# Patient Record
Sex: FEMALE | Race: WHITE | Employment: OTHER | URBAN - METROPOLITAN AREA
[De-identification: names, ages, dates, MRNs, and addresses within clinical notes are randomized per-mention and may not be internally consistent; named-entity substitution may affect disease eponyms.]

---

## 2017-01-10 ENCOUNTER — ANTI-COAG VISIT (OUTPATIENT)
Dept: PHARMACY | Facility: CLINIC | Age: 82
End: 2017-01-10

## 2017-01-10 DIAGNOSIS — Z79.01 LONG TERM (CURRENT) USE OF ANTICOAGULANTS: ICD-10-CM

## 2017-01-10 LAB — INR BLD: 3.2

## 2017-02-07 ENCOUNTER — ANTI-COAG VISIT (OUTPATIENT)
Dept: PHARMACY | Facility: CLINIC | Age: 82
End: 2017-02-07

## 2017-02-07 DIAGNOSIS — Z79.01 LONG TERM (CURRENT) USE OF ANTICOAGULANTS: ICD-10-CM

## 2017-02-07 LAB — INR BLD: 3.8

## 2017-02-16 ENCOUNTER — ANTI-COAG VISIT (OUTPATIENT)
Dept: PHARMACY | Facility: CLINIC | Age: 82
End: 2017-02-16

## 2017-02-16 DIAGNOSIS — Z79.01 LONG TERM (CURRENT) USE OF ANTICOAGULANTS: ICD-10-CM

## 2017-02-16 LAB — INR BLD: 2.3

## 2017-02-22 ENCOUNTER — OFFICE VISIT (OUTPATIENT)
Dept: CARDIOLOGY CLINIC | Age: 82
End: 2017-02-22

## 2017-02-22 VITALS
WEIGHT: 170.4 LBS | HEIGHT: 61 IN | HEART RATE: 73 BPM | SYSTOLIC BLOOD PRESSURE: 144 MMHG | BODY MASS INDEX: 32.17 KG/M2 | DIASTOLIC BLOOD PRESSURE: 80 MMHG

## 2017-02-22 DIAGNOSIS — I48.0 PAROXYSMAL ATRIAL FIBRILLATION (HCC): Primary | ICD-10-CM

## 2017-02-22 DIAGNOSIS — I10 ESSENTIAL HYPERTENSION: ICD-10-CM

## 2017-02-22 PROCEDURE — 1123F ACP DISCUSS/DSCN MKR DOCD: CPT | Performed by: NURSE PRACTITIONER

## 2017-02-22 PROCEDURE — G8427 DOCREV CUR MEDS BY ELIG CLIN: HCPCS | Performed by: NURSE PRACTITIONER

## 2017-02-22 PROCEDURE — G8482 FLU IMMUNIZE ORDER/ADMIN: HCPCS | Performed by: NURSE PRACTITIONER

## 2017-02-22 PROCEDURE — 1090F PRES/ABSN URINE INCON ASSESS: CPT | Performed by: NURSE PRACTITIONER

## 2017-02-22 PROCEDURE — 99213 OFFICE O/P EST LOW 20 MIN: CPT | Performed by: NURSE PRACTITIONER

## 2017-02-22 PROCEDURE — G8417 CALC BMI ABV UP PARAM F/U: HCPCS | Performed by: NURSE PRACTITIONER

## 2017-02-22 PROCEDURE — 1036F TOBACCO NON-USER: CPT | Performed by: NURSE PRACTITIONER

## 2017-02-22 PROCEDURE — 93000 ELECTROCARDIOGRAM COMPLETE: CPT | Performed by: NURSE PRACTITIONER

## 2017-02-22 PROCEDURE — 4040F PNEUMOC VAC/ADMIN/RCVD: CPT | Performed by: NURSE PRACTITIONER

## 2017-02-22 RX ORDER — DILTIAZEM HYDROCHLORIDE 120 MG/1
120 CAPSULE, COATED, EXTENDED RELEASE ORAL DAILY
Qty: 30 CAPSULE | Refills: 5 | Status: SHIPPED | OUTPATIENT
Start: 2017-02-22 | End: 2017-08-23 | Stop reason: SDUPTHER

## 2017-02-22 RX ORDER — WARFARIN SODIUM 4 MG/1
TABLET ORAL
Qty: 30 TABLET | Refills: 3 | Status: SHIPPED | OUTPATIENT
Start: 2017-02-22 | End: 2017-10-05 | Stop reason: SDUPTHER

## 2017-03-07 ENCOUNTER — ANTI-COAG VISIT (OUTPATIENT)
Dept: PHARMACY | Facility: CLINIC | Age: 82
End: 2017-03-07

## 2017-03-07 DIAGNOSIS — Z79.01 LONG TERM (CURRENT) USE OF ANTICOAGULANTS: ICD-10-CM

## 2017-03-07 LAB — INR BLD: 2.8

## 2017-03-29 DIAGNOSIS — E78.5 HYPERLIPIDEMIA, UNSPECIFIED HYPERLIPIDEMIA TYPE: ICD-10-CM

## 2017-03-29 DIAGNOSIS — E78.5 HYPERLIPIDEMIA, UNSPECIFIED HYPERLIPIDEMIA TYPE: Primary | ICD-10-CM

## 2017-03-29 LAB
A/G RATIO: 1.6 (ref 1.1–2.2)
ALBUMIN SERPL-MCNC: 4.1 G/DL (ref 3.4–5)
ALP BLD-CCNC: 104 U/L (ref 40–129)
ALT SERPL-CCNC: 12 U/L (ref 10–40)
ANION GAP SERPL CALCULATED.3IONS-SCNC: 18 MMOL/L (ref 3–16)
AST SERPL-CCNC: 18 U/L (ref 15–37)
BILIRUB SERPL-MCNC: 0.4 MG/DL (ref 0–1)
BUN BLDV-MCNC: 6 MG/DL (ref 7–20)
CALCIUM SERPL-MCNC: 9.1 MG/DL (ref 8.3–10.6)
CHLORIDE BLD-SCNC: 106 MMOL/L (ref 99–110)
CHOLESTEROL, TOTAL: 157 MG/DL (ref 0–199)
CO2: 21 MMOL/L (ref 21–32)
CREAT SERPL-MCNC: 0.9 MG/DL (ref 0.6–1.2)
GFR AFRICAN AMERICAN: >60
GFR NON-AFRICAN AMERICAN: 59
GLOBULIN: 2.6 G/DL
GLUCOSE BLD-MCNC: 144 MG/DL (ref 70–99)
HDLC SERPL-MCNC: 55 MG/DL (ref 40–60)
LDL CHOLESTEROL CALCULATED: 75 MG/DL
POTASSIUM SERPL-SCNC: 4.2 MMOL/L (ref 3.5–5.1)
SODIUM BLD-SCNC: 145 MMOL/L (ref 136–145)
TOTAL PROTEIN: 6.7 G/DL (ref 6.4–8.2)
TRIGL SERPL-MCNC: 136 MG/DL (ref 0–150)
VLDLC SERPL CALC-MCNC: 27 MG/DL

## 2017-04-04 ENCOUNTER — ANTI-COAG VISIT (OUTPATIENT)
Dept: PHARMACY | Facility: CLINIC | Age: 82
End: 2017-04-04

## 2017-04-04 DIAGNOSIS — Z79.01 LONG TERM (CURRENT) USE OF ANTICOAGULANTS: ICD-10-CM

## 2017-04-04 LAB — INR BLD: 2.2

## 2017-04-05 ENCOUNTER — OFFICE VISIT (OUTPATIENT)
Dept: FAMILY MEDICINE CLINIC | Age: 82
End: 2017-04-05

## 2017-04-05 VITALS
TEMPERATURE: 97.5 F | RESPIRATION RATE: 16 BRPM | DIASTOLIC BLOOD PRESSURE: 100 MMHG | WEIGHT: 164 LBS | SYSTOLIC BLOOD PRESSURE: 162 MMHG | BODY MASS INDEX: 30.99 KG/M2 | HEART RATE: 89 BPM

## 2017-04-05 DIAGNOSIS — I10 ESSENTIAL HYPERTENSION: Primary | ICD-10-CM

## 2017-04-05 DIAGNOSIS — D50.0 IRON DEFICIENCY ANEMIA DUE TO CHRONIC BLOOD LOSS: ICD-10-CM

## 2017-04-05 DIAGNOSIS — R73.9 ELEVATED BLOOD SUGAR: ICD-10-CM

## 2017-04-05 DIAGNOSIS — E78.2 MIXED HYPERLIPIDEMIA: ICD-10-CM

## 2017-04-05 LAB
BASOPHILS ABSOLUTE: 0 K/UL (ref 0–0.2)
BASOPHILS RELATIVE PERCENT: 1.1 %
EOSINOPHILS ABSOLUTE: 0.1 K/UL (ref 0–0.6)
EOSINOPHILS RELATIVE PERCENT: 1.5 %
HCT VFR BLD CALC: 44.3 % (ref 36–48)
HEMOGLOBIN: 13.9 G/DL (ref 12–16)
IRON SATURATION: 21 % (ref 15–50)
IRON: 65 UG/DL (ref 37–145)
LYMPHOCYTES ABSOLUTE: 1.5 K/UL (ref 1–5.1)
LYMPHOCYTES RELATIVE PERCENT: 36.1 %
MCH RBC QN AUTO: 27.2 PG (ref 26–34)
MCHC RBC AUTO-ENTMCNC: 31.4 G/DL (ref 31–36)
MCV RBC AUTO: 86.5 FL (ref 80–100)
MONOCYTES ABSOLUTE: 0.5 K/UL (ref 0–1.3)
MONOCYTES RELATIVE PERCENT: 11.4 %
NEUTROPHILS ABSOLUTE: 2.1 K/UL (ref 1.7–7.7)
NEUTROPHILS RELATIVE PERCENT: 49.9 %
PDW BLD-RTO: 30.4 % (ref 12.4–15.4)
PLATELET # BLD: 180 K/UL (ref 135–450)
PMV BLD AUTO: 9.3 FL (ref 5–10.5)
RBC # BLD: 5.12 M/UL (ref 4–5.2)
TOTAL IRON BINDING CAPACITY: 310 UG/DL (ref 260–445)
WBC # BLD: 4.1 K/UL (ref 4–11)

## 2017-04-05 PROCEDURE — 99214 OFFICE O/P EST MOD 30 MIN: CPT | Performed by: FAMILY MEDICINE

## 2017-04-05 PROCEDURE — 1036F TOBACCO NON-USER: CPT | Performed by: FAMILY MEDICINE

## 2017-04-05 PROCEDURE — 4040F PNEUMOC VAC/ADMIN/RCVD: CPT | Performed by: FAMILY MEDICINE

## 2017-04-05 PROCEDURE — 1123F ACP DISCUSS/DSCN MKR DOCD: CPT | Performed by: FAMILY MEDICINE

## 2017-04-05 PROCEDURE — G8417 CALC BMI ABV UP PARAM F/U: HCPCS | Performed by: FAMILY MEDICINE

## 2017-04-05 PROCEDURE — G8427 DOCREV CUR MEDS BY ELIG CLIN: HCPCS | Performed by: FAMILY MEDICINE

## 2017-04-05 PROCEDURE — 1090F PRES/ABSN URINE INCON ASSESS: CPT | Performed by: FAMILY MEDICINE

## 2017-04-05 RX ORDER — LISINOPRIL 10 MG/1
10 TABLET ORAL DAILY
Qty: 30 TABLET | Refills: 3 | Status: SHIPPED | OUTPATIENT
Start: 2017-04-05 | End: 2017-10-26 | Stop reason: ALTCHOICE

## 2017-04-05 RX ORDER — ATORVASTATIN CALCIUM 20 MG/1
20 TABLET, FILM COATED ORAL DAILY
Qty: 30 TABLET | Refills: 5 | Status: SHIPPED | OUTPATIENT
Start: 2017-04-05 | End: 2017-10-26 | Stop reason: SDUPTHER

## 2017-04-05 ASSESSMENT — ENCOUNTER SYMPTOMS
WHEEZING: 0
COUGH: 0
CONSTIPATION: 0
DIARRHEA: 0
SHORTNESS OF BREATH: 0
BLOOD IN STOOL: 0

## 2017-04-06 ENCOUNTER — TELEPHONE (OUTPATIENT)
Dept: FAMILY MEDICINE CLINIC | Age: 82
End: 2017-04-06

## 2017-04-06 LAB
ESTIMATED AVERAGE GLUCOSE: 125.5 MG/DL
HBA1C MFR BLD: 6 %

## 2017-04-08 ASSESSMENT — ENCOUNTER SYMPTOMS
PHOTOPHOBIA: 0
SORE THROAT: 0
TROUBLE SWALLOWING: 0
ABDOMINAL PAIN: 0

## 2017-05-02 ENCOUNTER — ANTI-COAG VISIT (OUTPATIENT)
Dept: PHARMACY | Facility: CLINIC | Age: 82
End: 2017-05-02

## 2017-05-02 DIAGNOSIS — Z79.01 LONG TERM (CURRENT) USE OF ANTICOAGULANTS: ICD-10-CM

## 2017-05-02 LAB — INR BLD: 1.9

## 2017-06-06 ENCOUNTER — ANTI-COAG VISIT (OUTPATIENT)
Dept: PHARMACY | Facility: CLINIC | Age: 82
End: 2017-06-06

## 2017-06-06 DIAGNOSIS — Z79.01 LONG TERM (CURRENT) USE OF ANTICOAGULANTS: ICD-10-CM

## 2017-06-06 LAB — INR BLD: 2.4

## 2017-07-10 ENCOUNTER — ANTI-COAG VISIT (OUTPATIENT)
Dept: PHARMACY | Facility: CLINIC | Age: 82
End: 2017-07-10

## 2017-07-10 DIAGNOSIS — Z79.01 LONG TERM (CURRENT) USE OF ANTICOAGULANTS: ICD-10-CM

## 2017-07-10 LAB — INR BLD: 2.5

## 2017-08-08 ENCOUNTER — ANTI-COAG VISIT (OUTPATIENT)
Dept: PHARMACY | Facility: CLINIC | Age: 82
End: 2017-08-08

## 2017-08-08 DIAGNOSIS — Z79.01 LONG TERM (CURRENT) USE OF ANTICOAGULANTS: ICD-10-CM

## 2017-08-08 LAB — INR BLD: 2.8

## 2017-08-11 RX ORDER — OMEPRAZOLE 40 MG/1
CAPSULE, DELAYED RELEASE ORAL
Qty: 30 CAPSULE | Refills: 4 | Status: SHIPPED | OUTPATIENT
Start: 2017-08-11 | End: 2017-10-26 | Stop reason: SDUPTHER

## 2017-08-23 ENCOUNTER — OFFICE VISIT (OUTPATIENT)
Dept: CARDIOLOGY CLINIC | Age: 82
End: 2017-08-23

## 2017-08-23 VITALS
BODY MASS INDEX: 32.1 KG/M2 | WEIGHT: 170 LBS | HEART RATE: 79 BPM | SYSTOLIC BLOOD PRESSURE: 140 MMHG | HEIGHT: 61 IN | DIASTOLIC BLOOD PRESSURE: 86 MMHG

## 2017-08-23 DIAGNOSIS — I48.0 PAROXYSMAL ATRIAL FIBRILLATION (HCC): Primary | ICD-10-CM

## 2017-08-23 PROCEDURE — 1123F ACP DISCUSS/DSCN MKR DOCD: CPT | Performed by: INTERNAL MEDICINE

## 2017-08-23 PROCEDURE — 93000 ELECTROCARDIOGRAM COMPLETE: CPT | Performed by: INTERNAL MEDICINE

## 2017-08-23 PROCEDURE — 4040F PNEUMOC VAC/ADMIN/RCVD: CPT | Performed by: INTERNAL MEDICINE

## 2017-08-23 PROCEDURE — G8427 DOCREV CUR MEDS BY ELIG CLIN: HCPCS | Performed by: INTERNAL MEDICINE

## 2017-08-23 PROCEDURE — 1036F TOBACCO NON-USER: CPT | Performed by: INTERNAL MEDICINE

## 2017-08-23 PROCEDURE — 99214 OFFICE O/P EST MOD 30 MIN: CPT | Performed by: INTERNAL MEDICINE

## 2017-08-23 PROCEDURE — 1090F PRES/ABSN URINE INCON ASSESS: CPT | Performed by: INTERNAL MEDICINE

## 2017-08-23 PROCEDURE — G8417 CALC BMI ABV UP PARAM F/U: HCPCS | Performed by: INTERNAL MEDICINE

## 2017-08-23 RX ORDER — DILTIAZEM HYDROCHLORIDE 240 MG/1
240 CAPSULE, COATED, EXTENDED RELEASE ORAL DAILY
Qty: 30 CAPSULE | Refills: 11 | Status: SHIPPED | OUTPATIENT
Start: 2017-08-23 | End: 2018-05-09 | Stop reason: SDUPTHER

## 2017-09-05 ENCOUNTER — ANTI-COAG VISIT (OUTPATIENT)
Dept: PHARMACY | Facility: CLINIC | Age: 82
End: 2017-09-05

## 2017-09-05 DIAGNOSIS — Z79.01 LONG TERM (CURRENT) USE OF ANTICOAGULANTS: ICD-10-CM

## 2017-09-05 LAB — INR BLD: 2.5

## 2017-10-03 ENCOUNTER — ANTI-COAG VISIT (OUTPATIENT)
Dept: PHARMACY | Facility: CLINIC | Age: 82
End: 2017-10-03

## 2017-10-03 DIAGNOSIS — Z79.01 LONG TERM (CURRENT) USE OF ANTICOAGULANTS: ICD-10-CM

## 2017-10-03 LAB — INR BLD: 2.3

## 2017-10-11 DIAGNOSIS — I10 ESSENTIAL HYPERTENSION: Primary | ICD-10-CM

## 2017-10-11 DIAGNOSIS — E78.2 MIXED HYPERLIPIDEMIA: ICD-10-CM

## 2017-10-20 DIAGNOSIS — E78.2 MIXED HYPERLIPIDEMIA: ICD-10-CM

## 2017-10-20 DIAGNOSIS — I10 ESSENTIAL HYPERTENSION: ICD-10-CM

## 2017-10-20 LAB
A/G RATIO: 1.4 (ref 1.1–2.2)
ALBUMIN SERPL-MCNC: 4.2 G/DL (ref 3.4–5)
ALP BLD-CCNC: 122 U/L (ref 40–129)
ALT SERPL-CCNC: 16 U/L (ref 10–40)
ANION GAP SERPL CALCULATED.3IONS-SCNC: 17 MMOL/L (ref 3–16)
AST SERPL-CCNC: 17 U/L (ref 15–37)
BILIRUB SERPL-MCNC: 0.5 MG/DL (ref 0–1)
BUN BLDV-MCNC: 14 MG/DL (ref 7–20)
CALCIUM SERPL-MCNC: 9.6 MG/DL (ref 8.3–10.6)
CHLORIDE BLD-SCNC: 105 MMOL/L (ref 99–110)
CHOLESTEROL, TOTAL: 190 MG/DL (ref 0–199)
CO2: 23 MMOL/L (ref 21–32)
CREAT SERPL-MCNC: 0.9 MG/DL (ref 0.6–1.2)
GFR AFRICAN AMERICAN: >60
GFR NON-AFRICAN AMERICAN: 59
GLOBULIN: 3.1 G/DL
GLUCOSE BLD-MCNC: 116 MG/DL (ref 70–99)
HDLC SERPL-MCNC: 56 MG/DL (ref 40–60)
LDL CHOLESTEROL CALCULATED: 113 MG/DL
POTASSIUM SERPL-SCNC: 4.3 MMOL/L (ref 3.5–5.1)
SODIUM BLD-SCNC: 145 MMOL/L (ref 136–145)
TOTAL PROTEIN: 7.3 G/DL (ref 6.4–8.2)
TRIGL SERPL-MCNC: 105 MG/DL (ref 0–150)
VLDLC SERPL CALC-MCNC: 21 MG/DL

## 2017-10-26 ENCOUNTER — OFFICE VISIT (OUTPATIENT)
Dept: FAMILY MEDICINE CLINIC | Age: 82
End: 2017-10-26

## 2017-10-26 VITALS
TEMPERATURE: 97.6 F | HEART RATE: 120 BPM | DIASTOLIC BLOOD PRESSURE: 89 MMHG | SYSTOLIC BLOOD PRESSURE: 125 MMHG | RESPIRATION RATE: 16 BRPM | WEIGHT: 156 LBS | BODY MASS INDEX: 29.48 KG/M2

## 2017-10-26 DIAGNOSIS — I48.0 PAROXYSMAL ATRIAL FIBRILLATION (HCC): ICD-10-CM

## 2017-10-26 DIAGNOSIS — E78.2 MIXED HYPERLIPIDEMIA: Primary | ICD-10-CM

## 2017-10-26 DIAGNOSIS — C18.2 MALIGNANT NEOPLASM OF ASCENDING COLON (HCC): ICD-10-CM

## 2017-10-26 DIAGNOSIS — R73.01 IMPAIRED FASTING BLOOD SUGAR: ICD-10-CM

## 2017-10-26 LAB
CEA: 3.1 NG/ML (ref 0–5)
HBA1C MFR BLD: 6 %

## 2017-10-26 PROCEDURE — 4040F PNEUMOC VAC/ADMIN/RCVD: CPT | Performed by: FAMILY MEDICINE

## 2017-10-26 PROCEDURE — 83036 HEMOGLOBIN GLYCOSYLATED A1C: CPT | Performed by: FAMILY MEDICINE

## 2017-10-26 PROCEDURE — 1123F ACP DISCUSS/DSCN MKR DOCD: CPT | Performed by: FAMILY MEDICINE

## 2017-10-26 PROCEDURE — 1036F TOBACCO NON-USER: CPT | Performed by: FAMILY MEDICINE

## 2017-10-26 PROCEDURE — G8427 DOCREV CUR MEDS BY ELIG CLIN: HCPCS | Performed by: FAMILY MEDICINE

## 2017-10-26 PROCEDURE — G8417 CALC BMI ABV UP PARAM F/U: HCPCS | Performed by: FAMILY MEDICINE

## 2017-10-26 PROCEDURE — 99214 OFFICE O/P EST MOD 30 MIN: CPT | Performed by: FAMILY MEDICINE

## 2017-10-26 PROCEDURE — 1090F PRES/ABSN URINE INCON ASSESS: CPT | Performed by: FAMILY MEDICINE

## 2017-10-26 PROCEDURE — G8484 FLU IMMUNIZE NO ADMIN: HCPCS | Performed by: FAMILY MEDICINE

## 2017-10-26 RX ORDER — ATORVASTATIN CALCIUM 20 MG/1
20 TABLET, FILM COATED ORAL DAILY
Qty: 30 TABLET | Refills: 5 | Status: SHIPPED | OUTPATIENT
Start: 2017-10-26 | End: 2018-05-03 | Stop reason: SDUPTHER

## 2017-10-26 RX ORDER — OMEPRAZOLE 40 MG/1
CAPSULE, DELAYED RELEASE ORAL
Qty: 30 CAPSULE | Refills: 5 | Status: SHIPPED | OUTPATIENT
Start: 2017-10-26 | End: 2018-05-09 | Stop reason: SDUPTHER

## 2017-10-26 ASSESSMENT — ENCOUNTER SYMPTOMS
BLOOD IN STOOL: 0
SHORTNESS OF BREATH: 0
WHEEZING: 0
DIARRHEA: 0
CONSTIPATION: 0
COUGH: 0

## 2017-10-26 NOTE — PROGRESS NOTES
Pneumovax 23 and a booster-  OK on pneumonia vaccine      Discussed meds and general therapy and condition. Stable and doing well. Follow 6 months    Prior to Visit Medications    Medication Sig Taking?  Authorizing Provider   omeprazole (PRILOSEC) 40 MG delayed release capsule TAKE ONE CAPSULE BY MOUTH DAILY Yes Cheryal Burkitt, DO   atorvastatin (LIPITOR) 20 MG tablet Take 1 tablet by mouth daily Yes Cheryal Burkitt, DO   warfarin (COUMADIN) 4 MG tablet TAKE ONE TABLET BY MOUTH DAILY Yes Alyssa Oliveira CNP   diltiazem (CARDIZEM CD) 240 MG extended release capsule Take 1 capsule by mouth daily Yes Alex Heck MD

## 2017-10-30 ENCOUNTER — TELEPHONE (OUTPATIENT)
Dept: FAMILY MEDICINE CLINIC | Age: 82
End: 2017-10-30

## 2017-10-30 NOTE — TELEPHONE ENCOUNTER
Patient called to get results from 10.26.17.    [phone] can leave voicemail only if results are normal.     Future Appointments  Date Time Provider Tracey Lambert   11/7/2017 11:30 AM [address] [facility]

## 2017-11-01 ENCOUNTER — HOSPITAL ENCOUNTER (OUTPATIENT)
Dept: OTHER | Age: 82
Discharge: HOME | End: 2017-11-30
Attending: INTERNAL MEDICINE | Admitting: INTERNAL MEDICINE

## 2017-11-07 ENCOUNTER — ANTI-COAG VISIT (OUTPATIENT)
Dept: PHARMACY | Facility: CLINIC | Age: 82
End: 2017-11-07

## 2017-11-07 DIAGNOSIS — Z79.01 LONG TERM CURRENT USE OF ANTICOAGULANT THERAPY: ICD-10-CM

## 2017-11-07 LAB — INR BLD: 1.5

## 2017-11-21 ENCOUNTER — ANTI-COAG VISIT (OUTPATIENT)
Dept: PHARMACY | Facility: CLINIC | Age: 82
End: 2017-11-21

## 2017-11-21 DIAGNOSIS — Z79.01 LONG TERM CURRENT USE OF ANTICOAGULANT THERAPY: ICD-10-CM

## 2017-11-21 LAB — INR BLD: 1.5

## 2017-12-01 ENCOUNTER — ANTI-COAG VISIT (OUTPATIENT)
Dept: PHARMACY | Facility: CLINIC | Age: 82
End: 2017-12-01

## 2017-12-01 ENCOUNTER — HOSPITAL ENCOUNTER (OUTPATIENT)
Dept: OTHER | Age: 82
Discharge: HOME | End: 2017-12-31
Attending: INTERNAL MEDICINE | Admitting: INTERNAL MEDICINE

## 2017-12-01 DIAGNOSIS — Z79.01 LONG TERM CURRENT USE OF ANTICOAGULANT THERAPY: ICD-10-CM

## 2017-12-01 LAB — INR BLD: 1.5

## 2017-12-07 ENCOUNTER — HOSPITAL ENCOUNTER (OUTPATIENT)
Dept: MAMMOGRAPHY | Age: 82
Discharge: HOME | End: 2017-12-07
Admitting: FAMILY MEDICINE

## 2017-12-07 DIAGNOSIS — Z12.39 BREAST CANCER SCREENING: ICD-10-CM

## 2017-12-08 ENCOUNTER — ANTI-COAG VISIT (OUTPATIENT)
Dept: PHARMACY | Facility: CLINIC | Age: 82
End: 2017-12-08

## 2017-12-08 DIAGNOSIS — Z79.01 LONG TERM CURRENT USE OF ANTICOAGULANT THERAPY: ICD-10-CM

## 2017-12-08 LAB — INR BLD: 1.7

## 2017-12-15 ENCOUNTER — ANTI-COAG VISIT (OUTPATIENT)
Dept: PHARMACY | Facility: CLINIC | Age: 82
End: 2017-12-15

## 2017-12-15 DIAGNOSIS — Z79.01 LONG TERM CURRENT USE OF ANTICOAGULANT THERAPY: ICD-10-CM

## 2017-12-15 LAB — INR BLD: 2.3

## 2017-12-15 NOTE — PROGRESS NOTES
Ms. Kobi Meade is here for management of anticoagulation for Atrial Fibrillation. PMH also significant for HTN and HLD. She presents today w/out complaint. Pt verifies dosing regimen as listed above. Pt denies s/s bleeding/bruising/swelling/SOB. No BRBPR. No melena. Patient reports not taking any OTC/Herbals/RX. Reviewed dietary concerns. No EToH and tobacco use. Patient states she has been eating healthier and has lost weight, is eating greens but states it is not more than usual.    INR 2.3 is within therapeutic range of 2-3. Recommend to continue 6mg Q Fri and 4 mg daily all other days. Patient has 4 mg tablets. Will continue to monitor and check INR in 2 weeks as pt's dose has been steadily increased over the past few weeks. Dosing reminder card given with phone number, appointment date and time.    Return to clinic: 12/29 11:45am

## 2017-12-29 ENCOUNTER — ANTI-COAG VISIT (OUTPATIENT)
Dept: PHARMACY | Facility: CLINIC | Age: 82
End: 2017-12-29

## 2017-12-29 LAB — INR BLD: 2.2

## 2017-12-29 NOTE — PROGRESS NOTES
Ms. Claudia Menon is here for management of anticoagulation for Atrial Fibrillation. PMH also significant for HTN and HLD. She presents today w/out complaint. Pt verifies dosing regimen as listed above. Pt denies s/s bleeding/bruising/swelling/SOB. No BRBPR. No melena. Patient reports not taking any OTC/Herbals/RX. Reviewed dietary concerns. No EToH and tobacco use. Patient states she has been eating healthier and has lost weight, is eating greens but states it is not more than usual.    INR 2.2 is within therapeutic range of 2-3. Recommend to continue 6mg Q Fri and 4 mg daily all other days. Patient has 4 mg tablets. Will continue to monitor and check INR in 4 weeks  Dosing reminder card given with phone number, appointment date and time. Return to clinic: 1/23 11:30am    Raquel Kuhn PharmD. Candidate 2018    I have seen the patient and reviewed the progress note written by the PharmD Candidate. I agree with this assessment and plan.    Pushpa MedinaD 12/29/2017 11:45 AM

## 2018-01-01 ENCOUNTER — HOSPITAL ENCOUNTER (OUTPATIENT)
Dept: OTHER | Age: 83
Discharge: HOME | End: 2018-01-31
Attending: INTERNAL MEDICINE | Admitting: INTERNAL MEDICINE

## 2018-01-23 ENCOUNTER — ANTI-COAG VISIT (OUTPATIENT)
Dept: PHARMACY | Facility: CLINIC | Age: 83
End: 2018-01-23

## 2018-01-23 DIAGNOSIS — Z79.01 LONG TERM CURRENT USE OF ANTICOAGULANT THERAPY: ICD-10-CM

## 2018-01-23 LAB — INR BLD: 1.9

## 2018-02-01 ENCOUNTER — HOSPITAL ENCOUNTER (OUTPATIENT)
Dept: OTHER | Age: 83
Discharge: HOME | End: 2018-02-28
Attending: INTERNAL MEDICINE | Admitting: INTERNAL MEDICINE

## 2018-02-20 ENCOUNTER — ANTI-COAG VISIT (OUTPATIENT)
Dept: PHARMACY | Facility: CLINIC | Age: 83
End: 2018-02-20

## 2018-02-20 DIAGNOSIS — Z79.01 LONG TERM CURRENT USE OF ANTICOAGULANT THERAPY: ICD-10-CM

## 2018-02-20 LAB — INR BLD: 1.8

## 2018-02-28 ENCOUNTER — OFFICE VISIT (OUTPATIENT)
Dept: CARDIOLOGY CLINIC | Age: 83
End: 2018-02-28

## 2018-02-28 VITALS
DIASTOLIC BLOOD PRESSURE: 86 MMHG | HEIGHT: 61 IN | SYSTOLIC BLOOD PRESSURE: 138 MMHG | BODY MASS INDEX: 28.32 KG/M2 | HEART RATE: 102 BPM | WEIGHT: 150 LBS

## 2018-02-28 DIAGNOSIS — I10 ESSENTIAL HYPERTENSION: ICD-10-CM

## 2018-02-28 DIAGNOSIS — I48.21 PERMANENT ATRIAL FIBRILLATION (HCC): Primary | ICD-10-CM

## 2018-02-28 PROCEDURE — 1036F TOBACCO NON-USER: CPT | Performed by: NURSE PRACTITIONER

## 2018-02-28 PROCEDURE — 99213 OFFICE O/P EST LOW 20 MIN: CPT | Performed by: NURSE PRACTITIONER

## 2018-02-28 PROCEDURE — 1123F ACP DISCUSS/DSCN MKR DOCD: CPT | Performed by: NURSE PRACTITIONER

## 2018-02-28 PROCEDURE — G8427 DOCREV CUR MEDS BY ELIG CLIN: HCPCS | Performed by: NURSE PRACTITIONER

## 2018-02-28 PROCEDURE — 93000 ELECTROCARDIOGRAM COMPLETE: CPT | Performed by: NURSE PRACTITIONER

## 2018-02-28 PROCEDURE — 4040F PNEUMOC VAC/ADMIN/RCVD: CPT | Performed by: NURSE PRACTITIONER

## 2018-02-28 PROCEDURE — 1090F PRES/ABSN URINE INCON ASSESS: CPT | Performed by: NURSE PRACTITIONER

## 2018-02-28 PROCEDURE — G8417 CALC BMI ABV UP PARAM F/U: HCPCS | Performed by: NURSE PRACTITIONER

## 2018-02-28 PROCEDURE — G8484 FLU IMMUNIZE NO ADMIN: HCPCS | Performed by: NURSE PRACTITIONER

## 2018-02-28 NOTE — LETTER
[address] Cardiology - [address] [facility] [address] [place]. [address]  Phone: [phone]  Fax: [phone]    Carmen Velazquez, [address] [facility]        March 4, 2018     Henry Epstein DO  [address] [facility] [address]  [address] [facility]    Patient: Adela Andres  MR Number: [id number]  YOB: 1931  Date of Visit: 2/28/2018    Dear Dr. Henry Epstein:    I recently saw our mutual patient, listed above. Below are the relevant portions of my assessment and plan of care. [address]   Electrophysiology  Note              Date:  February 28, 2018  Patient name: Adela Andres  YOB: 1931    Primary Care physician: Henry Epstein DO    HISTORY OF PRESENT ILLNESS: The patient is an 80 y.o.  female with a past medical history of atrial fibrillation, HTN, HLD, and colon cancer. She was admitted to the hospital in 6/2016 for an elective right colectomy. Her rhythm converted to atrial fibrillation with RVR during her hospitalization. Her rate was controlled and she was discharged home on Cardizem, metoprolol, and Coumadin. At her visits in 7/2016 and 8/2016 her rhythm was sinus martínez and her metoprolol and diltiazem were decreased. She was back in afib in 2/2017 (asymptomatic) and has remained in AF at subsequent visits. Metoprolol was stopped in 8/2017 due to fatigue. Today, she is being seen for atrial fibrillation. Her EKG shows atrial fibrillation with a HR of 102. She is feeling well and denies chest pain, palpitations, shortness of breath, and dizziness. States her fatigue improved off metoprolol. Reports home HR as averaging 80. Past Medical History:   has a past medical history of Anemia; Atrial fibrillation ([address] Cancer ([address] Hyperlipidemia; and Hypertension. Past Surgical History:   has a past surgical history that includes Appendectomy; Hysterectomy; Colonoscopy (5/18/16);  Upper gastrointestinal endoscopy (5/18/16); fracture surgery; and Colon surgery (Right, 6/7/16). Home Medications:    Prior to Admission medications    Medication Sig Start Date End Date Taking? Authorizing Provider   omeprazole (PRILOSEC) 40 MG delayed release capsule TAKE ONE CAPSULE BY MOUTH DAILY 10/26/17  Yes Maia Hidalgo DO   atorvastatin (LIPITOR) 20 MG tablet Take 1 tablet by mouth daily 10/26/17  Yes Maia Hidalgo DO   warfarin (COUMADIN) 4 MG tablet TAKE ONE TABLET BY MOUTH DAILY 10/5/17  Yes Nicki Hull CNP   diltiazem (CARDIZEM CD) 240 MG extended release capsule Take 1 capsule by mouth daily 8/23/17  Yes Roya Darling MD       Allergies:  Amoxicillin and Sulfa antibiotics    Social History:   reports that she has never smoked. She has never used smokeless tobacco. She reports that she does not drink alcohol or use drugs. Family History: family history includes Cancer in her mother. Review of Systems   Constitutional: Negative  HENT: Negative. Eyes: Negative. Respiratory: Negative. Cardiovascular: see HPI  Gastrointestinal: Negative. Genitourinary: Negative. Musculoskeletal: Negative. Skin: Negative. Neurological: Negative. Hematological: Negative. Psychiatric/Behavioral: Negative. PHYSICAL EXAM:    Physical Examination:    /86   Pulse 102   Ht 5' 1\" (1.549 m)   Wt 150 lb (68 kg)   BMI 28.34 kg/m²       Constitutional and general appearance: alert, cooperative, no distress and appears stated age  [de-identified]: PERRL, no cervical lymphadenopathy. No masses palpable.  Normal oral mucosa  Respiratory:  · Normal excursion and expansion without use of accessory muscles  · Resp auscultation: Normal breath sounds without dullness or wheezing  Cardiovascular:  · The apical impulse is not displaced  · Heart tones are crisp and normal. Irregular S1 and S2.  · Jugular venous pulsation Normal  · The carotid upstroke is normal in amplitude and contour without delay or bruit

## 2018-02-28 NOTE — PROGRESS NOTES
[facility]   Electrophysiology  Note              Date:  February 28, 2018  Patient name: Zachary La  YOB: 1931    Primary Care physician: Maribell Farmer DO    HISTORY OF PRESENT ILLNESS: The patient is an 80 y.o.  female with a past medical history of atrial fibrillation, HTN, HLD, and colon cancer. She was admitted to the hospital in 6/2016 for an elective right colectomy. Her rhythm converted to atrial fibrillation with RVR during her hospitalization. Her rate was controlled and she was discharged home on Cardizem, metoprolol, and Coumadin. At her visits in 7/2016 and 8/2016 her rhythm was sinus martínez and her metoprolol and diltiazem were decreased. She was back in afib in 2/2017 (asymptomatic) and has remained in AF at subsequent visits. Metoprolol was stopped in 8/2017 due to fatigue. Today, she is being seen for atrial fibrillation. Her EKG shows atrial fibrillation with a HR of 102. She is feeling well and denies chest pain, palpitations, shortness of breath, and dizziness. States her fatigue improved off metoprolol. Reports home HR as averaging 80. Past Medical History:   has a past medical history of Anemia; Atrial fibrillation ([address] [address] Hyperlipidemia; and Hypertension. Past Surgical History:   has a past surgical history that includes Appendectomy; Hysterectomy; Colonoscopy (5/18/16); Upper gastrointestinal endoscopy (5/18/16); fracture surgery; and Colon surgery (Right, 6/7/16). Home Medications:    Prior to Admission medications    Medication Sig Start Date End Date Taking?  Authorizing Provider   omeprazole (PRILOSEC) 40 MG delayed release capsule TAKE ONE CAPSULE BY MOUTH DAILY 10/26/17  Yes Maribell Farmer DO   atorvastatin (LIPITOR) 20 MG tablet Take 1 tablet by mouth daily 10/26/17  Yes Maribell Farmer DO   warfarin (COUMADIN) 4 MG tablet TAKE ONE TABLET BY MOUTH DAILY 10/5/17  Yes Delmis Otoole CNP   diltiazem (CARDIZEM CD) 240 LABGLOM, GLUCOSE in the last 72 hours. PT/INR:   No results for input(s): PROTIME, INR in the last 72 hours. APTT:No results for input(s): APTT in the last 72 hours. FASTING LIPID PANEL:  Lab Results   Component Value Date    HDL 56 10/20/2017    LDLCALC 113 10/20/2017    TRIG 105 10/20/2017     LIVER PROFILE:No results for input(s): AST, ALT, ALB in the last 72 hours. Assessment:   1. Permanent atrial fibrillation:  today    -on Coumadin for [id number] score 4 (age, gender, HTN)  2. HTN: controlled  3. Colon cancer: s/p right colectomy on 6/7/2016  4. Anemia: managed by Dr. Sarah Cochran, intolerant of iron supplements    Plan:   1. No changes today  2. Continue to monitor heart rate at home and call if consistently over 100  3.  Follow up in 6 months    Marquis Lorenzana, [address]  [phone]

## 2018-03-01 ENCOUNTER — HOSPITAL ENCOUNTER (OUTPATIENT)
Dept: OTHER | Age: 83
Discharge: HOME | End: 2018-03-31
Attending: INTERNAL MEDICINE | Admitting: INTERNAL MEDICINE

## 2018-03-05 NOTE — COMMUNICATION BODY
MG extended release capsule Take 1 capsule by mouth daily 8/23/17  Yes Ilene Villagomez MD       Allergies:  Amoxicillin and Sulfa antibiotics    Social History:   reports that she has never smoked. She has never used smokeless tobacco. She reports that she does not drink alcohol or use drugs. Family History: family history includes Cancer in her mother. Review of Systems   Constitutional: Negative  HENT: Negative. Eyes: Negative. Respiratory: Negative. Cardiovascular: see HPI  Gastrointestinal: Negative. Genitourinary: Negative. Musculoskeletal: Negative. Skin: Negative. Neurological: Negative. Hematological: Negative. Psychiatric/Behavioral: Negative. PHYSICAL EXAM:    Physical Examination:    /86   Pulse 102   Ht 5' 1\" (1.549 m)   Wt 150 lb (68 kg)   BMI 28.34 kg/m²       Constitutional and general appearance: alert, cooperative, no distress and appears stated age  [de-identified]: PERRL, no cervical lymphadenopathy. No masses palpable. Normal oral mucosa  Respiratory:  · Normal excursion and expansion without use of accessory muscles  · Resp auscultation: Normal breath sounds without dullness or wheezing  Cardiovascular:  · The apical impulse is not displaced  · Heart tones are crisp and normal. Irregular S1 and S2.  · Jugular venous pulsation Normal  · The carotid upstroke is normal in amplitude and contour without delay or bruit  · Peripheral pulses are symmetrical and full   Abdomen:  · No masses or tenderness  · Bowel sounds present  Extremities:  ·  No cyanosis or clubbing  ·  No lower extremity edema  ·  Skin: warm and dry  Neurological:  · Alert and oriented  · Moves all extremities well  · No abnormalities of mood, affect, memory, mentation, or behavior are noted    DATA:    ECG  2/28/2018:  Atrial fibrillation     CARDIOLOGY LABS:   CBC: No results for input(s): WBC, HGB, HCT, PLT in the last 72 hours.   BMP: No results for input(s): NA, K, CO2, BUN,

## 2018-03-20 ENCOUNTER — ANTI-COAG VISIT (OUTPATIENT)
Dept: PHARMACY | Facility: CLINIC | Age: 83
End: 2018-03-20

## 2018-03-20 DIAGNOSIS — Z79.01 LONG TERM CURRENT USE OF ANTICOAGULANT THERAPY: ICD-10-CM

## 2018-03-20 LAB — INR BLD: 1.7

## 2018-04-01 ENCOUNTER — HOSPITAL ENCOUNTER (OUTPATIENT)
Dept: OTHER | Age: 83
Discharge: HOME | End: 2018-04-30
Attending: INTERNAL MEDICINE | Admitting: INTERNAL MEDICINE

## 2018-04-03 ENCOUNTER — ANTI-COAG VISIT (OUTPATIENT)
Dept: PHARMACY | Facility: CLINIC | Age: 83
End: 2018-04-03

## 2018-04-03 DIAGNOSIS — Z79.01 LONG TERM CURRENT USE OF ANTICOAGULANT THERAPY: ICD-10-CM

## 2018-04-03 LAB — INR BLD: 1.8

## 2018-04-17 ENCOUNTER — ANTI-COAG VISIT (OUTPATIENT)
Dept: PHARMACY | Facility: CLINIC | Age: 83
End: 2018-04-17

## 2018-04-17 DIAGNOSIS — Z79.01 LONG TERM CURRENT USE OF ANTICOAGULANT THERAPY: ICD-10-CM

## 2018-04-17 LAB — INR BLD: 2.7

## 2018-04-18 ENCOUNTER — TELEPHONE (OUTPATIENT)
Dept: FAMILY MEDICINE CLINIC | Age: 83
End: 2018-04-18

## 2018-04-30 DIAGNOSIS — E78.2 MIXED HYPERLIPIDEMIA: Primary | ICD-10-CM

## 2018-05-01 ENCOUNTER — HOSPITAL ENCOUNTER (OUTPATIENT)
Dept: OTHER | Age: 83
Discharge: HOME | End: 2018-05-31
Attending: INTERNAL MEDICINE | Admitting: INTERNAL MEDICINE

## 2018-05-03 DIAGNOSIS — I48.21 PERMANENT ATRIAL FIBRILLATION (HCC): ICD-10-CM

## 2018-05-03 DIAGNOSIS — Z79.899 MEDICATION MANAGEMENT: Primary | ICD-10-CM

## 2018-05-03 RX ORDER — ATORVASTATIN CALCIUM 20 MG/1
TABLET, FILM COATED ORAL
Qty: 30 TABLET | Refills: 4 | Status: SHIPPED | OUTPATIENT
Start: 2018-05-03 | End: 2018-05-09 | Stop reason: SDUPTHER

## 2018-05-07 RX ORDER — WARFARIN SODIUM 4 MG/1
TABLET ORAL
Qty: 30 TABLET | Refills: 0 | Status: SHIPPED | OUTPATIENT
Start: 2018-05-07 | End: 2018-06-06 | Stop reason: SDUPTHER

## 2018-05-08 ENCOUNTER — ANTI-COAG VISIT (OUTPATIENT)
Dept: PHARMACY | Facility: CLINIC | Age: 83
End: 2018-05-08

## 2018-05-08 DIAGNOSIS — Z79.01 LONG TERM CURRENT USE OF ANTICOAGULANT THERAPY: ICD-10-CM

## 2018-05-08 LAB — INR BLD: 3.1

## 2018-05-09 ENCOUNTER — OFFICE VISIT (OUTPATIENT)
Dept: FAMILY MEDICINE CLINIC | Age: 83
End: 2018-05-09

## 2018-05-09 VITALS
HEIGHT: 61 IN | BODY MASS INDEX: 27 KG/M2 | HEART RATE: 71 BPM | WEIGHT: 143 LBS | OXYGEN SATURATION: 94 % | DIASTOLIC BLOOD PRESSURE: 86 MMHG | SYSTOLIC BLOOD PRESSURE: 126 MMHG

## 2018-05-09 DIAGNOSIS — I48.21 PERMANENT ATRIAL FIBRILLATION (HCC): ICD-10-CM

## 2018-05-09 DIAGNOSIS — I48.0 PAROXYSMAL ATRIAL FIBRILLATION (HCC): ICD-10-CM

## 2018-05-09 DIAGNOSIS — I10 ESSENTIAL HYPERTENSION: ICD-10-CM

## 2018-05-09 DIAGNOSIS — R73.01 IMPAIRED FASTING BLOOD SUGAR: Primary | ICD-10-CM

## 2018-05-09 DIAGNOSIS — Z79.899 MEDICATION MANAGEMENT: ICD-10-CM

## 2018-05-09 DIAGNOSIS — E78.2 MIXED HYPERLIPIDEMIA: ICD-10-CM

## 2018-05-09 LAB
HBA1C MFR BLD: 6.4 %
HCT VFR BLD CALC: 46.5 % (ref 36–48)
HEMOGLOBIN: 15.7 G/DL (ref 12–16)
MCH RBC QN AUTO: 32.3 PG (ref 26–34)
MCHC RBC AUTO-ENTMCNC: 33.7 G/DL (ref 31–36)
MCV RBC AUTO: 95.9 FL (ref 80–100)
PDW BLD-RTO: 15.2 % (ref 12.4–15.4)
PLATELET # BLD: 234 K/UL (ref 135–450)
PMV BLD AUTO: 9.6 FL (ref 5–10.5)
RBC # BLD: 4.85 M/UL (ref 4–5.2)
WBC # BLD: 4.7 K/UL (ref 4–11)

## 2018-05-09 PROCEDURE — G8417 CALC BMI ABV UP PARAM F/U: HCPCS | Performed by: FAMILY MEDICINE

## 2018-05-09 PROCEDURE — 1036F TOBACCO NON-USER: CPT | Performed by: FAMILY MEDICINE

## 2018-05-09 PROCEDURE — 1123F ACP DISCUSS/DSCN MKR DOCD: CPT | Performed by: FAMILY MEDICINE

## 2018-05-09 PROCEDURE — G8427 DOCREV CUR MEDS BY ELIG CLIN: HCPCS | Performed by: FAMILY MEDICINE

## 2018-05-09 PROCEDURE — 99214 OFFICE O/P EST MOD 30 MIN: CPT | Performed by: FAMILY MEDICINE

## 2018-05-09 PROCEDURE — 83036 HEMOGLOBIN GLYCOSYLATED A1C: CPT | Performed by: FAMILY MEDICINE

## 2018-05-09 PROCEDURE — 4040F PNEUMOC VAC/ADMIN/RCVD: CPT | Performed by: FAMILY MEDICINE

## 2018-05-09 PROCEDURE — 1090F PRES/ABSN URINE INCON ASSESS: CPT | Performed by: FAMILY MEDICINE

## 2018-05-09 RX ORDER — OMEPRAZOLE 40 MG/1
CAPSULE, DELAYED RELEASE ORAL
Qty: 30 CAPSULE | Refills: 5 | Status: SHIPPED | OUTPATIENT
Start: 2018-05-09 | End: 2018-10-22 | Stop reason: SDUPTHER

## 2018-05-09 RX ORDER — DILTIAZEM HYDROCHLORIDE 240 MG/1
240 CAPSULE, COATED, EXTENDED RELEASE ORAL DAILY
Qty: 30 CAPSULE | Refills: 11 | Status: SHIPPED | OUTPATIENT
Start: 2018-05-09 | End: 2018-10-22 | Stop reason: SDUPTHER

## 2018-05-09 RX ORDER — ATORVASTATIN CALCIUM 20 MG/1
TABLET, FILM COATED ORAL
Qty: 30 TABLET | Refills: 11 | Status: SHIPPED | OUTPATIENT
Start: 2018-05-09 | End: 2018-10-22 | Stop reason: SDUPTHER

## 2018-05-09 RX ORDER — CLOTRIMAZOLE AND BETAMETHASONE DIPROPIONATE 10; .64 MG/G; MG/G
CREAM TOPICAL
Qty: 1 TUBE | Refills: 1 | Status: SHIPPED | OUTPATIENT
Start: 2018-05-09 | End: 2018-07-06 | Stop reason: SDUPTHER

## 2018-05-09 ASSESSMENT — ENCOUNTER SYMPTOMS
CONSTIPATION: 0
CHOKING: 0
COUGH: 0
DIARRHEA: 0
SHORTNESS OF BREATH: 0
BLOOD IN STOOL: 0

## 2018-05-09 ASSESSMENT — PATIENT HEALTH QUESTIONNAIRE - PHQ9
1. LITTLE INTEREST OR PLEASURE IN DOING THINGS: 0
SUM OF ALL RESPONSES TO PHQ QUESTIONS 1-9: 0
SUM OF ALL RESPONSES TO PHQ9 QUESTIONS 1 & 2: 0
2. FEELING DOWN, DEPRESSED OR HOPELESS: 0

## 2018-05-10 ENCOUNTER — TELEPHONE (OUTPATIENT)
Dept: CARDIOLOGY CLINIC | Age: 83
End: 2018-05-10

## 2018-05-13 ASSESSMENT — ENCOUNTER SYMPTOMS: ABDOMINAL PAIN: 0

## 2018-06-01 ENCOUNTER — HOSPITAL ENCOUNTER (OUTPATIENT)
Dept: OTHER | Age: 83
Discharge: HOME | End: 2018-06-30
Attending: INTERNAL MEDICINE | Admitting: INTERNAL MEDICINE

## 2018-06-04 DIAGNOSIS — I48.21 PERMANENT ATRIAL FIBRILLATION (HCC): ICD-10-CM

## 2018-06-06 DIAGNOSIS — I48.21 PERMANENT ATRIAL FIBRILLATION (HCC): ICD-10-CM

## 2018-06-06 RX ORDER — WARFARIN SODIUM 4 MG/1
TABLET ORAL
Qty: 60 TABLET | Refills: 5 | Status: SHIPPED | OUTPATIENT
Start: 2018-06-06 | End: 2018-10-22 | Stop reason: SDUPTHER

## 2018-06-07 RX ORDER — WARFARIN SODIUM 4 MG/1
TABLET ORAL
Qty: 90 TABLET | Refills: 1 | Status: SHIPPED | OUTPATIENT
Start: 2018-06-07 | End: 2018-10-22 | Stop reason: SDUPTHER

## 2018-06-12 ENCOUNTER — ANTI-COAG VISIT (OUTPATIENT)
Dept: PHARMACY | Facility: CLINIC | Age: 83
End: 2018-06-12

## 2018-06-12 DIAGNOSIS — Z79.01 LONG TERM CURRENT USE OF ANTICOAGULANT THERAPY: ICD-10-CM

## 2018-06-12 LAB — INR BLD: 3.8

## 2018-06-26 ENCOUNTER — ANTI-COAG VISIT (OUTPATIENT)
Dept: PHARMACY | Facility: CLINIC | Age: 83
End: 2018-06-26

## 2018-06-26 DIAGNOSIS — Z79.01 LONG TERM CURRENT USE OF ANTICOAGULANT THERAPY: ICD-10-CM

## 2018-06-26 LAB — INR BLD: 3.1

## 2018-07-01 ENCOUNTER — HOSPITAL ENCOUNTER (OUTPATIENT)
Dept: OTHER | Age: 83
Discharge: HOME | End: 2018-07-01
Attending: INTERNAL MEDICINE | Admitting: INTERNAL MEDICINE

## 2018-07-10 ENCOUNTER — ANTI-COAG VISIT (OUTPATIENT)
Dept: PHARMACY | Facility: CLINIC | Age: 83
End: 2018-07-10

## 2018-07-10 DIAGNOSIS — Z79.01 LONG TERM CURRENT USE OF ANTICOAGULANT THERAPY: ICD-10-CM

## 2018-07-10 LAB — INR BLD: 2.4

## 2018-08-02 ENCOUNTER — ANTI-COAG VISIT (OUTPATIENT)
Dept: PHARMACY | Age: 83
End: 2018-08-02
Payer: MEDICARE

## 2018-08-02 DIAGNOSIS — Z79.01 LONG TERM CURRENT USE OF ANTICOAGULANT THERAPY: ICD-10-CM

## 2018-08-02 LAB — INR BLD: 2.9

## 2018-08-02 PROCEDURE — 85610 PROTHROMBIN TIME: CPT

## 2018-08-02 PROCEDURE — 99211 OFF/OP EST MAY X REQ PHY/QHP: CPT

## 2018-08-02 NOTE — PROGRESS NOTES
Ms. Davian Posey is here for management of anticoagulation for Atrial Fibrillation. PMH also significant for HTN and HLD. She presents today w/out complaint. Pt verifies dosing regimen as listed above. Pt denies s/s bleeding/bruising/swelling/SOB. No BRBPR. No melena. Patient reports not taking any OTC/Herbals/RX. Reviewed dietary concerns. No ETOH or tobacco use. Pt hasn't been eating as many salads as she used to. No changes with medications. INR 2.9 is within therapeutic range of 2-3. Recommend to continue 6mg Q Sun and 4mg daily all other days. Patient has 4 mg tablets. Will continue to monitor and check INR in 2 weeks  Dosing reminder card given with phone number, appointment date and time.    Return to clinic: 9/4 @ 11:30 am

## 2018-08-22 ENCOUNTER — OFFICE VISIT (OUTPATIENT)
Dept: CARDIOLOGY CLINIC | Age: 83
End: 2018-08-22

## 2018-08-22 VITALS
HEIGHT: 64 IN | BODY MASS INDEX: 27.66 KG/M2 | OXYGEN SATURATION: 96 % | WEIGHT: 162 LBS | HEART RATE: 84 BPM | SYSTOLIC BLOOD PRESSURE: 120 MMHG | DIASTOLIC BLOOD PRESSURE: 84 MMHG

## 2018-08-22 DIAGNOSIS — I48.21 PERMANENT ATRIAL FIBRILLATION (HCC): Primary | ICD-10-CM

## 2018-08-22 DIAGNOSIS — I10 ESSENTIAL HYPERTENSION: ICD-10-CM

## 2018-08-22 PROCEDURE — 4040F PNEUMOC VAC/ADMIN/RCVD: CPT | Performed by: NURSE PRACTITIONER

## 2018-08-22 PROCEDURE — 1090F PRES/ABSN URINE INCON ASSESS: CPT | Performed by: NURSE PRACTITIONER

## 2018-08-22 PROCEDURE — G8417 CALC BMI ABV UP PARAM F/U: HCPCS | Performed by: NURSE PRACTITIONER

## 2018-08-22 PROCEDURE — G8427 DOCREV CUR MEDS BY ELIG CLIN: HCPCS | Performed by: NURSE PRACTITIONER

## 2018-08-22 PROCEDURE — 99213 OFFICE O/P EST LOW 20 MIN: CPT | Performed by: NURSE PRACTITIONER

## 2018-08-22 PROCEDURE — 93000 ELECTROCARDIOGRAM COMPLETE: CPT | Performed by: NURSE PRACTITIONER

## 2018-08-22 PROCEDURE — 1123F ACP DISCUSS/DSCN MKR DOCD: CPT | Performed by: NURSE PRACTITIONER

## 2018-08-22 PROCEDURE — 1101F PT FALLS ASSESS-DOCD LE1/YR: CPT | Performed by: NURSE PRACTITIONER

## 2018-08-22 PROCEDURE — 1036F TOBACCO NON-USER: CPT | Performed by: NURSE PRACTITIONER

## 2018-08-22 NOTE — LETTER
[address] [place] [id number]  Phone: [phone]  Fax: [phone]    CLAIRE Troy CNP        August 22, 2018     Patient: Bhavik Kaba   YOB: 1931   Date of Visit: 8/22/2018       To Whom It May Concern: It is my medical opinion that Lorenzo Woodson requires a disability parking placard for the following reasons:  She cannot walk 200 feet without stopping to rest.  Duration of need: 5 years    If you have any questions or concerns, please don't hesitate to call.     Sincerely,        CLAIRE Troy CNP
Cardiovascular:  · The apical impulse is not displaced  · Heart tones are crisp and normal. Irregular S1 and S2.  · Jugular venous pulsation Normal  · The carotid upstroke is normal in amplitude and contour without delay or bruit  · Peripheral pulses are symmetrical and full   Abdomen:  · No masses or tenderness  · Bowel sounds present  Extremities:  ·  No cyanosis or clubbing  ·  Trace lower extremity edema  ·  Skin: warm and dry  Neurological:  · Alert and oriented  · Moves all extremities well  · No abnormalities of mood, affect, memory, mentation, or behavior are noted    DATA:    ECG  8/2/2018:  Atrial fibrillation HR 77    CARDIOLOGY LABS:   CBC: No results for input(s): WBC, HGB, HCT, PLT in the last 72 hours. BMP: No results for input(s): NA, K, CO2, BUN, CREATININE, LABGLOM, GLUCOSE in the last 72 hours. PT/INR:   No results for input(s): PROTIME, INR in the last 72 hours. APTT:No results for input(s): APTT in the last 72 hours. FASTING LIPID PANEL:  Lab Results   Component Value Date    HDL 62 04/30/2018    LDLCALC 99 04/30/2018    TRIG 77 04/30/2018     LIVER PROFILE:No results for input(s): AST, ALT, ALB in the last 72 hours. Assessment:   1. Permanent atrial fibrillation: HR controlled   -on Coumadin for [id number] score 4 (age, gender, HTN)  2. HTN: controlled  3. Colon cancer: s/p right colectomy on 6/7/2016  4. Anemia: managed by Dr. Bri Grace, intolerant of iron supplements    Plan:   1. No changes today  2. Continue to monitor heart rate at home and call if consistently over 100  3. Call if swelling worsens or if develops any SOB  4. Follow up in one year    Emil Beaulieu, [address]  [phone]       If you have questions, please do not hesitate to call me. I look forward to following Jhonathan Mcwilliams along with you.     Sincerely,        Emil Beaulieu, APRN - CNP

## 2018-08-22 NOTE — PROGRESS NOTES
[facility]   Electrophysiology  Note              Date:  August 22, 2018  Patient name: Golden Ricketts  YOB: 1931    Primary Care physician: Gulshan Campos DO    HISTORY OF PRESENT ILLNESS: The patient is an 80 y.o.  female with a history of atrial fibrillation, HTN, HLD, and colon cancer. She was admitted to the hospital in 6/2016 for an elective right colectomy. Her rhythm converted to atrial fibrillation with RVR during her hospitalization. Her rate was controlled and she was discharged home on Cardizem, metoprolol, and Coumadin. At her visits in 7/2016 and 8/2016 her rhythm was sinus martínez and her metoprolol and diltiazem were decreased. She was back in afib in 2/2017 (asymptomatic) and has remained in AF at subsequent visits. Metoprolol was stopped in 8/2017 due to fatigue and dizziness. At her visit in 2/2018, she reported symptoms resolved off metoprolol. Today, she is being seen for atrial fibrillation. Her EKG shows atrial fibrillation with a HR of 77. She complains of some mild ankle swelling (states right is chronically larger than left). Denies chest pain, palpitations, shortness of breath, and dizziness. Past Medical History:   has a past medical history of Anemia; Atrial fibrillation ([address] Cancer ([address] Hyperlipidemia; and Hypertension. Past Surgical History:   has a past surgical history that includes Appendectomy; Hysterectomy; Colonoscopy (5/18/16); Upper gastrointestinal endoscopy (5/18/16); fracture surgery; and Colon surgery (Right, 6/7/16). Home Medications:    Prior to Admission medications    Medication Sig Start Date End Date Taking?  Authorizing Provider   clotrimazole-betamethasone (LOTRISONE) 1-0.05 % cream APPLY TWO TIMES A DAY 7/6/18   Gulshan Campos DO   warfarin (COUMADIN) 4 MG tablet TAKE ONE TABLET BY MOUTH DAILY 6/7/18   CLAIRE Rosario CNP   warfarin (COUMADIN) 4 MG tablet Take 6 mg Sun/Tues/Fri and 4 mg all other days

## 2018-08-29 RX ORDER — DILTIAZEM HYDROCHLORIDE 240 MG/1
CAPSULE, EXTENDED RELEASE ORAL
Qty: 90 CAPSULE | Refills: 5 | Status: SHIPPED | OUTPATIENT
Start: 2018-08-29 | End: 2018-10-22 | Stop reason: SDUPTHER

## 2018-09-04 ENCOUNTER — ANTI-COAG VISIT (OUTPATIENT)
Dept: PHARMACY | Age: 83
End: 2018-09-04
Payer: MEDICARE

## 2018-09-04 DIAGNOSIS — Z79.01 LONG TERM CURRENT USE OF ANTICOAGULANT THERAPY: ICD-10-CM

## 2018-09-04 LAB — INR BLD: 2.9

## 2018-09-04 PROCEDURE — 85610 PROTHROMBIN TIME: CPT | Performed by: INTERNAL MEDICINE

## 2018-09-04 PROCEDURE — 99211 OFF/OP EST MAY X REQ PHY/QHP: CPT | Performed by: INTERNAL MEDICINE

## 2018-09-04 NOTE — PROGRESS NOTES
Ms. Jaime Rangel is here for management of anticoagulation for Atrial Fibrillation. PMH also significant for HTN and HLD. She presents today w/out complaint. Pt verifies dosing regimen as listed above. Pt denies s/s bleeding/bruising/swelling/SOB. No BRBPR. No melena. Patient reports not taking any OTC/Herbals/RX. Reviewed dietary concerns. No ETOH or tobacco use. Pt was concerned about being at the higher end of the INR range. Pt was advised to have an additional salad every now & then. No changes with medications. INR 2.9 is within therapeutic range of 2-3. Recommend to continue 6mg Q Sun and 4mg daily all other days. Patient has 4 mg tablets. Will continue to monitor and check INR in 4 weeks  Dosing reminder card given with phone number, appointment date and time. Return to clinic: 10/2 @ 11:30 am     I have seen the patient and reviewed the progress note written by the PharmD Candidate. I agree with this assessment and plan.    Angelo Aguirre PharmD 9/4/2018 11:39 AM

## 2018-10-02 ENCOUNTER — ANTI-COAG VISIT (OUTPATIENT)
Dept: PHARMACY | Age: 83
End: 2018-10-02
Payer: MEDICARE

## 2018-10-02 DIAGNOSIS — Z79.01 LONG TERM CURRENT USE OF ANTICOAGULANT THERAPY: ICD-10-CM

## 2018-10-02 LAB — INTERNATIONAL NORMALIZATION RATIO, POC: 2.2

## 2018-10-02 PROCEDURE — 85610 PROTHROMBIN TIME: CPT

## 2018-10-02 PROCEDURE — 99211 OFF/OP EST MAY X REQ PHY/QHP: CPT

## 2018-10-16 DIAGNOSIS — Z85.038 HX OF MALIGNANT NEOPLASM OF COLON: ICD-10-CM

## 2018-10-16 DIAGNOSIS — Z00.00 ROUTINE MEDICAL EXAM: ICD-10-CM

## 2018-10-16 DIAGNOSIS — Z85.038 HX OF MALIGNANT NEOPLASM OF COLON: Primary | ICD-10-CM

## 2018-10-16 DIAGNOSIS — Z00.00 ROUTINE MEDICAL EXAM: Primary | ICD-10-CM

## 2018-10-16 LAB
A/G RATIO: 1.5 (ref 1.1–2.2)
ALBUMIN SERPL-MCNC: 4 G/DL (ref 3.4–5)
ALP BLD-CCNC: 101 U/L (ref 40–129)
ALT SERPL-CCNC: 22 U/L (ref 10–40)
ANION GAP SERPL CALCULATED.3IONS-SCNC: 14 MMOL/L (ref 3–16)
AST SERPL-CCNC: 21 U/L (ref 15–37)
BASOPHILS ABSOLUTE: 0 K/UL (ref 0–0.2)
BASOPHILS RELATIVE PERCENT: 0.9 %
BILIRUB SERPL-MCNC: 0.5 MG/DL (ref 0–1)
BILIRUBIN DIRECT: <0.2 MG/DL (ref 0–0.3)
BILIRUBIN, INDIRECT: NORMAL MG/DL (ref 0–1)
BUN BLDV-MCNC: 14 MG/DL (ref 7–20)
CALCIUM SERPL-MCNC: 9.1 MG/DL (ref 8.3–10.6)
CEA: 3.7 NG/ML (ref 0–5)
CHLORIDE BLD-SCNC: 105 MMOL/L (ref 99–110)
CHOLESTEROL, TOTAL: 154 MG/DL (ref 0–199)
CO2: 26 MMOL/L (ref 21–32)
CREAT SERPL-MCNC: 0.9 MG/DL (ref 0.6–1.2)
EOSINOPHILS ABSOLUTE: 0 K/UL (ref 0–0.6)
EOSINOPHILS RELATIVE PERCENT: 1 %
GFR AFRICAN AMERICAN: >60
GFR NON-AFRICAN AMERICAN: 59
GLOBULIN: 2.7 G/DL
GLUCOSE BLD-MCNC: 123 MG/DL (ref 70–99)
HCT VFR BLD CALC: 47.9 % (ref 36–48)
HDLC SERPL-MCNC: 53 MG/DL (ref 40–60)
HEMOGLOBIN: 15.8 G/DL (ref 12–16)
LDL CHOLESTEROL CALCULATED: 84 MG/DL
LYMPHOCYTES ABSOLUTE: 1.2 K/UL (ref 1–5.1)
LYMPHOCYTES RELATIVE PERCENT: 28.1 %
MCH RBC QN AUTO: 32.9 PG (ref 26–34)
MCHC RBC AUTO-ENTMCNC: 33 G/DL (ref 31–36)
MCV RBC AUTO: 99.7 FL (ref 80–100)
MONOCYTES ABSOLUTE: 0.5 K/UL (ref 0–1.3)
MONOCYTES RELATIVE PERCENT: 10.6 %
NEUTROPHILS ABSOLUTE: 2.6 K/UL (ref 1.7–7.7)
NEUTROPHILS RELATIVE PERCENT: 59.4 %
PDW BLD-RTO: 14.8 % (ref 12.4–15.4)
PLATELET # BLD: 191 K/UL (ref 135–450)
PMV BLD AUTO: 9.7 FL (ref 5–10.5)
POTASSIUM SERPL-SCNC: 4.2 MMOL/L (ref 3.5–5.1)
RBC # BLD: 4.81 M/UL (ref 4–5.2)
SODIUM BLD-SCNC: 145 MMOL/L (ref 136–145)
TOTAL PROTEIN: 6.7 G/DL (ref 6.4–8.2)
TRIGL SERPL-MCNC: 85 MG/DL (ref 0–150)
TSH REFLEX: 2.64 UIU/ML (ref 0.27–4.2)
VLDLC SERPL CALC-MCNC: 17 MG/DL
WBC # BLD: 4.4 K/UL (ref 4–11)

## 2018-10-22 ENCOUNTER — OFFICE VISIT (OUTPATIENT)
Dept: FAMILY MEDICINE CLINIC | Age: 83
End: 2018-10-22
Payer: MEDICARE

## 2018-10-22 VITALS
HEIGHT: 64 IN | BODY MASS INDEX: 26.63 KG/M2 | SYSTOLIC BLOOD PRESSURE: 136 MMHG | OXYGEN SATURATION: 98 % | DIASTOLIC BLOOD PRESSURE: 84 MMHG | WEIGHT: 156 LBS | HEART RATE: 78 BPM

## 2018-10-22 DIAGNOSIS — I48.21 PERMANENT ATRIAL FIBRILLATION (HCC): ICD-10-CM

## 2018-10-22 DIAGNOSIS — I10 ESSENTIAL HYPERTENSION: Primary | ICD-10-CM

## 2018-10-22 DIAGNOSIS — Z23 NEED FOR PNEUMOCOCCAL VACCINATION: ICD-10-CM

## 2018-10-22 DIAGNOSIS — E78.2 MIXED HYPERLIPIDEMIA: ICD-10-CM

## 2018-10-22 PROCEDURE — G8427 DOCREV CUR MEDS BY ELIG CLIN: HCPCS | Performed by: FAMILY MEDICINE

## 2018-10-22 PROCEDURE — G8417 CALC BMI ABV UP PARAM F/U: HCPCS | Performed by: FAMILY MEDICINE

## 2018-10-22 PROCEDURE — 1101F PT FALLS ASSESS-DOCD LE1/YR: CPT | Performed by: FAMILY MEDICINE

## 2018-10-22 PROCEDURE — 4040F PNEUMOC VAC/ADMIN/RCVD: CPT | Performed by: FAMILY MEDICINE

## 2018-10-22 PROCEDURE — 90732 PPSV23 VACC 2 YRS+ SUBQ/IM: CPT | Performed by: FAMILY MEDICINE

## 2018-10-22 PROCEDURE — 1090F PRES/ABSN URINE INCON ASSESS: CPT | Performed by: FAMILY MEDICINE

## 2018-10-22 PROCEDURE — 99214 OFFICE O/P EST MOD 30 MIN: CPT | Performed by: FAMILY MEDICINE

## 2018-10-22 PROCEDURE — 1123F ACP DISCUSS/DSCN MKR DOCD: CPT | Performed by: FAMILY MEDICINE

## 2018-10-22 PROCEDURE — G8484 FLU IMMUNIZE NO ADMIN: HCPCS | Performed by: FAMILY MEDICINE

## 2018-10-22 PROCEDURE — 1036F TOBACCO NON-USER: CPT | Performed by: FAMILY MEDICINE

## 2018-10-22 PROCEDURE — G0009 ADMIN PNEUMOCOCCAL VACCINE: HCPCS | Performed by: FAMILY MEDICINE

## 2018-10-22 RX ORDER — ATORVASTATIN CALCIUM 20 MG/1
TABLET, FILM COATED ORAL
Qty: 30 TABLET | Refills: 11 | Status: SHIPPED | OUTPATIENT
Start: 2018-10-22 | End: 2019-04-24 | Stop reason: SDUPTHER

## 2018-10-22 RX ORDER — OMEPRAZOLE 40 MG/1
CAPSULE, DELAYED RELEASE ORAL
Qty: 30 CAPSULE | Refills: 5 | Status: SHIPPED | OUTPATIENT
Start: 2018-10-22 | End: 2019-04-24 | Stop reason: SDUPTHER

## 2018-10-22 RX ORDER — WARFARIN SODIUM 4 MG/1
TABLET ORAL
Qty: 90 TABLET | Refills: 2 | Status: SHIPPED | OUTPATIENT
Start: 2018-10-22 | End: 2019-07-22 | Stop reason: SDUPTHER

## 2018-10-22 RX ORDER — DILTIAZEM HYDROCHLORIDE 240 MG/1
CAPSULE, COATED, EXTENDED RELEASE ORAL
Qty: 90 CAPSULE | Refills: 5 | Status: SHIPPED | OUTPATIENT
Start: 2018-10-22 | End: 2019-10-22 | Stop reason: SDUPTHER

## 2018-10-22 ASSESSMENT — ENCOUNTER SYMPTOMS
COUGH: 0
DIARRHEA: 0
TROUBLE SWALLOWING: 0
BLOOD IN STOOL: 0
WHEEZING: 0
SORE THROAT: 0
CONSTIPATION: 0
SHORTNESS OF BREATH: 0

## 2018-10-22 NOTE — PROGRESS NOTES
a normal mood and affect. Her behavior is normal. Thought content normal.       Assessment:        Hyperlipidemia, treated      Hypertension, treated, controlled  Atrial fibrillation, treated, controlled       Plan:         Labs reviewed. Thyroid and lipids ok  Mild inc FBS. Watch portions, no new meds    Same meds    Has pneumovax before age 72-  Will booster  Had flu vaccine this year    Prior to Visit Medications    Medication Sig Taking?  Authorizing Provider   atorvastatin (LIPITOR) 20 MG tablet TAKE ONE TABLET BY MOUTH DAILY Yes Shirin Hathaway DO   warfarin (COUMADIN) 4 MG tablet One daily except 1-1/2 tabs on Sunday Yes Shirin Hathaway DO   diltiazem (CARTIA XT) 240 MG extended release capsule TAKE ONE CAPSULE BY MOUTH DAILY Yes Shirin Hathaway DO   omeprazole (PRILOSEC) 40 MG delayed release capsule TAKE ONE CAPSULE BY MOUTH DAILY Yes Shirin Hathaway DO   clotrimazole-betamethasone ([address]) 1-0.05 % cream APPLY TWO TIMES A DAY Yes Shirin Hathaway, DO Shirin Hathaway DO

## 2018-11-06 ENCOUNTER — ANTI-COAG VISIT (OUTPATIENT)
Dept: PHARMACY | Age: 83
End: 2018-11-06
Payer: MEDICARE

## 2018-11-06 DIAGNOSIS — Z79.01 LONG TERM CURRENT USE OF ANTICOAGULANT THERAPY: ICD-10-CM

## 2018-11-06 LAB — INR BLD: 2.6

## 2018-11-06 PROCEDURE — 99211 OFF/OP EST MAY X REQ PHY/QHP: CPT | Performed by: FAMILY MEDICINE

## 2018-11-06 PROCEDURE — 85610 PROTHROMBIN TIME: CPT | Performed by: FAMILY MEDICINE

## 2018-12-11 ENCOUNTER — ANTI-COAG VISIT (OUTPATIENT)
Dept: PHARMACY | Age: 83
End: 2018-12-11
Payer: MEDICARE

## 2018-12-11 DIAGNOSIS — Z79.01 LONG TERM CURRENT USE OF ANTICOAGULANT THERAPY: ICD-10-CM

## 2018-12-11 LAB — INR BLD: 3.4

## 2018-12-11 PROCEDURE — 85610 PROTHROMBIN TIME: CPT | Performed by: INTERNAL MEDICINE

## 2018-12-11 PROCEDURE — 99211 OFF/OP EST MAY X REQ PHY/QHP: CPT | Performed by: INTERNAL MEDICINE

## 2018-12-11 NOTE — PROGRESS NOTES
Ms. Cristal Romero is here for management of anticoagulation for Atrial Fibrillation. PMH also significant for HTN and HLD. She presents today w/out complaint. Pt verifies dosing regimen as listed above. Pt denies s/s bleeding/bruising/swelling/SOB. No BRBPR. No melena. Patient reports not taking any OTC/Herbals/RX. Reviewed dietary concerns. Eating less salads now than in the summer and continues to do so. No ETOH or tobacco use. No changes with medications. INR 3.4 is above therapeutic range of 2-3. Recommend to take 2 mg today, then decrease to 4 mg daily due to pt plan to continue leaving greens out of diet. Patient has 4 mg tablets. Will continue to monitor and check INR in 5 weeks  Dosing reminder card given with phone number, appointment date and time. Return to clinic:  1/15 @ 1130 am     Pete De Souza, PharmD Candidate 2019 12/11/2018 11:28 AM    I have seen the patient and reviewed the progress note written by the PharmD Candidate. I agree with this assessment and plan.    Bari Plascencia PharmD 12/11/2018 1:24 PM

## 2018-12-12 ENCOUNTER — HOSPITAL ENCOUNTER (OUTPATIENT)
Dept: MAMMOGRAPHY | Age: 83
Discharge: HOME | End: 2018-12-12
Payer: MEDICARE

## 2018-12-12 DIAGNOSIS — Z12.39 BREAST CANCER SCREENING: ICD-10-CM

## 2018-12-12 PROCEDURE — 77063 BREAST TOMOSYNTHESIS BI: CPT

## 2019-01-15 ENCOUNTER — ANTI-COAG VISIT (OUTPATIENT)
Dept: PHARMACY | Age: 84
End: 2019-01-15
Payer: MEDICARE

## 2019-01-15 DIAGNOSIS — Z79.01 LONG TERM CURRENT USE OF ANTICOAGULANT THERAPY: ICD-10-CM

## 2019-01-15 LAB — INR BLD: 2.2

## 2019-01-15 PROCEDURE — 85610 PROTHROMBIN TIME: CPT | Performed by: FAMILY MEDICINE

## 2019-01-15 PROCEDURE — 99211 OFF/OP EST MAY X REQ PHY/QHP: CPT | Performed by: FAMILY MEDICINE

## 2019-02-19 ENCOUNTER — ANTI-COAG VISIT (OUTPATIENT)
Dept: PHARMACY | Age: 84
End: 2019-02-19
Payer: MEDICARE

## 2019-02-19 LAB — INTERNATIONAL NORMALIZATION RATIO, POC: 2.6

## 2019-02-19 PROCEDURE — 99211 OFF/OP EST MAY X REQ PHY/QHP: CPT

## 2019-02-19 PROCEDURE — 85610 PROTHROMBIN TIME: CPT

## 2019-03-26 ENCOUNTER — ANTI-COAG VISIT (OUTPATIENT)
Dept: PHARMACY | Age: 84
End: 2019-03-26
Payer: MEDICARE

## 2019-03-26 DIAGNOSIS — Z79.01 LONG TERM CURRENT USE OF ANTICOAGULANT THERAPY: ICD-10-CM

## 2019-03-26 LAB — INR BLD: 2.3

## 2019-03-26 PROCEDURE — 99211 OFF/OP EST MAY X REQ PHY/QHP: CPT

## 2019-03-26 PROCEDURE — 85610 PROTHROMBIN TIME: CPT

## 2019-04-08 ENCOUNTER — TELEPHONE (OUTPATIENT)
Dept: FAMILY MEDICINE CLINIC | Age: 84
End: 2019-04-08

## 2019-04-09 DIAGNOSIS — E78.2 MIXED HYPERLIPIDEMIA: Primary | ICD-10-CM

## 2019-04-09 DIAGNOSIS — I10 ESSENTIAL HYPERTENSION: ICD-10-CM

## 2019-04-09 DIAGNOSIS — E55.9 VITAMIN D DEFICIENCY: ICD-10-CM

## 2019-04-17 DIAGNOSIS — E55.9 VITAMIN D DEFICIENCY: ICD-10-CM

## 2019-04-17 DIAGNOSIS — C18.2 MALIGNANT NEOPLASM OF ASCENDING COLON (HCC): ICD-10-CM

## 2019-04-17 DIAGNOSIS — E78.2 MIXED HYPERLIPIDEMIA: ICD-10-CM

## 2019-04-17 DIAGNOSIS — C18.2 MALIGNANT NEOPLASM OF ASCENDING COLON (HCC): Primary | ICD-10-CM

## 2019-04-17 DIAGNOSIS — I10 ESSENTIAL HYPERTENSION: ICD-10-CM

## 2019-04-17 LAB
A/G RATIO: 1.4 (ref 1.1–2.2)
ALBUMIN SERPL-MCNC: 4.2 G/DL (ref 3.4–5)
ALP BLD-CCNC: 132 U/L (ref 40–129)
ALT SERPL-CCNC: 22 U/L (ref 10–40)
ANION GAP SERPL CALCULATED.3IONS-SCNC: 16 MMOL/L (ref 3–16)
AST SERPL-CCNC: 22 U/L (ref 15–37)
BASOPHILS ABSOLUTE: 0 K/UL (ref 0–0.2)
BASOPHILS RELATIVE PERCENT: 0.7 %
BILIRUB SERPL-MCNC: 0.6 MG/DL (ref 0–1)
BUN BLDV-MCNC: 14 MG/DL (ref 7–20)
CALCIUM SERPL-MCNC: 9 MG/DL (ref 8.3–10.6)
CEA: 3.5 NG/ML (ref 0–5)
CHLORIDE BLD-SCNC: 103 MMOL/L (ref 99–110)
CHOLESTEROL, TOTAL: 159 MG/DL (ref 0–199)
CO2: 24 MMOL/L (ref 21–32)
CREAT SERPL-MCNC: 0.9 MG/DL (ref 0.6–1.2)
EOSINOPHILS ABSOLUTE: 0.1 K/UL (ref 0–0.6)
EOSINOPHILS RELATIVE PERCENT: 1.3 %
GFR AFRICAN AMERICAN: >60
GFR NON-AFRICAN AMERICAN: 59
GLOBULIN: 2.9 G/DL
GLUCOSE BLD-MCNC: 135 MG/DL (ref 70–99)
HCT VFR BLD CALC: 48.2 % (ref 36–48)
HDLC SERPL-MCNC: 50 MG/DL (ref 40–60)
HEMOGLOBIN: 15.9 G/DL (ref 12–16)
LDL CHOLESTEROL CALCULATED: 90 MG/DL
LYMPHOCYTES ABSOLUTE: 1.2 K/UL (ref 1–5.1)
LYMPHOCYTES RELATIVE PERCENT: 27.8 %
MCH RBC QN AUTO: 33.5 PG (ref 26–34)
MCHC RBC AUTO-ENTMCNC: 33.1 G/DL (ref 31–36)
MCV RBC AUTO: 101.2 FL (ref 80–100)
MONOCYTES ABSOLUTE: 0.5 K/UL (ref 0–1.3)
MONOCYTES RELATIVE PERCENT: 11.7 %
NEUTROPHILS ABSOLUTE: 2.5 K/UL (ref 1.7–7.7)
NEUTROPHILS RELATIVE PERCENT: 58.5 %
PDW BLD-RTO: 14.5 % (ref 12.4–15.4)
PLATELET # BLD: 176 K/UL (ref 135–450)
PMV BLD AUTO: 10.2 FL (ref 5–10.5)
POTASSIUM SERPL-SCNC: 4 MMOL/L (ref 3.5–5.1)
RBC # BLD: 4.76 M/UL (ref 4–5.2)
SODIUM BLD-SCNC: 143 MMOL/L (ref 136–145)
TOTAL PROTEIN: 7.1 G/DL (ref 6.4–8.2)
TRIGL SERPL-MCNC: 97 MG/DL (ref 0–150)
VLDLC SERPL CALC-MCNC: 19 MG/DL
WBC # BLD: 4.3 K/UL (ref 4–11)

## 2019-04-18 LAB — VITAMIN D 25-HYDROXY: 19.1 NG/ML

## 2019-04-23 ENCOUNTER — ANTI-COAG VISIT (OUTPATIENT)
Dept: PHARMACY | Age: 84
End: 2019-04-23
Payer: MEDICARE

## 2019-04-23 DIAGNOSIS — Z79.01 LONG TERM CURRENT USE OF ANTICOAGULANT THERAPY: ICD-10-CM

## 2019-04-23 LAB — INR BLD: 2

## 2019-04-23 PROCEDURE — 85610 PROTHROMBIN TIME: CPT | Performed by: FAMILY MEDICINE

## 2019-04-23 PROCEDURE — 99211 OFF/OP EST MAY X REQ PHY/QHP: CPT | Performed by: FAMILY MEDICINE

## 2019-04-24 ENCOUNTER — OFFICE VISIT (OUTPATIENT)
Dept: FAMILY MEDICINE CLINIC | Age: 84
End: 2019-04-24
Payer: MEDICARE

## 2019-04-24 VITALS
BODY MASS INDEX: 27.49 KG/M2 | SYSTOLIC BLOOD PRESSURE: 154 MMHG | HEIGHT: 64 IN | DIASTOLIC BLOOD PRESSURE: 90 MMHG | HEART RATE: 76 BPM | WEIGHT: 161 LBS | OXYGEN SATURATION: 98 %

## 2019-04-24 DIAGNOSIS — I10 ESSENTIAL HYPERTENSION: Primary | ICD-10-CM

## 2019-04-24 DIAGNOSIS — I48.21 PERMANENT ATRIAL FIBRILLATION (HCC): ICD-10-CM

## 2019-04-24 DIAGNOSIS — C18.2 MALIGNANT NEOPLASM OF ASCENDING COLON (HCC): ICD-10-CM

## 2019-04-24 DIAGNOSIS — E78.2 MIXED HYPERLIPIDEMIA: ICD-10-CM

## 2019-04-24 DIAGNOSIS — Z90.49 STATUS POST PARTIAL RESECTION OF COLON: ICD-10-CM

## 2019-04-24 DIAGNOSIS — R73.01 IMPAIRED FASTING BLOOD SUGAR: ICD-10-CM

## 2019-04-24 LAB — HBA1C MFR BLD: 6.5 %

## 2019-04-24 PROCEDURE — 1090F PRES/ABSN URINE INCON ASSESS: CPT | Performed by: FAMILY MEDICINE

## 2019-04-24 PROCEDURE — G8427 DOCREV CUR MEDS BY ELIG CLIN: HCPCS | Performed by: FAMILY MEDICINE

## 2019-04-24 PROCEDURE — G8417 CALC BMI ABV UP PARAM F/U: HCPCS | Performed by: FAMILY MEDICINE

## 2019-04-24 PROCEDURE — 99214 OFFICE O/P EST MOD 30 MIN: CPT | Performed by: FAMILY MEDICINE

## 2019-04-24 PROCEDURE — 83036 HEMOGLOBIN GLYCOSYLATED A1C: CPT | Performed by: FAMILY MEDICINE

## 2019-04-24 PROCEDURE — 1123F ACP DISCUSS/DSCN MKR DOCD: CPT | Performed by: FAMILY MEDICINE

## 2019-04-24 PROCEDURE — 1036F TOBACCO NON-USER: CPT | Performed by: FAMILY MEDICINE

## 2019-04-24 PROCEDURE — 4040F PNEUMOC VAC/ADMIN/RCVD: CPT | Performed by: FAMILY MEDICINE

## 2019-04-24 RX ORDER — ATORVASTATIN CALCIUM 20 MG/1
TABLET, FILM COATED ORAL
Qty: 30 TABLET | Refills: 11 | Status: SHIPPED | OUTPATIENT
Start: 2019-04-24 | End: 2019-10-22 | Stop reason: SDUPTHER

## 2019-04-24 RX ORDER — OMEPRAZOLE 40 MG/1
CAPSULE, DELAYED RELEASE ORAL
Qty: 30 CAPSULE | Refills: 11 | Status: SHIPPED | OUTPATIENT
Start: 2019-04-24 | End: 2019-10-22 | Stop reason: SDUPTHER

## 2019-04-24 ASSESSMENT — PATIENT HEALTH QUESTIONNAIRE - PHQ9
SUM OF ALL RESPONSES TO PHQ QUESTIONS 1-9: 0
2. FEELING DOWN, DEPRESSED OR HOPELESS: 0
1. LITTLE INTEREST OR PLEASURE IN DOING THINGS: 0
SUM OF ALL RESPONSES TO PHQ QUESTIONS 1-9: 0
SUM OF ALL RESPONSES TO PHQ9 QUESTIONS 1 & 2: 0

## 2019-04-24 ASSESSMENT — ENCOUNTER SYMPTOMS
RESPIRATORY NEGATIVE: 1
GASTROINTESTINAL NEGATIVE: 1

## 2019-04-24 NOTE — Clinical Note
Should Bernette Harada have a follow up colonoscopy? She is 2 years out from her resection?   Tena Vuong

## 2019-04-24 NOTE — PROGRESS NOTES
Subjective:      Patient ID: Juliet Liao is a 80 y.o. y.o. female. Here to follow meds / BP  meds and hx reviewed. Son lives with her. Independent ADL's  Drives, works in the yard ,  There is no interval hx of hospitalization or significant illness'  HPI      Chief Complaint   Patient presents with    Diabetes     follow up     Hypertension       Allergies   Allergen Reactions    Amoxicillin Hives     Questionable allergy      Sulfa Antibiotics Hives       Past Medical History:   Diagnosis Date    Anemia 2016    Atrial fibrillation ([facility] [address] 2006    RESOLVED, ONE INCIDENT    Cancer ([facility] [address] 2016    colon cancer    Hyperlipidemia     Hypertension     HAS BEEN RUNNING NORMAL       Past Surgical History:   Procedure Laterality Date    APPENDECTOMY      COLON SURGERY Right 6/7/16    Robotic Assisted Laparoscopic Right Colectomy    COLONOSCOPY  5/18/16    ascending colon mass, biopsy    FRACTURE SURGERY      Right ORIF ANKLE    HYSTERECTOMY      BSO    UPPER GASTROINTESTINAL ENDOSCOPY  5/18/16    biopsy gastric       Social History     Socioeconomic History    Marital status:       Spouse name: Not on file    Number of children: Not on file    Years of education: Not on file    Highest education level: Not on file   Occupational History    Not on file   Social Needs    Financial resource strain: Not on file    Food insecurity:     Worry: Not on file     Inability: Not on file    Transportation needs:     Medical: Not on file     Non-medical: Not on file   Tobacco Use    Smoking status: Never Smoker    Smokeless tobacco: Never Used   Substance and Sexual Activity    Alcohol use: No     Alcohol/week: 0.0 oz    Drug use: No    Sexual activity: Not Currently   Lifestyle    Physical activity:     Days per week: Not on file     Minutes per session: Not on file    Stress: Not on file   Relationships    Social connections:     Talks on phone: Not on file     Gets together: Not on file Attends Sikhism service: Not on file     Active member of club or organization: Not on file     Attends meetings of clubs or organizations: Not on file     Relationship status: Not on file    Intimate partner violence:     Fear of current or ex partner: Not on file     Emotionally abused: Not on file     Physically abused: Not on file     Forced sexual activity: Not on file   Other Topics Concern    Not on file   Social History Narrative    Not on file       Family History   Problem Relation Age of Onset    Cancer Mother         breast       Vitals:    04/24/19 1044   BP: (!) 154/90   Pulse:    SpO2:        Wt Readings from Last 3 Encounters:   04/24/19 161 lb (73 kg)   10/22/18 156 lb (70.8 kg)   08/22/18 162 lb (73.5 kg)       Review of Systems   Constitutional: Negative. Negative for unexpected weight change. Respiratory: Negative. Cardiovascular:        Ch A-fib. Rare feeling palpitation  No angina  Some pedal edema   Gastrointestinal: Negative. Genitourinary: Positive for urgency. Negative for dysuria, frequency and hematuria. Musculoskeletal: Positive for arthralgias. Neurological: Negative for dizziness, light-headedness, numbness and headaches. Psychiatric/Behavioral: Negative for dysphoric mood and sleep disturbance. The patient is not nervous/anxious. Objective:   Physical Exam   Constitutional: She is oriented to person, place, and time. She appears well-developed and well-nourished. No distress. Eyes: EOM are normal. Right eye exhibits no discharge. No scleral icterus. Neck: No thyromegaly present. Cardiovascular: Normal rate and normal heart sounds. An irregularly irregular rhythm present. No murmur heard. Irregular    Pulmonary/Chest: Breath sounds normal. No respiratory distress. She has no wheezes. Abdominal: She exhibits no distension and no mass. There is no tenderness.    Musculoskeletal:   Trace pedal edema  Right ankle some ch swelling 2/2 olf fx Lymphadenopathy:     She has no cervical adenopathy. Neurological: She is alert and oriented to person, place, and time. No cranial nerve deficit. Skin: Skin is warm and dry. Psychiatric: She has a normal mood and affect. Her behavior is normal. Thought content normal.       Assessment:      Hypertension  Hyperlipidemia, treated  Chronic A. Fib, treated, managed  History of carcinoma of the colon, status post right colon resection  Vitamin D deficiency        Plan:     A1c  6.5   Pre-diabetes. Limit the sweets and cakes/ candies  Lipids, renal panel, hepatic panel are all fine. CEA is low. Blood pressure is controlled with the current medication. Her chronic A. Fib is controlled and rate with the chronic medication. She is hemodynamically and cardiac stable other than the irregular rhythm. Patient is maintained on warfarin therapy, guided by the cardiologist, and managed through the Coumadin clinic at [facility].  She is clinically stable. We will also have been good and the patient is compliant with all therapies. Vitamin D low-  1000 IU daily supplement recommended. This is over-the-counter. Patient looks well. She is clinically doing well. I'm not sure of the considerations for follow-up colonoscopy, and I discussed this with the patient. I will ask the surgeon For his input. Continue the same medications and treatments. Follow-up in 6 months.       Same meds    Current Outpatient Medications   Medication Sig Dispense Refill    Loratadine (CLARITIN PO) Take by mouth Indications: prn allergies      atorvastatin (LIPITOR) 20 MG tablet TAKE ONE TABLET BY MOUTH DAILY 30 tablet 11    warfarin (COUMADIN) 4 MG tablet One daily except 1-1/2 tabs on Sunday 90 tablet 2    diltiazem (CARTIA XT) 240 MG extended release capsule TAKE ONE CAPSULE BY MOUTH DAILY 90 capsule 5    omeprazole (PRILOSEC) 40 MG delayed release capsule TAKE ONE CAPSULE BY MOUTH DAILY 30 capsule 5    clotrimazole-betamethasone (LOTRISONE) 1-0.05 % cream APPLY TWO TIMES A DAY 15 g 2     No current facility-administered medications for this visit.

## 2019-04-24 NOTE — PATIENT INSTRUCTIONS
Continue the current medications    Get some Vitamin D 3 supplement-  1000 IU daily    See me 6 months  Cut back a little on sweets and cakes

## 2019-05-28 ENCOUNTER — ANTI-COAG VISIT (OUTPATIENT)
Dept: PHARMACY | Age: 84
End: 2019-05-28
Payer: MEDICARE

## 2019-05-28 DIAGNOSIS — Z79.01 LONG TERM CURRENT USE OF ANTICOAGULANT THERAPY: ICD-10-CM

## 2019-05-28 LAB — INR BLD: 2

## 2019-05-28 PROCEDURE — 85610 PROTHROMBIN TIME: CPT

## 2019-05-28 PROCEDURE — 99211 OFF/OP EST MAY X REQ PHY/QHP: CPT

## 2019-05-28 NOTE — PROGRESS NOTES
Ms. Vijay Rapp is here for management of anticoagulation for Atrial Fibrillation. PMH also significant for HTN and HLD. She presents today w/out complaint. Pt verifies dosing regimen as listed above. Pt denies s/s bleeding/bruising/swelling/SOB. No BRBPR. No melena. Patient reports not taking any OTC/Herbals/RX. Reviewed dietary concerns. Eating less salads now than in the summer and continues to do so. No ETOH or tobacco use. No changes with medications. INR 2.0 is within the therapeutic range of 2-3. Recommend to continue 4 mg daily   Patient has 4 mg tablets. Will continue to monitor and check INR in 5 weeks  Dosing reminder card given with phone number, appointment date and time.    Return to clinic: 7/2 @ [phone]   Referring Physician: Dilia Arevalo

## 2019-07-02 ENCOUNTER — ANTI-COAG VISIT (OUTPATIENT)
Dept: PHARMACY | Age: 84
End: 2019-07-02
Payer: MEDICARE

## 2019-07-02 DIAGNOSIS — Z79.01 LONG TERM CURRENT USE OF ANTICOAGULANT THERAPY: ICD-10-CM

## 2019-07-02 LAB — INR BLD: 1.9

## 2019-07-02 PROCEDURE — 99211 OFF/OP EST MAY X REQ PHY/QHP: CPT

## 2019-07-02 PROCEDURE — 85610 PROTHROMBIN TIME: CPT

## 2019-07-02 NOTE — PROGRESS NOTES
Ms. Alonzo John is here for management of anticoagulation for Atrial Fibrillation. PMH also significant for HTN and HLD. She presents today w/out complaint. Pt verifies dosing regimen as listed above. Pt denies s/s bleeding/bruising/swelling/SOB. No BRBPR. No melena. Patient reports not taking any OTC/Herbals/RX. Reviewed dietary concerns. Eating less salads now than in the summer and continues to do so. No ETOH or tobacco use. No changes with medications. INR 1.9 is within acceptable therapeutic range of 2-3. Recommend to continue 4 mg daily   Patient has 4 mg tablets. Will continue to monitor and check INR in 5 weeks  Dosing reminder card given with phone number, appointment date and time.    Return to clinic: 8/6 @ [phone]   Referring Physician: Boni Baker

## 2019-07-22 DIAGNOSIS — I48.21 PERMANENT ATRIAL FIBRILLATION (HCC): ICD-10-CM

## 2019-07-22 NOTE — TELEPHONE ENCOUNTER
Future Appointments   Date Time Provider Tracey Lambert   8/6/2019 11:30 AM Norma 19 None     LOV 4/24/2019

## 2019-07-23 RX ORDER — WARFARIN SODIUM 4 MG/1
TABLET ORAL
Qty: 30 TABLET | Refills: 5 | Status: SHIPPED | OUTPATIENT
Start: 2019-07-23 | End: 2019-10-22 | Stop reason: SDUPTHER

## 2019-08-06 ENCOUNTER — ANTI-COAG VISIT (OUTPATIENT)
Dept: PHARMACY | Age: 84
End: 2019-08-06
Payer: MEDICARE

## 2019-08-06 DIAGNOSIS — Z79.01 LONG TERM CURRENT USE OF ANTICOAGULANT THERAPY: ICD-10-CM

## 2019-08-06 LAB — INR BLD: 2

## 2019-08-06 PROCEDURE — 85610 PROTHROMBIN TIME: CPT | Performed by: FAMILY MEDICINE

## 2019-08-06 PROCEDURE — 99211 OFF/OP EST MAY X REQ PHY/QHP: CPT | Performed by: FAMILY MEDICINE

## 2019-09-10 ENCOUNTER — OFFICE VISIT (OUTPATIENT)
Dept: CARDIOLOGY CLINIC | Age: 84
End: 2019-09-10
Payer: MEDICARE

## 2019-09-10 ENCOUNTER — ANTI-COAG VISIT (OUTPATIENT)
Dept: PHARMACY | Age: 84
End: 2019-09-10
Payer: MEDICARE

## 2019-09-10 VITALS
HEART RATE: 86 BPM | OXYGEN SATURATION: 98 % | WEIGHT: 148.6 LBS | HEIGHT: 63 IN | SYSTOLIC BLOOD PRESSURE: 154 MMHG | BODY MASS INDEX: 26.33 KG/M2 | DIASTOLIC BLOOD PRESSURE: 80 MMHG

## 2019-09-10 DIAGNOSIS — I48.21 PERMANENT ATRIAL FIBRILLATION (HCC): Primary | ICD-10-CM

## 2019-09-10 DIAGNOSIS — I10 ESSENTIAL HYPERTENSION: ICD-10-CM

## 2019-09-10 DIAGNOSIS — Z79.01 LONG TERM CURRENT USE OF ANTICOAGULANT THERAPY: ICD-10-CM

## 2019-09-10 LAB — INR BLD: 2.1

## 2019-09-10 PROCEDURE — 85610 PROTHROMBIN TIME: CPT

## 2019-09-10 PROCEDURE — 99213 OFFICE O/P EST LOW 20 MIN: CPT | Performed by: NURSE PRACTITIONER

## 2019-09-10 PROCEDURE — 4040F PNEUMOC VAC/ADMIN/RCVD: CPT | Performed by: NURSE PRACTITIONER

## 2019-09-10 PROCEDURE — 1036F TOBACCO NON-USER: CPT | Performed by: NURSE PRACTITIONER

## 2019-09-10 PROCEDURE — 93000 ELECTROCARDIOGRAM COMPLETE: CPT | Performed by: NURSE PRACTITIONER

## 2019-09-10 PROCEDURE — G8417 CALC BMI ABV UP PARAM F/U: HCPCS | Performed by: NURSE PRACTITIONER

## 2019-09-10 PROCEDURE — G8427 DOCREV CUR MEDS BY ELIG CLIN: HCPCS | Performed by: NURSE PRACTITIONER

## 2019-09-10 PROCEDURE — 99211 OFF/OP EST MAY X REQ PHY/QHP: CPT

## 2019-09-10 PROCEDURE — 1090F PRES/ABSN URINE INCON ASSESS: CPT | Performed by: NURSE PRACTITIONER

## 2019-09-10 PROCEDURE — 1123F ACP DISCUSS/DSCN MKR DOCD: CPT | Performed by: NURSE PRACTITIONER

## 2019-09-10 NOTE — PROGRESS NOTES
Ms. Vamsi Arrington is here for management of anticoagulation for Atrial Fibrillation. PMH also significant for HTN and HLD. She presents today w/out complaint. Pt verifies dosing regimen as listed above. Pt denies s/s bleeding/bruising/swelling/SOB. No BRBPR. No melena. Patient reports not taking any OTC/Herbals/RX. Reviewed dietary concerns. Eating less salads now than in the summer and continues to do so. No ETOH or tobacco use. No changes with medications. INR 2.1 is within acceptable therapeutic range of 2-3. Recommend to continue 4 mg daily. Patient has 4 mg tablets. Will continue to monitor and check INR in 5 weeks  Dosing reminder card given with phone number, appointment date and time.    Return to clinic: 10/15 @   Referring Physician: Damon Singh

## 2019-09-10 NOTE — LETTER
MOUTH DAILY 10/22/18  Yes Chaneta Grapes, DO   Loratadine (CLARITIN PO) Take by mouth Indications: prn allergies    Historical Provider, MD   clotrimazole-betamethasone (LOTRISONE) 1-0.05 % cream APPLY TWO TIMES A DAY  Patient not taking: Reported on 9/10/2019 7/6/18   Chaneta Grapes, DO       Allergies:  Amoxicillin and Sulfa antibiotics    Social History:   reports that she has never smoked. She has never used smokeless tobacco. She reports that she does not drink alcohol or use drugs. Family History: family history includes Cancer in her mother. Review of Systems   Constitutional: Negative  HENT: Negative. Eyes: Negative. Respiratory: Negative. Cardiovascular: see HPI  Gastrointestinal: Negative. Genitourinary: Negative. Musculoskeletal: Negative. Skin: Negative. Neurological: Negative. Hematological: Negative. Psychiatric/Behavioral: Negative. PHYSICAL EXAM:    Vital signs:    BP (!) 154/80   Pulse 86   Ht 5' 3\" (1.6 m)   Wt 148 lb 9.6 oz (67.4 kg)   SpO2 98%   BMI 26.32 kg/m²       Constitutional and general appearance: alert, cooperative, no distress and appears stated age  HEENT: PERRL, no cervical lymphadenopathy. No masses palpable.  Normal oral mucosa  Respiratory:  · Normal excursion and expansion without use of accessory muscles  · Resp auscultation: Normal breath sounds without dullness or wheezing  Cardiovascular:  · The apical impulse is not displaced  · Heart tones are crisp and normal. Irregular S1 and S2.  · Jugular venous pulsation Normal  · The carotid upstroke is normal in amplitude and contour without delay or bruit  · Peripheral pulses are symmetrical and full   Abdomen:  · No masses or tenderness  · Bowel sounds present  Extremities:  ·  No cyanosis or clubbing  ·  Trace lower extremity edema  ·  Skin: warm and dry  Neurological:  · Alert and oriented  · Moves all extremities well  · No abnormalities of mood, affect, memory, mentation, or behavior are

## 2019-09-18 ENCOUNTER — TELEPHONE (OUTPATIENT)
Dept: FAMILY MEDICINE CLINIC | Age: 84
End: 2019-09-18

## 2019-09-18 DIAGNOSIS — R73.01 IMPAIRED FASTING BLOOD SUGAR: Primary | ICD-10-CM

## 2019-09-18 DIAGNOSIS — I10 ESSENTIAL HYPERTENSION: ICD-10-CM

## 2019-09-18 DIAGNOSIS — Z85.038 HX OF MALIGNANT NEOPLASM OF COLON: ICD-10-CM

## 2019-09-18 DIAGNOSIS — E78.2 MIXED HYPERLIPIDEMIA: ICD-10-CM

## 2019-09-18 NOTE — TELEPHONE ENCOUNTER
Patient requesting to have blood work done before her next appointment on 10/22/19. States she comes every 6 mos for DM and HTN check and gets her labs done before hand.

## 2019-10-10 DIAGNOSIS — E78.2 MIXED HYPERLIPIDEMIA: ICD-10-CM

## 2019-10-10 DIAGNOSIS — I10 ESSENTIAL HYPERTENSION: ICD-10-CM

## 2019-10-10 DIAGNOSIS — R73.01 IMPAIRED FASTING BLOOD SUGAR: ICD-10-CM

## 2019-10-10 DIAGNOSIS — Z85.038 HX OF MALIGNANT NEOPLASM OF COLON: ICD-10-CM

## 2019-10-10 LAB
A/G RATIO: 1.8 (ref 1.1–2.2)
ALBUMIN SERPL-MCNC: 4.3 G/DL (ref 3.4–5)
ALP BLD-CCNC: 91 U/L (ref 40–129)
ALT SERPL-CCNC: 22 U/L (ref 10–40)
ANION GAP SERPL CALCULATED.3IONS-SCNC: 16 MMOL/L (ref 3–16)
AST SERPL-CCNC: 22 U/L (ref 15–37)
BASOPHILS ABSOLUTE: 0 K/UL (ref 0–0.2)
BASOPHILS RELATIVE PERCENT: 1.1 %
BILIRUB SERPL-MCNC: 0.5 MG/DL (ref 0–1)
BUN BLDV-MCNC: 12 MG/DL (ref 7–20)
CALCIUM SERPL-MCNC: 9.3 MG/DL (ref 8.3–10.6)
CHLORIDE BLD-SCNC: 104 MMOL/L (ref 99–110)
CHOLESTEROL, TOTAL: 150 MG/DL (ref 0–199)
CO2: 25 MMOL/L (ref 21–32)
CREAT SERPL-MCNC: 0.8 MG/DL (ref 0.6–1.2)
EOSINOPHILS ABSOLUTE: 0 K/UL (ref 0–0.6)
EOSINOPHILS RELATIVE PERCENT: 1.1 %
GFR AFRICAN AMERICAN: >60
GFR NON-AFRICAN AMERICAN: >60
GLOBULIN: 2.4 G/DL
GLUCOSE BLD-MCNC: 134 MG/DL (ref 70–99)
HCT VFR BLD CALC: 46.3 % (ref 36–48)
HDLC SERPL-MCNC: 62 MG/DL (ref 40–60)
HEMOGLOBIN: 15.5 G/DL (ref 12–16)
LDL CHOLESTEROL CALCULATED: 71 MG/DL
LYMPHOCYTES ABSOLUTE: 1 K/UL (ref 1–5.1)
LYMPHOCYTES RELATIVE PERCENT: 24.2 %
MCH RBC QN AUTO: 33.9 PG (ref 26–34)
MCHC RBC AUTO-ENTMCNC: 33.4 G/DL (ref 31–36)
MCV RBC AUTO: 101.6 FL (ref 80–100)
MONOCYTES ABSOLUTE: 0.5 K/UL (ref 0–1.3)
MONOCYTES RELATIVE PERCENT: 11.6 %
NEUTROPHILS ABSOLUTE: 2.6 K/UL (ref 1.7–7.7)
NEUTROPHILS RELATIVE PERCENT: 62 %
PDW BLD-RTO: 14.9 % (ref 12.4–15.4)
PLATELET # BLD: 197 K/UL (ref 135–450)
PMV BLD AUTO: 9.6 FL (ref 5–10.5)
POTASSIUM SERPL-SCNC: 4 MMOL/L (ref 3.5–5.1)
RBC # BLD: 4.56 M/UL (ref 4–5.2)
SODIUM BLD-SCNC: 145 MMOL/L (ref 136–145)
TOTAL PROTEIN: 6.7 G/DL (ref 6.4–8.2)
TRIGL SERPL-MCNC: 85 MG/DL (ref 0–150)
VLDLC SERPL CALC-MCNC: 17 MG/DL
WBC # BLD: 4.2 K/UL (ref 4–11)

## 2019-10-11 LAB
ESTIMATED AVERAGE GLUCOSE: 131.2 MG/DL
HBA1C MFR BLD: 6.2 %

## 2019-10-15 ENCOUNTER — ANTI-COAG VISIT (OUTPATIENT)
Dept: PHARMACY | Age: 84
End: 2019-10-15
Payer: MEDICARE

## 2019-10-15 DIAGNOSIS — Z79.01 LONG TERM CURRENT USE OF ANTICOAGULANT THERAPY: ICD-10-CM

## 2019-10-15 LAB — INR BLD: 1.8

## 2019-10-15 PROCEDURE — 85610 PROTHROMBIN TIME: CPT | Performed by: INTERNAL MEDICINE

## 2019-10-15 PROCEDURE — 99211 OFF/OP EST MAY X REQ PHY/QHP: CPT | Performed by: INTERNAL MEDICINE

## 2019-10-22 ENCOUNTER — OFFICE VISIT (OUTPATIENT)
Dept: FAMILY MEDICINE CLINIC | Age: 84
End: 2019-10-22
Payer: MEDICARE

## 2019-10-22 VITALS
WEIGHT: 142 LBS | HEIGHT: 63 IN | SYSTOLIC BLOOD PRESSURE: 142 MMHG | HEART RATE: 76 BPM | BODY MASS INDEX: 25.16 KG/M2 | DIASTOLIC BLOOD PRESSURE: 88 MMHG | OXYGEN SATURATION: 98 %

## 2019-10-22 DIAGNOSIS — I48.21 PERMANENT ATRIAL FIBRILLATION (HCC): ICD-10-CM

## 2019-10-22 DIAGNOSIS — Z85.038 HX OF MALIGNANT NEOPLASM OF COLON: ICD-10-CM

## 2019-10-22 DIAGNOSIS — I10 ESSENTIAL HYPERTENSION: ICD-10-CM

## 2019-10-22 DIAGNOSIS — R73.02 IGT (IMPAIRED GLUCOSE TOLERANCE): Primary | ICD-10-CM

## 2019-10-22 DIAGNOSIS — E78.2 MIXED HYPERLIPIDEMIA: ICD-10-CM

## 2019-10-22 PROCEDURE — 99214 OFFICE O/P EST MOD 30 MIN: CPT | Performed by: FAMILY MEDICINE

## 2019-10-22 PROCEDURE — G8427 DOCREV CUR MEDS BY ELIG CLIN: HCPCS | Performed by: FAMILY MEDICINE

## 2019-10-22 PROCEDURE — 1123F ACP DISCUSS/DSCN MKR DOCD: CPT | Performed by: FAMILY MEDICINE

## 2019-10-22 PROCEDURE — G8484 FLU IMMUNIZE NO ADMIN: HCPCS | Performed by: FAMILY MEDICINE

## 2019-10-22 PROCEDURE — 1036F TOBACCO NON-USER: CPT | Performed by: FAMILY MEDICINE

## 2019-10-22 PROCEDURE — 4040F PNEUMOC VAC/ADMIN/RCVD: CPT | Performed by: FAMILY MEDICINE

## 2019-10-22 PROCEDURE — 1090F PRES/ABSN URINE INCON ASSESS: CPT | Performed by: FAMILY MEDICINE

## 2019-10-22 PROCEDURE — G8417 CALC BMI ABV UP PARAM F/U: HCPCS | Performed by: FAMILY MEDICINE

## 2019-10-22 RX ORDER — ATORVASTATIN CALCIUM 20 MG/1
TABLET, FILM COATED ORAL
Qty: 30 TABLET | Refills: 11 | Status: SHIPPED | OUTPATIENT
Start: 2019-10-22 | End: 2020-08-24

## 2019-10-22 RX ORDER — WARFARIN SODIUM 4 MG/1
TABLET ORAL
Qty: 30 TABLET | Refills: 5 | Status: SHIPPED | OUTPATIENT
Start: 2019-10-22 | End: 2020-05-26

## 2019-10-22 RX ORDER — DILTIAZEM HYDROCHLORIDE 240 MG/1
CAPSULE, COATED, EXTENDED RELEASE ORAL
Qty: 90 CAPSULE | Refills: 3 | Status: SHIPPED | OUTPATIENT
Start: 2019-10-22 | End: 2020-08-24

## 2019-10-22 RX ORDER — OMEPRAZOLE 40 MG/1
CAPSULE, DELAYED RELEASE ORAL
Qty: 30 CAPSULE | Refills: 11 | Status: SHIPPED | OUTPATIENT
Start: 2019-10-22 | End: 2020-07-24

## 2019-10-22 ASSESSMENT — ENCOUNTER SYMPTOMS
CONSTIPATION: 0
BLOOD IN STOOL: 0
DIARRHEA: 0
RESPIRATORY NEGATIVE: 1

## 2019-11-19 ENCOUNTER — ANTI-COAG VISIT (OUTPATIENT)
Dept: PHARMACY | Age: 84
End: 2019-11-19
Payer: MEDICARE

## 2019-11-19 DIAGNOSIS — Z79.01 LONG TERM CURRENT USE OF ANTICOAGULANT THERAPY: ICD-10-CM

## 2019-11-19 LAB — INR BLD: 1.6

## 2019-11-19 PROCEDURE — 99211 OFF/OP EST MAY X REQ PHY/QHP: CPT | Performed by: FAMILY MEDICINE

## 2019-11-19 PROCEDURE — 85610 PROTHROMBIN TIME: CPT | Performed by: FAMILY MEDICINE

## 2019-12-10 ENCOUNTER — ANTI-COAG VISIT (OUTPATIENT)
Dept: PHARMACY | Age: 84
End: 2019-12-10
Payer: MEDICARE

## 2019-12-10 DIAGNOSIS — Z79.01 LONG TERM CURRENT USE OF ANTICOAGULANT THERAPY: ICD-10-CM

## 2019-12-10 LAB — INTERNATIONAL NORMALIZATION RATIO, POC: 1.9

## 2019-12-10 PROCEDURE — 99211 OFF/OP EST MAY X REQ PHY/QHP: CPT

## 2019-12-10 PROCEDURE — 85610 PROTHROMBIN TIME: CPT

## 2019-12-18 ENCOUNTER — HOSPITAL ENCOUNTER (OUTPATIENT)
Dept: MAMMOGRAPHY | Age: 84
Discharge: HOME | End: 2019-12-18
Payer: MEDICARE

## 2019-12-18 DIAGNOSIS — Z12.31 BREAST CANCER SCREENING BY MAMMOGRAM: ICD-10-CM

## 2019-12-18 PROCEDURE — 77063 BREAST TOMOSYNTHESIS BI: CPT

## 2020-01-06 ENCOUNTER — TELEPHONE (OUTPATIENT)
Dept: FAMILY MEDICINE CLINIC | Age: 85
End: 2020-01-06

## 2020-01-06 RX ORDER — AZITHROMYCIN 250 MG/1
TABLET, FILM COATED ORAL
Qty: 1 PACKET | Refills: 0 | Status: SHIPPED | OUTPATIENT
Start: 2020-01-06 | End: 2020-07-06

## 2020-01-14 ENCOUNTER — ANTI-COAG VISIT (OUTPATIENT)
Dept: PHARMACY | Age: 85
End: 2020-01-14
Payer: MEDICARE

## 2020-01-14 LAB — INR BLD: 1.3

## 2020-01-14 PROCEDURE — 85610 PROTHROMBIN TIME: CPT

## 2020-01-14 PROCEDURE — 99211 OFF/OP EST MAY X REQ PHY/QHP: CPT

## 2020-01-14 NOTE — PROGRESS NOTES
Ms. Dusty Bullard is here for management of anticoagulation for Atrial Fibrillation. PMH also significant for HTN and HLD. She presents today w/out complaint. Pt verifies dosing regimen as listed above. Pt denies s/s bleeding/bruising/swelling/SOB. No BRBPR. No melena. Patient reports not taking any OTC/Herbals/RX. Reviewed dietary concerns. Eating more salads recently. No ETOH or tobacco use. No changes with medications. Pt started a Claudette Honey last week and was told by her Md to take 1/2 her normal Warfarin dose daily for the 5 days she was on the abx. INR 1.3 is  below therapeutic range of 2-3, d/t dose dec from being on abx. Recommend TO RESUME 4 mg daily except 6 mg on Tuesday. Patient has 4 mg tablets. Will continue to monitor and check INR in 2 weeks (normally 5 weeks). Dosing reminder card given with phone number, appointment date and time.    Return to clinic: 1/28 @ 1130 am   Referring Physician: Nadya Fernando

## 2020-01-28 ENCOUNTER — ANTI-COAG VISIT (OUTPATIENT)
Dept: PHARMACY | Age: 85
End: 2020-01-28
Payer: MEDICARE

## 2020-01-28 LAB — INR BLD: 1.9

## 2020-01-28 PROCEDURE — 99211 OFF/OP EST MAY X REQ PHY/QHP: CPT

## 2020-01-28 PROCEDURE — 85610 PROTHROMBIN TIME: CPT

## 2020-03-03 ENCOUNTER — ANTI-COAG VISIT (OUTPATIENT)
Dept: PHARMACY | Age: 85
End: 2020-03-03
Payer: MEDICARE

## 2020-03-03 LAB — INR BLD: 3.1

## 2020-03-03 PROCEDURE — 85610 PROTHROMBIN TIME: CPT

## 2020-03-03 PROCEDURE — 99211 OFF/OP EST MAY X REQ PHY/QHP: CPT

## 2020-03-03 NOTE — PROGRESS NOTES
Ms. Ruddy Sigala is here for management of anticoagulation for Atrial Fibrillation. PMH also significant for HTN and HLD. She presents today w/out complaint. Pt verifies dosing regimen as listed above. Pt denies s/s bleeding/bruising/swelling/SOB. No BRBPR. No melena. Patient reports not taking any OTC/Herbals/RX. Reviewed dietary concerns. Eating more salads recently. No ETOH or tobacco use. No changes with medications. INR 3.1 is within the therapeutic range of 2-3  Recommend take 8 mg today and then continue 4 mg daily except 6 mg on Tuesday. Patient has 4 mg tablets. Will continue to monitor and check INR in 5weeks (normally 5 weeks). Dosing reminder card given with phone number, appointment date and time.    Return to clinic: 4/7 @ 1130 am   Referring Physician: Marques Hodge

## 2020-04-07 ENCOUNTER — ANTI-COAG VISIT (OUTPATIENT)
Dept: PHARMACY | Age: 85
End: 2020-04-07
Payer: MEDICARE

## 2020-04-07 LAB — INR BLD: 2.7

## 2020-04-07 PROCEDURE — 99211 OFF/OP EST MAY X REQ PHY/QHP: CPT

## 2020-04-07 PROCEDURE — 85610 PROTHROMBIN TIME: CPT

## 2020-04-07 NOTE — PROGRESS NOTES
Ms. Shahnaz José is here for management of anticoagulation for Atrial Fibrillation. PMH also significant for HTN and HLD. She presents today w/out complaint. Pt verifies dosing regimen as listed above. Pt denies s/s bleeding/bruising/swelling/SOB. No BRBPR. No melena. Patient reports not taking any OTC/Herbals/RX. Reviewed dietary concerns. Eating more salads recently. No ETOH or tobacco use. No changes with medications. INR 2.7 is within the therapeutic range of 2-3  Recommend to continue 4 mg daily except 6 mg on Tuesday. Patient has 4 mg tablets. Will continue to monitor and check INR in 4-8 wks d/t quarantine. Dosing reminder card given with phone number, appointment date and time.    Return to clinic: 5/19  1130  Referring Physician: Casandra Malhotra

## 2020-05-19 ENCOUNTER — ANTI-COAG VISIT (OUTPATIENT)
Dept: PHARMACY | Age: 85
End: 2020-05-19
Payer: MEDICARE

## 2020-05-19 LAB — INTERNATIONAL NORMALIZATION RATIO, POC: 2.2

## 2020-05-19 PROCEDURE — 85610 PROTHROMBIN TIME: CPT

## 2020-05-19 PROCEDURE — 99211 OFF/OP EST MAY X REQ PHY/QHP: CPT

## 2020-05-26 RX ORDER — WARFARIN SODIUM 4 MG/1
TABLET ORAL
Qty: 30 TABLET | Refills: 4 | Status: SHIPPED | OUTPATIENT
Start: 2020-05-26 | End: 2020-10-22

## 2020-06-11 ENCOUNTER — TELEPHONE (OUTPATIENT)
Dept: FAMILY MEDICINE CLINIC | Age: 85
End: 2020-06-11

## 2020-06-11 NOTE — TELEPHONE ENCOUNTER
LMTCB. Patient needs to make a follow up apt. Needed to be back in 6 months from last apt. Kimberly Bradshaw

## 2020-06-22 ENCOUNTER — TELEPHONE (OUTPATIENT)
Dept: FAMILY MEDICINE CLINIC | Age: 85
End: 2020-06-22

## 2020-06-22 RX ORDER — CLOTRIMAZOLE AND BETAMETHASONE DIPROPIONATE 10; .64 MG/G; MG/G
CREAM TOPICAL
Qty: 1 TUBE | Refills: 2 | Status: SHIPPED | OUTPATIENT
Start: 2020-06-22 | End: 2021-08-11 | Stop reason: SDUPTHER

## 2020-06-30 ENCOUNTER — ANTI-COAG VISIT (OUTPATIENT)
Dept: PHARMACY | Age: 85
End: 2020-06-30
Payer: MEDICARE

## 2020-06-30 LAB — INTERNATIONAL NORMALIZATION RATIO, POC: 2.6

## 2020-06-30 PROCEDURE — 85610 PROTHROMBIN TIME: CPT

## 2020-06-30 PROCEDURE — 99211 OFF/OP EST MAY X REQ PHY/QHP: CPT

## 2020-07-06 ENCOUNTER — OFFICE VISIT (OUTPATIENT)
Dept: FAMILY MEDICINE CLINIC | Age: 85
End: 2020-07-06
Payer: MEDICARE

## 2020-07-06 VITALS
DIASTOLIC BLOOD PRESSURE: 88 MMHG | HEART RATE: 88 BPM | RESPIRATION RATE: 18 BRPM | SYSTOLIC BLOOD PRESSURE: 162 MMHG | BODY MASS INDEX: 27.1 KG/M2 | WEIGHT: 153 LBS | OXYGEN SATURATION: 98 % | TEMPERATURE: 97.4 F

## 2020-07-06 PROCEDURE — G8427 DOCREV CUR MEDS BY ELIG CLIN: HCPCS | Performed by: FAMILY MEDICINE

## 2020-07-06 PROCEDURE — 4040F PNEUMOC VAC/ADMIN/RCVD: CPT | Performed by: FAMILY MEDICINE

## 2020-07-06 PROCEDURE — 1036F TOBACCO NON-USER: CPT | Performed by: FAMILY MEDICINE

## 2020-07-06 PROCEDURE — G8417 CALC BMI ABV UP PARAM F/U: HCPCS | Performed by: FAMILY MEDICINE

## 2020-07-06 PROCEDURE — 1123F ACP DISCUSS/DSCN MKR DOCD: CPT | Performed by: FAMILY MEDICINE

## 2020-07-06 PROCEDURE — 1090F PRES/ABSN URINE INCON ASSESS: CPT | Performed by: FAMILY MEDICINE

## 2020-07-06 PROCEDURE — 99214 OFFICE O/P EST MOD 30 MIN: CPT | Performed by: FAMILY MEDICINE

## 2020-07-06 ASSESSMENT — ENCOUNTER SYMPTOMS
SORE THROAT: 0
RESPIRATORY NEGATIVE: 1
TROUBLE SWALLOWING: 0

## 2020-07-06 ASSESSMENT — PATIENT HEALTH QUESTIONNAIRE - PHQ9
1. LITTLE INTEREST OR PLEASURE IN DOING THINGS: 0
SUM OF ALL RESPONSES TO PHQ9 QUESTIONS 1 & 2: 0
SUM OF ALL RESPONSES TO PHQ QUESTIONS 1-9: 0
2. FEELING DOWN, DEPRESSED OR HOPELESS: 0
SUM OF ALL RESPONSES TO PHQ QUESTIONS 1-9: 0

## 2020-07-06 NOTE — PROGRESS NOTES
Subjective:      Patient ID: Adria Camacho is a 80 y.o. y.o. female. Here to follow BP and lipids. Hx a-fib. On Warfarin and Cartia XT for rate control. There is no interval hx of hospitalization or significant illness    Lives Independent-  Son lives with her  Pt drives locally  Independent in ADLs. HPI      Chief Complaint   Patient presents with    Hypertension     pt states she has not checked her BP    Hyperlipidemia    Other     pt also getting labs done. is fasting       Allergies   Allergen Reactions    Amoxicillin Hives     Questionable allergy      Sulfa Antibiotics Hives       Past Medical History:   Diagnosis Date    Anemia 2016    Atrial fibrillation ([address] 2006    RESOLVED, ONE INCIDENT    Cancer ([facility] [address] 2016    colon cancer    Hyperlipidemia     Hypertension     HAS BEEN RUNNING NORMAL       Past Surgical History:   Procedure Laterality Date    APPENDECTOMY      COLON SURGERY Right 6/7/16    Robotic Assisted Laparoscopic Right Colectomy    COLONOSCOPY  5/18/16    ascending colon mass, biopsy    FRACTURE SURGERY      Right ORIF ANKLE    HYSTERECTOMY      BSO    UPPER GASTROINTESTINAL ENDOSCOPY  5/18/16    biopsy gastric       Social History     Socioeconomic History    Marital status:       Spouse name: Not on file    Number of children: Not on file    Years of education: Not on file    Highest education level: Not on file   Occupational History    Not on file   Social Needs    Financial resource strain: Not on file    Food insecurity     Worry: Not on file     Inability: Not on file    Transportation needs     Medical: Not on file     Non-medical: Not on file   Tobacco Use    Smoking status: Never Smoker    Smokeless tobacco: Never Used   Substance and Sexual Activity    Alcohol use: No     Alcohol/week: 0.0 standard drinks    Drug use: No    Sexual activity: Not Currently   Lifestyle    Physical activity     Days per week: Not on file     Minutes per session: Not on file    Stress: Not on file   Relationships    Social connections     Talks on phone: Not on file     Gets together: Not on file     Attends Druze service: Not on file     Active member of club or organization: Not on file     Attends meetings of clubs or organizations: Not on file     Relationship status: Not on file    Intimate partner violence     Fear of current or ex partner: Not on file     Emotionally abused: Not on file     Physically abused: Not on file     Forced sexual activity: Not on file   Other Topics Concern    Not on file   Social History Narrative    Not on file       Family History   Problem Relation Age of Onset    Cancer Mother         breast       Vitals:    07/06/20 1040   BP: (!) 162/88   Pulse: 88   Resp: 18   Temp: 97.4 °F (36.3 °C)   SpO2: 98%       Wt Readings from Last 3 Encounters:   07/06/20 153 lb (69.4 kg)   10/22/19 142 lb (64.4 kg)   09/10/19 148 lb 9.6 oz (67.4 kg)       Review of Systems   Constitutional: Positive for unexpected weight change. Negative for activity change and appetite change. HENT: Negative for sore throat and trouble swallowing. Respiratory: Negative. Cardiovascular: Negative for chest pain and palpitations. Some dependent edema, down in AM  No orthopnea. occ feels skipped beat   Gastrointestinal:        Taking less of the Omeprazole,  Using PRN   Genitourinary: Negative for dysuria and hematuria. Neurological: Negative for seizures, facial asymmetry, speech difficulty and numbness. Occ light headed if gets up quickly. Psychiatric/Behavioral: Negative for dysphoric mood, self-injury and suicidal ideas. The patient is not nervous/anxious. Objective:   Physical Exam  Vitals signs reviewed. Constitutional:       Appearance: She is not ill-appearing. Eyes:      Extraocular Movements: Extraocular movements intact. Pupils: Pupils are equal, round, and reactive to light.    Cardiovascular:      Rate and Rhythm: Normal rate. Rhythm irregular. Pulses: Normal pulses. Heart sounds: No murmur. Pulmonary:      Effort: Pulmonary effort is normal.      Breath sounds: Normal breath sounds. Abdominal:      General: Bowel sounds are normal. There is no distension. Palpations: There is no mass. Tenderness: There is no abdominal tenderness. Musculoskeletal:      Right lower leg: No edema. Left lower leg: No edema. Skin:     General: Skin is warm and dry. Comments: Ecchymosis lower legs some   Neurological:      General: No focal deficit present. Mental Status: She is alert and oriented to person, place, and time. Psychiatric:         Mood and Affect: Mood normal.         Behavior: Behavior normal.         Assessment:      Hypertension  Atrial fibrillation, stable  Hyperlipidemia        Plan:     Fasting today. Needs labs  Looks stable. We will continue the current medication program pending the labs. She is followed in the Coumadin clinic for her warfarin dose. If the labs are fine we will follow her in 6 months. Current Outpatient Medications   Medication Sig Dispense Refill    clotrimazole-betamethasone (LOTRISONE) 1-0.05 % cream APPLY TWO TIMES A DAY 1 Tube 2    warfarin (COUMADIN) 4 MG tablet TAKE ONE TABLET BY MOUTH DAILY 30 tablet 4    diltiazem (CARTIA XT) 240 MG extended release capsule TAKE ONE CAPSULE BY MOUTH DAILY 90 capsule 3    atorvastatin (LIPITOR) 20 MG tablet TAKE ONE TABLET BY MOUTH DAILY 30 tablet 11    omeprazole (PRILOSEC) 40 MG delayed release capsule TAKE ONE CAPSULE BY MOUTH DAILY 30 capsule 11    Loratadine (CLARITIN PO) Take by mouth Indications: prn allergies       No current facility-administered medications for this visit.

## 2020-07-07 LAB
A/G RATIO: 1.6 (ref 1.1–2.2)
ALBUMIN SERPL-MCNC: 4.4 G/DL (ref 3.4–5)
ALP BLD-CCNC: 89 U/L (ref 40–129)
ALT SERPL-CCNC: 24 U/L (ref 10–40)
ANION GAP SERPL CALCULATED.3IONS-SCNC: 19 MMOL/L (ref 3–16)
AST SERPL-CCNC: 23 U/L (ref 15–37)
BASOPHILS ABSOLUTE: 0 K/UL (ref 0–0.2)
BASOPHILS RELATIVE PERCENT: 0.8 %
BILIRUB SERPL-MCNC: 0.7 MG/DL (ref 0–1)
BUN BLDV-MCNC: 16 MG/DL (ref 7–20)
CALCIUM SERPL-MCNC: 9.7 MG/DL (ref 8.3–10.6)
CHLORIDE BLD-SCNC: 107 MMOL/L (ref 99–110)
CHOLESTEROL, TOTAL: 180 MG/DL (ref 0–199)
CO2: 22 MMOL/L (ref 21–32)
CREAT SERPL-MCNC: 1 MG/DL (ref 0.6–1.2)
EOSINOPHILS ABSOLUTE: 0 K/UL (ref 0–0.6)
EOSINOPHILS RELATIVE PERCENT: 0.9 %
ESTIMATED AVERAGE GLUCOSE: 137 MG/DL
GFR AFRICAN AMERICAN: >60
GFR NON-AFRICAN AMERICAN: 52
GLOBULIN: 2.8 G/DL
GLUCOSE BLD-MCNC: 121 MG/DL (ref 70–99)
HBA1C MFR BLD: 6.4 %
HCT VFR BLD CALC: 46.9 % (ref 36–48)
HDLC SERPL-MCNC: 60 MG/DL (ref 40–60)
HEMOGLOBIN: 15.6 G/DL (ref 12–16)
LDL CHOLESTEROL CALCULATED: 99 MG/DL
LYMPHOCYTES ABSOLUTE: 1.2 K/UL (ref 1–5.1)
LYMPHOCYTES RELATIVE PERCENT: 24.4 %
MCH RBC QN AUTO: 34 PG (ref 26–34)
MCHC RBC AUTO-ENTMCNC: 33.2 G/DL (ref 31–36)
MCV RBC AUTO: 102.4 FL (ref 80–100)
MONOCYTES ABSOLUTE: 0.6 K/UL (ref 0–1.3)
MONOCYTES RELATIVE PERCENT: 11.8 %
NEUTROPHILS ABSOLUTE: 2.9 K/UL (ref 1.7–7.7)
NEUTROPHILS RELATIVE PERCENT: 62.1 %
PDW BLD-RTO: 14.1 % (ref 12.4–15.4)
PLATELET # BLD: 178 K/UL (ref 135–450)
PMV BLD AUTO: 10.1 FL (ref 5–10.5)
POTASSIUM SERPL-SCNC: 4.2 MMOL/L (ref 3.5–5.1)
RBC # BLD: 4.58 M/UL (ref 4–5.2)
SODIUM BLD-SCNC: 148 MMOL/L (ref 136–145)
TOTAL PROTEIN: 7.2 G/DL (ref 6.4–8.2)
TRIGL SERPL-MCNC: 104 MG/DL (ref 0–150)
VITAMIN D 25-HYDROXY: 53.7 NG/ML
VLDLC SERPL CALC-MCNC: 21 MG/DL
WBC # BLD: 4.7 K/UL (ref 4–11)

## 2020-07-28 ENCOUNTER — ANTI-COAG VISIT (OUTPATIENT)
Dept: PHARMACY | Age: 85
End: 2020-07-28
Payer: MEDICARE

## 2020-07-28 LAB — INR BLD: 2.4

## 2020-07-28 PROCEDURE — 85610 PROTHROMBIN TIME: CPT

## 2020-07-28 PROCEDURE — 99211 OFF/OP EST MAY X REQ PHY/QHP: CPT

## 2020-07-28 NOTE — PROGRESS NOTES
Ms. Anabel Boles is here for management of anticoagulation for Atrial Fibrillation. PMH also significant for HTN and HLD. She presents today w/out complaint. Pt verifies dosing regimen as listed above. Pt denies s/s bleeding/bruising/swelling/SOB. No BRBPR. No melena. Patient reports not taking any OTC/Herbals/RX. Reviewed dietary concerns. No ETOH or tobacco use. No changes with medications. INR 2.4 is within the therapeutic range of 2-3  Recommend to continue 4 mg daily except 6 mg on Tuesday. Patient has 4 mg tablets. Will continue to monitor and check INR in 4 wks    Dosing reminder card given with phone number, appointment date and time.    Return to clinic: 8/25 at 1130  Referring Physician: Shmuel Levy PharmD  7/28/2020 at 11:30 AM

## 2020-07-31 ENCOUNTER — TELEPHONE (OUTPATIENT)
Dept: CARDIOLOGY CLINIC | Age: 85
End: 2020-07-31

## 2020-08-25 ENCOUNTER — ANTI-COAG VISIT (OUTPATIENT)
Dept: PHARMACY | Age: 85
End: 2020-08-25
Payer: MEDICARE

## 2020-08-25 LAB — INR BLD: 3.6

## 2020-08-25 PROCEDURE — 99211 OFF/OP EST MAY X REQ PHY/QHP: CPT

## 2020-08-25 PROCEDURE — 85610 PROTHROMBIN TIME: CPT

## 2020-09-22 ENCOUNTER — ANTI-COAG VISIT (OUTPATIENT)
Dept: PHARMACY | Age: 85
End: 2020-09-22
Payer: MEDICARE

## 2020-09-22 LAB — INR BLD: 3.1

## 2020-09-22 PROCEDURE — 85610 PROTHROMBIN TIME: CPT

## 2020-09-22 PROCEDURE — 99211 OFF/OP EST MAY X REQ PHY/QHP: CPT

## 2020-09-22 NOTE — PROGRESS NOTES
Ms. Kristina Vargas is here for management of anticoagulation for Atrial Fibrillation. PMH also significant for HTN and HLD. She presents today w/out complaint. Pt verifies dosing regimen as listed above. Pt denies s/s bleeding/bruising/swelling/SOB. No BRBPR. No melena. Patient reports not taking any OTC/Herbals/RX. Reviewed dietary concerns. No ETOH or tobacco use. No changes with medications. She states that she has been eating more greens to help lower her INR but she would prefer to not have to do that going further. Since this is her second INR >3.0 in a row, I will go ahead and reduce her dose for now. INR 3.1 is slightly above the therapeutic range of 2-3. Recommend to reduce dose to 4 mg daily. Patient has 4 mg tablets. Will continue to monitor and check INR in 4 wks    Dosing reminder card given with phone number, appointment date and time.    Return to clinic: 10/20 at 1130  Referring Physician: SILKE Giraldo, PharmD 9/22/20  11:33 AM

## 2020-09-25 NOTE — PROGRESS NOTES
[facility]   Cardiac Consultation    Primary Care Physician: Belinda Betancur DO     Chief Complaint:   Chief Complaint   Patient presents with    1 Year Follow Up    Atrial Fibrillation       HPI:  Sandrine Cardona is a 80 y.o. female who presents today regarding permanent atrial fibrillation. She has a past medical history of HTN, HLD, Colon Cancer, and permanent AF. She was originally admitted in 6/2016 for right colectomy. While hospitalized she was found to have AF with RVR. She was started on coumadin, lopressor and diltiazem upon d/c. At following office visits in 7/2016 and 8/2016 she was noted to be in sinus bradycardia and her lopressor and diltiazem were decreased. She was noted to be in asymptomatic AF at subsequent office visits. Her metoprolol was stopped in 8/2017 d/t fatigue and dizziness. At her most recent office visit in 9/2019 she was noted to remain in AF with HR 86 BPM. [facility]. Today 9/28/20 she reports she is feeling well from a cardiac standpoint. She reports she is taking her medications as prescribed and tolerating them well. She denies any abnormal bleeding or bruising. She reports she occasionally feels some racing heart beats at night or when sleeping. She reports she takes her coumadin at night and her diltiazem in the AM. She reports she is active, doing yard work around her home, and tolerates that well. Patient denies current edema,  chest pain, sob, palpitations, dizziness or syncope. Past Medical History:   has a past medical history of Anemia, Atrial fibrillation ([address] Cancer ([facility] [address] Hyperlipidemia, and Hypertension. Surgical History:   has a past surgical history that includes Appendectomy; Hysterectomy; Colonoscopy (5/18/16); Upper gastrointestinal endoscopy (5/18/16); fracture surgery; and Colon surgery (Right, 6/7/16). Social History:   reports that she has never smoked.  She has never used smokeless tobacco. She reports that she does not drink alcohol or use drugs. Family History:  family history includes Cancer in her mother. Home Medications:  Outpatient Encounter Medications as of 9/28/2020   Medication Sig Dispense Refill    Cholecalciferol (VITAMIN D) 50 MCG (2000 UT) CAPS capsule Take 1 capsule by mouth daily      atorvastatin (LIPITOR) 20 MG tablet TAKE ONE TABLET BY MOUTH DAILY 90 tablet 2    dilTIAZem (CARTIA XT) 240 MG extended release capsule TAKE ONE CAPSULE BY MOUTH DAILY 90 capsule 2    omeprazole (PRILOSEC) 40 MG delayed release capsule TAKE ONE CAPSULE BY MOUTH DAILY 90 capsule 3    warfarin (COUMADIN) 4 MG tablet TAKE ONE TABLET BY MOUTH DAILY 30 tablet 4    clotrimazole-betamethasone (LOTRISONE) 1-0.05 % cream APPLY TWO TIMES A DAY (Patient not taking: Reported on 9/28/2020) 1 Tube 2    Loratadine (CLARITIN PO) Take by mouth Indications: prn allergies       No facility-administered encounter medications on file as of 9/28/2020. Allergies:  Amoxicillin and Sulfa antibiotics     Review of Systems   Constitutional: Negative. HENT: Negative. Eyes: Negative. Respiratory: Negative. Cardiovascular: Negative. Gastrointestinal: Negative. Genitourinary: Negative. Musculoskeletal: Negative. Skin: Negative. Neurological: Negative. Hematological: Negative. Psychiatric/Behavioral: Negative. /80   Pulse 79   Ht 5' 4\" (1.626 m)   Wt 158 lb (71.7 kg)   SpO2 97%   BMI 27.12 kg/m²     Objective:  Physical Exam   Constitutional: She is oriented to person, place, and time. She appears well-developed and well-nourished. HENT:   Head: Normocephalic and atraumatic. Eyes: Pupils are equal, round, and reactive to light. Neck: Normal range of motion. Cardiovascular: Normal rate, irregular rhythm and irregular heart sounds. Pulmonary/Chest: Effort normal and breath sounds normal.   Abdominal: Soft. No tenderness.    Musculoskeletal: Normal

## 2020-09-28 ENCOUNTER — TELEPHONE (OUTPATIENT)
Dept: CARDIOLOGY CLINIC | Age: 85
End: 2020-09-28

## 2020-09-28 ENCOUNTER — OFFICE VISIT (OUTPATIENT)
Dept: CARDIOLOGY CLINIC | Age: 85
End: 2020-09-28
Payer: MEDICARE

## 2020-09-28 VITALS
BODY MASS INDEX: 26.98 KG/M2 | SYSTOLIC BLOOD PRESSURE: 120 MMHG | HEIGHT: 64 IN | DIASTOLIC BLOOD PRESSURE: 80 MMHG | OXYGEN SATURATION: 97 % | WEIGHT: 158 LBS | HEART RATE: 79 BPM

## 2020-09-28 PROCEDURE — G8417 CALC BMI ABV UP PARAM F/U: HCPCS | Performed by: INTERNAL MEDICINE

## 2020-09-28 PROCEDURE — 93000 ELECTROCARDIOGRAM COMPLETE: CPT | Performed by: INTERNAL MEDICINE

## 2020-09-28 PROCEDURE — G8427 DOCREV CUR MEDS BY ELIG CLIN: HCPCS | Performed by: INTERNAL MEDICINE

## 2020-09-28 PROCEDURE — 1090F PRES/ABSN URINE INCON ASSESS: CPT | Performed by: INTERNAL MEDICINE

## 2020-09-28 PROCEDURE — 4040F PNEUMOC VAC/ADMIN/RCVD: CPT | Performed by: INTERNAL MEDICINE

## 2020-09-28 PROCEDURE — 1123F ACP DISCUSS/DSCN MKR DOCD: CPT | Performed by: INTERNAL MEDICINE

## 2020-09-28 PROCEDURE — 1036F TOBACCO NON-USER: CPT | Performed by: INTERNAL MEDICINE

## 2020-09-28 PROCEDURE — 99214 OFFICE O/P EST MOD 30 MIN: CPT | Performed by: INTERNAL MEDICINE

## 2020-09-28 RX ORDER — MULTIVIT-MIN/IRON/FOLIC ACID/K 18-600-40
1 CAPSULE ORAL DAILY
COMMUNITY
End: 2021-08-11 | Stop reason: SDUPTHER

## 2020-09-28 NOTE — PATIENT INSTRUCTIONS
Plan:  1. 24 HR Cardiac Event Monitor for AF   2. Continue current medications as prescribed  3.  Follow up with EP NP in 1 year

## 2020-09-28 NOTE — TELEPHONE ENCOUNTER
Monitor placed by Elayne of monitor 24 hour  Monitor ordered by [employer]  Serial number   Kit ID   Activation successful prior to pt leaving office?  Yes

## 2020-09-28 NOTE — TELEPHONE ENCOUNTER
Monitor placed by [address] [place] [address]  Length of monitor 24 HR  Monitor ordered by [facility]  Serial number [id number]  Kit ID [id number]  Activation successful prior to pt leaving office?  Yes

## 2020-09-28 NOTE — LETTER
[facility]   Cardiac Consultation    Primary Care Physician: Rina Fang DO     Chief Complaint:   Chief Complaint   Patient presents with    1 Year Follow Up    Atrial Fibrillation       HPI:  Amy Bradley is a 80 y.o. female who presents today regarding permanent atrial fibrillation. She has a past medical history of HTN, HLD, Colon Cancer, and permanent AF. She was originally admitted in 6/2016 for right colectomy. While hospitalized she was found to have AF with RVR. She was started on coumadin, lopressor and diltiazem upon d/c. At following office visits in 7/2016 and 8/2016 she was noted to be in sinus bradycardia and her lopressor and diltiazem were decreased. She was noted to be in asymptomatic AF at subsequent office visits. Her metoprolol was stopped in 8/2017 d/t fatigue and dizziness. At her most recent office visit in 9/2019 she was noted to remain in AF with HR 86 BPM. Her [facility]mad[facility]. Today 9/28/20 she reports she is feeling well from a cardiac standpoint. She reports she is taking her medications as prescribed and tolerating them well. She denies any abnormal bleeding or bruising. She reports she occasionally feels some racing heart beats at night or when sleeping. She reports she takes her coumadin at night and her diltiazem in the AM. She reports she is active, doing yard work around her home, and tolerates that well. Patient denies current edema,  chest pain, sob, palpitations, dizziness or syncope. Past Medical History:   has a past medical history of Anemia, Atrial fibrillation ([address] Cancer ([facility] [address] Hyperlipidemia, and Hypertension. Surgical History:   has a past surgical history that includes Appendectomy; Hysterectomy; Colonoscopy (5/18/16); Upper gastrointestinal endoscopy (5/18/16); fracture surgery; and Colon surgery (Right, 6/7/16).      Social History: reports that she has never smoked. She has never used smokeless tobacco. She reports that she does not drink alcohol or use drugs. Family History:  family history includes Cancer in her mother. Home Medications:  Outpatient Encounter Medications as of 9/28/2020   Medication Sig Dispense Refill    Cholecalciferol (VITAMIN D) 50 MCG (2000 UT) CAPS capsule Take 1 capsule by mouth daily      atorvastatin (LIPITOR) 20 MG tablet TAKE ONE TABLET BY MOUTH DAILY 90 tablet 2    dilTIAZem (CARTIA XT) 240 MG extended release capsule TAKE ONE CAPSULE BY MOUTH DAILY 90 capsule 2    omeprazole (PRILOSEC) 40 MG delayed release capsule TAKE ONE CAPSULE BY MOUTH DAILY 90 capsule 3    warfarin (COUMADIN) 4 MG tablet TAKE ONE TABLET BY MOUTH DAILY 30 tablet 4    clotrimazole-betamethasone (LOTRISONE) 1-0.05 % cream APPLY TWO TIMES A DAY (Patient not taking: Reported on 9/28/2020) 1 Tube 2    Loratadine (CLARITIN PO) Take by mouth Indications: prn allergies       No facility-administered encounter medications on file as of 9/28/2020. Allergies:  Amoxicillin and Sulfa antibiotics     Review of Systems   Constitutional: Negative. HENT: Negative. Eyes: Negative. Respiratory: Negative. Cardiovascular: Negative. Gastrointestinal: Negative. Genitourinary: Negative. Musculoskeletal: Negative. Skin: Negative. Neurological: Negative. Hematological: Negative. Psychiatric/Behavioral: Negative. /80   Pulse 79   Ht 5' 4\" (1.626 m)   Wt 158 lb (71.7 kg)   SpO2 97%   BMI 27.12 kg/m²     Objective:  Physical Exam   Constitutional: She is oriented to person, place, and time. She appears well-developed and well-nourished. HENT:   Head: Normocephalic and atraumatic. Eyes: Pupils are equal, round, and reactive to light. Neck: Normal range of motion. Cardiovascular: Normal rate, irregular rhythm and irregular heart sounds. Pulmonary/Chest: Effort normal and breath sounds normal.   Abdominal: Soft. No tenderness. Musculoskeletal: Normal range of motion. She exhibits no edema. Neurological: She is alert and oriented to person, place, and time. Skin: Skin is warm and dry. Psychiatric: She has a normal mood and affect. Assessment:  1. Permanent Atrial Fibrillation - HR control with diltiazem, CVA protection with warfarin. Plan:  1. 24 HR Cardiac Event Monitor for AF   2. Continue current medications as prescribed  3. Follow up with EP NP in 1 year       QUALITY MEASURES  1. Tobacco Cessation Counseling: NA  2. Retake of BP if >140/90:   NA  3. Documentation to PCP/referring for new patient:  Sent to PCP at close of office visit  4. CAD patient on anti-platelet: NA  5. CAD patient on STATIN therapy:  Yes  6. Patient with CHF and aFib on anticoagulation: YES, Coumadin    This note was scribed in the presence of Komal Pérez MD by Wally Diggs. Tc Wallace, Dr. Komal Pérez, personally performed the services described in this documentation as scribed by Wally Diggs. Honey Ellington RN in my presence, and it is both accurate and complete.       Komal Pérez M.D.

## 2020-10-03 PROCEDURE — 93224 XTRNL ECG REC UP TO 48 HRS: CPT | Performed by: INTERNAL MEDICINE

## 2020-10-20 ENCOUNTER — ANTI-COAG VISIT (OUTPATIENT)
Dept: PHARMACY | Age: 85
End: 2020-10-20
Payer: MEDICARE

## 2020-10-20 LAB — INR BLD: 3

## 2020-10-20 PROCEDURE — 99211 OFF/OP EST MAY X REQ PHY/QHP: CPT | Performed by: FAMILY MEDICINE

## 2020-10-20 PROCEDURE — 85610 PROTHROMBIN TIME: CPT | Performed by: FAMILY MEDICINE

## 2020-10-20 NOTE — PROGRESS NOTES
Ms. Maylin Moses is here for management of anticoagulation for Atrial Fibrillation. PMH also significant for HTN and HLD. She presents today w/out complaint. Pt verifies dosing regimen as listed above. Pt denies s/s bleeding/bruising/swelling/SOB. No BRBPR. No melena. Patient reports not taking any OTC/Herbals/RX. Reviewed dietary concerns. No ETOH or tobacco use. No changes with medications. States she is eating less greens than normal     INR 3.0 is within the therapeutic range of 2-3. Recommend to continue 4 mg daily. Patient has 4 mg tablets. Will continue to monitor and check INR in 4 wks    Dosing reminder card given with phone number, appointment date and time.    Return to clinic:11/17 @ 7322   Referring Physician: SILKE Mantilla, PharmD 10/20/2020 11:27 AM

## 2020-10-22 RX ORDER — WARFARIN SODIUM 4 MG/1
TABLET ORAL
Qty: 30 TABLET | Refills: 3 | Status: SHIPPED | OUTPATIENT
Start: 2020-10-22 | End: 2021-02-15

## 2020-10-22 NOTE — TELEPHONE ENCOUNTER
Future Appointments   Date Time Provider Tracey Lambert   11/17/2020 11:30 AM [address]     7/6/2020 LOV

## 2020-10-23 ENCOUNTER — TELEPHONE (OUTPATIENT)
Dept: CARDIOLOGY CLINIC | Age: 85
End: 2020-10-23

## 2020-10-23 NOTE — LETTER
[facility] - [address] [facility] [address] [facility]  Phone: [phone]  Fax: [phone]    Ortiz Clemens MD        November 3, 2020    Ksenia Gaines[address] [address]      Dear Shanti Phoenix:    We have been attempting to call you regarding your cardiac monitor results. Please call us for your results.          Sincerely,        Ortiz Clemens MD

## 2020-10-23 NOTE — TELEPHONE ENCOUNTER
24 cardiac event monitor worn on 9/28/2020 now scanned in to media. Please advise on your recommendations for the patient.

## 2020-11-17 ENCOUNTER — ANTI-COAG VISIT (OUTPATIENT)
Dept: PHARMACY | Age: 85
End: 2020-11-17
Payer: MEDICARE

## 2020-11-17 LAB — INR BLD: 4.4

## 2020-11-17 PROCEDURE — 85610 PROTHROMBIN TIME: CPT

## 2020-11-17 PROCEDURE — 99211 OFF/OP EST MAY X REQ PHY/QHP: CPT

## 2020-11-30 ENCOUNTER — ANTI-COAG VISIT (OUTPATIENT)
Dept: PHARMACY | Age: 85
End: 2020-11-30
Payer: MEDICARE

## 2020-11-30 LAB — INTERNATIONAL NORMALIZATION RATIO, POC: 3.6

## 2020-11-30 PROCEDURE — 99211 OFF/OP EST MAY X REQ PHY/QHP: CPT | Performed by: INTERNAL MEDICINE

## 2020-11-30 PROCEDURE — 85610 PROTHROMBIN TIME: CPT | Performed by: INTERNAL MEDICINE

## 2020-12-15 ENCOUNTER — ANTI-COAG VISIT (OUTPATIENT)
Dept: PHARMACY | Age: 85
End: 2020-12-15
Payer: MEDICARE

## 2020-12-15 LAB — INR BLD: 2.6

## 2020-12-15 PROCEDURE — 99211 OFF/OP EST MAY X REQ PHY/QHP: CPT

## 2020-12-15 PROCEDURE — 85610 PROTHROMBIN TIME: CPT

## 2020-12-15 NOTE — PROGRESS NOTES
Ms. Kaila Mena is here for management of anticoagulation for Atrial Fibrillation. PMH also significant for HTN and HLD. She presents today w/out complaint. Pt verifies dosing regimen as listed above. Pt denies s/s bleeding/bruising/swelling/SOB. No BRBPR. No melena. Patient reports not taking any OTC/Herbals/RX. Reviewed dietary concerns. No ETOH or tobacco use. No changes with medications. INR 2.6 is within the therapeutic range of 2-3. Recommend to continue current dose of 4 mg daily except for 2 mg on Mon/Fri. Patient has 4 mg tablets. Will continue to monitor and check INR in 4 weeks. Dosing reminder card given with phone number, appointment date and time.    Return to clinic: 1/12 @ [phone]  Referring Physician: Caesar Maker, NP Cherylene Coats  PharmD Candidate

## 2021-01-12 ENCOUNTER — ANTI-COAG VISIT (OUTPATIENT)
Dept: PHARMACY | Age: 86
End: 2021-01-12
Payer: MEDICARE

## 2021-01-12 DIAGNOSIS — Z79.01 LONG TERM CURRENT USE OF ANTICOAGULANT THERAPY: ICD-10-CM

## 2021-01-12 LAB — INR BLD: 2.2

## 2021-01-12 PROCEDURE — 99211 OFF/OP EST MAY X REQ PHY/QHP: CPT | Performed by: FAMILY MEDICINE

## 2021-01-12 PROCEDURE — 85610 PROTHROMBIN TIME: CPT | Performed by: FAMILY MEDICINE

## 2021-01-12 NOTE — PROGRESS NOTES
Ms. Constanza Briseno is here for management of anticoagulation for Atrial Fibrillation. PMH also significant for HTN and HLD. She presents today w/out complaint. Pt verifies dosing regimen as listed above. Pt denies s/s bleeding/bruising/swelling/SOB. No BRBPR. No melena. Patient reports not taking any OTC/Herbals/RX. Reviewed dietary concerns. No ETOH or tobacco use. No changes with medications. INR 2.2 is within the therapeutic range of 2-3. Recommend to continue current dose of 4 mg daily except for 2 mg on Mon/Fri. Patient has 4 mg tablets. Will continue to monitor and check INR in 4 weeks. Dosing reminder card given with phone number, appointment date and time.    Return to clinic: 2/9 @ 9906  Referring Physician: SILKE Sharma, PharmD 1/12/2021 11:15 AM

## 2021-01-22 ENCOUNTER — TELEPHONE (OUTPATIENT)
Dept: FAMILY MEDICINE CLINIC | Age: 86
End: 2021-01-22

## 2021-01-22 NOTE — TELEPHONE ENCOUNTER
Pt states she has been seeing adds on TV stating if on blood thinners you shouldn't get covid vaccine pt just wants to make sure its ok to get still

## 2021-02-12 ENCOUNTER — ANTI-COAG VISIT (OUTPATIENT)
Dept: PHARMACY | Age: 86
End: 2021-02-12
Payer: MEDICARE

## 2021-02-12 DIAGNOSIS — Z79.01 LONG TERM CURRENT USE OF ANTICOAGULANT THERAPY: ICD-10-CM

## 2021-02-12 DIAGNOSIS — I48.21 PERMANENT ATRIAL FIBRILLATION (HCC): ICD-10-CM

## 2021-02-12 LAB
INR BLD: 1.5
INR BLD: 1.7

## 2021-02-12 PROCEDURE — 85610 PROTHROMBIN TIME: CPT

## 2021-02-12 PROCEDURE — 99211 OFF/OP EST MAY X REQ PHY/QHP: CPT

## 2021-02-15 DIAGNOSIS — I48.21 PERMANENT ATRIAL FIBRILLATION (HCC): ICD-10-CM

## 2021-02-15 RX ORDER — WARFARIN SODIUM 4 MG/1
TABLET ORAL
Qty: 90 TABLET | Refills: 2 | Status: SHIPPED | OUTPATIENT
Start: 2021-02-15 | End: 2021-08-11 | Stop reason: SDUPTHER

## 2021-03-09 ENCOUNTER — ANTI-COAG VISIT (OUTPATIENT)
Dept: PHARMACY | Age: 86
End: 2021-03-09
Payer: MEDICARE

## 2021-03-09 DIAGNOSIS — Z79.01 LONG TERM CURRENT USE OF ANTICOAGULANT THERAPY: ICD-10-CM

## 2021-03-09 DIAGNOSIS — I48.21 PERMANENT ATRIAL FIBRILLATION (HCC): ICD-10-CM

## 2021-03-09 LAB — INR BLD: 2.6

## 2021-03-09 PROCEDURE — 99211 OFF/OP EST MAY X REQ PHY/QHP: CPT | Performed by: FAMILY MEDICINE

## 2021-03-09 PROCEDURE — 85610 PROTHROMBIN TIME: CPT | Performed by: FAMILY MEDICINE

## 2021-04-06 ENCOUNTER — ANTI-COAG VISIT (OUTPATIENT)
Dept: PHARMACY | Age: 86
End: 2021-04-06
Payer: MEDICARE

## 2021-04-06 DIAGNOSIS — I48.21 PERMANENT ATRIAL FIBRILLATION (HCC): ICD-10-CM

## 2021-04-06 DIAGNOSIS — Z79.01 LONG TERM CURRENT USE OF ANTICOAGULANT THERAPY: ICD-10-CM

## 2021-04-06 LAB — INTERNATIONAL NORMALIZATION RATIO, POC: 2.6

## 2021-04-06 PROCEDURE — 99211 OFF/OP EST MAY X REQ PHY/QHP: CPT

## 2021-04-06 PROCEDURE — 85610 PROTHROMBIN TIME: CPT

## 2021-04-06 NOTE — PROGRESS NOTES
Ms. Liam Liao is here for management of anticoagulation for Atrial Fibrillation. PMH also significant for HTN and HLD. She presents today w/out complaint. Pt verifies dosing regimen as listed above. Pt denies s/s bleeding/bruising/swelling/SOB. Patient reports not taking any OTC/Herbals/RX. Reviewed dietary concerns. No ETOH or tobacco use. No changes with medications. Pt reports no changes     INR 2.6 is within the therapeutic range of 2-3. Recommend to continue dose of 4 mg daily except for 2 mg on Mon/Fri. Patient has 4 mg tablets. Will continue to monitor and check INR in 4 weeks. Dosing reminder card given with phone number, appointment date and time.    Return to clinic: 5/4 @ 1130 am   Referring Physician: SILKE Perry PharmD  4/6/2021 at 11:27 AM

## 2021-05-04 ENCOUNTER — ANTI-COAG VISIT (OUTPATIENT)
Dept: PHARMACY | Age: 86
End: 2021-05-04
Payer: MEDICARE

## 2021-05-04 LAB — INR BLD: 2.5

## 2021-05-04 PROCEDURE — 99211 OFF/OP EST MAY X REQ PHY/QHP: CPT

## 2021-05-04 PROCEDURE — 85610 PROTHROMBIN TIME: CPT

## 2021-05-04 NOTE — PROGRESS NOTES
Ms. Kavita Luna is here for management of anticoagulation for Atrial Fibrillation. PMH also significant for HTN and HLD. She presents today w/out complaint. Pt verifies dosing regimen as listed above. Pt denies s/s bleeding/bruising/swelling/SOB. Patient reports not taking any OTC/Herbals/RX. Reviewed dietary concerns. No ETOH or tobacco use. No changes with medications. INR 2.5 is within the therapeutic range of 2-3. Recommend to continue dose of 4 mg daily except for 2 mg on Mon/Fri. Patient has 4 mg tablets. Will continue to monitor and check INR in 4 weeks. Dosing reminder card given with phone number, appointment date and time.    Return to clinic: 5/4 @ 1130 am   Referring Physician: SILKE Keller PharmD  5/4/2021 at 11:25 AM

## 2021-05-17 RX ORDER — DILTIAZEM HYDROCHLORIDE 240 MG/1
CAPSULE, COATED, EXTENDED RELEASE ORAL
Qty: 90 CAPSULE | Refills: 1 | Status: SHIPPED | OUTPATIENT
Start: 2021-05-17 | End: 2021-08-11 | Stop reason: SDUPTHER

## 2021-05-17 RX ORDER — ATORVASTATIN CALCIUM 20 MG/1
TABLET, FILM COATED ORAL
Qty: 90 TABLET | Refills: 1 | Status: SHIPPED | OUTPATIENT
Start: 2021-05-17 | End: 2021-08-11 | Stop reason: SDUPTHER

## 2021-06-01 ENCOUNTER — ANTI-COAG VISIT (OUTPATIENT)
Dept: PHARMACY | Age: 86
End: 2021-06-01
Payer: MEDICARE

## 2021-06-01 DIAGNOSIS — Z79.01 LONG TERM CURRENT USE OF ANTICOAGULANT THERAPY: Primary | ICD-10-CM

## 2021-06-01 DIAGNOSIS — I48.21 PERMANENT ATRIAL FIBRILLATION (HCC): ICD-10-CM

## 2021-06-01 LAB — INR BLD: 2.9

## 2021-06-01 PROCEDURE — 85610 PROTHROMBIN TIME: CPT

## 2021-06-01 PROCEDURE — 99211 OFF/OP EST MAY X REQ PHY/QHP: CPT

## 2021-06-01 NOTE — PROGRESS NOTES
Ms. Sheela Wahl is here for management of anticoagulation for Atrial Fibrillation. PMH also significant for HTN and HLD. She presents today w/out complaint. Pt verifies dosing regimen as listed above. Pt denies s/sx bleeding/bruising/swelling/SOB. Patient reports not taking any OTC/Herbals/RX. Reviewed dietary concerns. No ETOH or tobacco use. No changes with medications. INR 2.9 is within the therapeutic range of 2-3. Recommend to continue dose of 4 mg daily except for 2 mg on Mon/Fri. Patient has 4 mg tablets. Will continue to monitor and check INR in 4 weeks. Dosing reminder card given with phone number, appointment date and time.    Return to clinic: 7/6  @ [phone]  Referring Physician: SILKE Morrison, [address] [place] [address] PharmD  6/1/2021 at 11:24 AM

## 2021-07-06 ENCOUNTER — ANTI-COAG VISIT (OUTPATIENT)
Dept: PHARMACY | Age: 86
End: 2021-07-06
Payer: MEDICARE

## 2021-07-06 DIAGNOSIS — I48.21 PERMANENT ATRIAL FIBRILLATION (HCC): ICD-10-CM

## 2021-07-06 DIAGNOSIS — Z79.01 LONG TERM CURRENT USE OF ANTICOAGULANT THERAPY: Primary | ICD-10-CM

## 2021-07-06 LAB — INR BLD: 2.2

## 2021-07-06 PROCEDURE — 99211 OFF/OP EST MAY X REQ PHY/QHP: CPT

## 2021-07-06 PROCEDURE — 85610 PROTHROMBIN TIME: CPT

## 2021-07-06 NOTE — PROGRESS NOTES
Ms. Bruna Kemp is here for management of anticoagulation for Atrial Fibrillation. PMH also significant for HTN and HLD. She presents today w/out complaint. Pt verifies dosing regimen as listed above. Pt denies s/sx bleeding/bruising/swelling/SOB. Patient reports not taking any OTC/Herbals/RX. Reviewed dietary concerns. No ETOH or tobacco use. No changes with medications. INR 2.2  is within the therapeutic range of 2-3. Recommend to continue dose of 4 mg daily except for 2 mg on Mon/Fri. Patient has 4 mg tablets. Will continue to monitor and check INR in 4 weeks. Dosing reminder card given with phone number, appointment date and time.    Return to clinic: 8/3  @ 1130am  Referring Physician: SILKE SarmientoD Candidate

## 2021-07-28 ENCOUNTER — TELEPHONE (OUTPATIENT)
Dept: FAMILY MEDICINE CLINIC | Age: 86
End: 2021-07-28

## 2021-07-28 DIAGNOSIS — E78.2 MIXED HYPERLIPIDEMIA: Primary | ICD-10-CM

## 2021-07-28 DIAGNOSIS — I10 ESSENTIAL HYPERTENSION: ICD-10-CM

## 2021-07-28 DIAGNOSIS — E55.9 VITAMIN D DEFICIENCY: ICD-10-CM

## 2021-07-28 DIAGNOSIS — R73.02 IGT (IMPAIRED GLUCOSE TOLERANCE): ICD-10-CM

## 2021-08-03 ENCOUNTER — ANTI-COAG VISIT (OUTPATIENT)
Dept: PHARMACY | Age: 86
End: 2021-08-03
Payer: MEDICARE

## 2021-08-03 DIAGNOSIS — Z79.01 LONG TERM CURRENT USE OF ANTICOAGULANT THERAPY: Primary | ICD-10-CM

## 2021-08-03 DIAGNOSIS — I48.21 PERMANENT ATRIAL FIBRILLATION (HCC): ICD-10-CM

## 2021-08-03 LAB — INR BLD: 2.5

## 2021-08-03 PROCEDURE — 85610 PROTHROMBIN TIME: CPT

## 2021-08-03 PROCEDURE — 99211 OFF/OP EST MAY X REQ PHY/QHP: CPT

## 2021-08-11 ENCOUNTER — OFFICE VISIT (OUTPATIENT)
Dept: FAMILY MEDICINE CLINIC | Age: 86
End: 2021-08-11
Payer: MEDICARE

## 2021-08-11 VITALS
OXYGEN SATURATION: 97 % | WEIGHT: 154 LBS | HEART RATE: 107 BPM | RESPIRATION RATE: 16 BRPM | TEMPERATURE: 96.9 F | SYSTOLIC BLOOD PRESSURE: 118 MMHG | BODY MASS INDEX: 26.43 KG/M2 | DIASTOLIC BLOOD PRESSURE: 84 MMHG

## 2021-08-11 DIAGNOSIS — E78.2 MIXED HYPERLIPIDEMIA: ICD-10-CM

## 2021-08-11 DIAGNOSIS — R73.02 IGT (IMPAIRED GLUCOSE TOLERANCE): ICD-10-CM

## 2021-08-11 DIAGNOSIS — R73.09 ELEVATED GLUCOSE: ICD-10-CM

## 2021-08-11 DIAGNOSIS — E55.9 VITAMIN D DEFICIENCY: ICD-10-CM

## 2021-08-11 DIAGNOSIS — I10 ESSENTIAL HYPERTENSION: Primary | ICD-10-CM

## 2021-08-11 DIAGNOSIS — I48.21 PERMANENT ATRIAL FIBRILLATION (HCC): ICD-10-CM

## 2021-08-11 DIAGNOSIS — C18.2 MALIGNANT NEOPLASM OF ASCENDING COLON (HCC): ICD-10-CM

## 2021-08-11 LAB
A/G RATIO: 1.5 (ref 1.1–2.2)
ALBUMIN SERPL-MCNC: 4.5 G/DL (ref 3.4–5)
ALP BLD-CCNC: 115 U/L (ref 40–129)
ALT SERPL-CCNC: 18 U/L (ref 10–40)
ANION GAP SERPL CALCULATED.3IONS-SCNC: 20 MMOL/L (ref 3–16)
AST SERPL-CCNC: 24 U/L (ref 15–37)
BASOPHILS ABSOLUTE: 0.1 K/UL (ref 0–0.2)
BASOPHILS RELATIVE PERCENT: 1.2 %
BILIRUB SERPL-MCNC: 0.7 MG/DL (ref 0–1)
BUN BLDV-MCNC: 15 MG/DL (ref 7–20)
CALCIUM SERPL-MCNC: 9.6 MG/DL (ref 8.3–10.6)
CHLORIDE BLD-SCNC: 103 MMOL/L (ref 99–110)
CHOLESTEROL, TOTAL: 195 MG/DL (ref 0–199)
CO2: 22 MMOL/L (ref 21–32)
CREAT SERPL-MCNC: 1 MG/DL (ref 0.6–1.2)
EOSINOPHILS ABSOLUTE: 0.1 K/UL (ref 0–0.6)
EOSINOPHILS RELATIVE PERCENT: 1.2 %
GFR AFRICAN AMERICAN: >60
GFR NON-AFRICAN AMERICAN: 52
GLOBULIN: 3.1 G/DL
GLUCOSE BLD-MCNC: 120 MG/DL (ref 70–99)
HBA1C MFR BLD: 6.6 %
HCT VFR BLD CALC: 46.8 % (ref 36–48)
HDLC SERPL-MCNC: 63 MG/DL (ref 40–60)
HEMOGLOBIN: 15.6 G/DL (ref 12–16)
LDL CHOLESTEROL CALCULATED: 109 MG/DL
LYMPHOCYTES ABSOLUTE: 1.5 K/UL (ref 1–5.1)
LYMPHOCYTES RELATIVE PERCENT: 20.9 %
MCH RBC QN AUTO: 33.4 PG (ref 26–34)
MCHC RBC AUTO-ENTMCNC: 33.3 G/DL (ref 31–36)
MCV RBC AUTO: 100.4 FL (ref 80–100)
MONOCYTES ABSOLUTE: 0.7 K/UL (ref 0–1.3)
MONOCYTES RELATIVE PERCENT: 9.9 %
NEUTROPHILS ABSOLUTE: 4.7 K/UL (ref 1.7–7.7)
NEUTROPHILS RELATIVE PERCENT: 66.8 %
PDW BLD-RTO: 14.2 % (ref 12.4–15.4)
PLATELET # BLD: 205 K/UL (ref 135–450)
PMV BLD AUTO: 10.2 FL (ref 5–10.5)
POTASSIUM SERPL-SCNC: 4.6 MMOL/L (ref 3.5–5.1)
RBC # BLD: 4.66 M/UL (ref 4–5.2)
SODIUM BLD-SCNC: 145 MMOL/L (ref 136–145)
TOTAL PROTEIN: 7.6 G/DL (ref 6.4–8.2)
TRIGL SERPL-MCNC: 115 MG/DL (ref 0–150)
TSH REFLEX: 2.14 UIU/ML (ref 0.27–4.2)
VITAMIN D 25-HYDROXY: 95.7 NG/ML
VLDLC SERPL CALC-MCNC: 23 MG/DL
WBC # BLD: 7.1 K/UL (ref 4–11)

## 2021-08-11 PROCEDURE — G8427 DOCREV CUR MEDS BY ELIG CLIN: HCPCS | Performed by: FAMILY MEDICINE

## 2021-08-11 PROCEDURE — 99214 OFFICE O/P EST MOD 30 MIN: CPT | Performed by: FAMILY MEDICINE

## 2021-08-11 PROCEDURE — 4040F PNEUMOC VAC/ADMIN/RCVD: CPT | Performed by: FAMILY MEDICINE

## 2021-08-11 PROCEDURE — G8417 CALC BMI ABV UP PARAM F/U: HCPCS | Performed by: FAMILY MEDICINE

## 2021-08-11 PROCEDURE — 36415 COLL VENOUS BLD VENIPUNCTURE: CPT | Performed by: FAMILY MEDICINE

## 2021-08-11 PROCEDURE — 1036F TOBACCO NON-USER: CPT | Performed by: FAMILY MEDICINE

## 2021-08-11 PROCEDURE — 1123F ACP DISCUSS/DSCN MKR DOCD: CPT | Performed by: FAMILY MEDICINE

## 2021-08-11 PROCEDURE — 1090F PRES/ABSN URINE INCON ASSESS: CPT | Performed by: FAMILY MEDICINE

## 2021-08-11 PROCEDURE — 83036 HEMOGLOBIN GLYCOSYLATED A1C: CPT | Performed by: FAMILY MEDICINE

## 2021-08-11 RX ORDER — DILTIAZEM HYDROCHLORIDE 240 MG/1
CAPSULE, COATED, EXTENDED RELEASE ORAL
Qty: 90 CAPSULE | Refills: 3 | Status: SHIPPED | OUTPATIENT
Start: 2021-08-11 | End: 2022-08-16

## 2021-08-11 RX ORDER — CLOTRIMAZOLE AND BETAMETHASONE DIPROPIONATE 10; .64 MG/G; MG/G
CREAM TOPICAL
Qty: 1 TUBE | Refills: 2 | Status: SHIPPED
Start: 2021-08-11 | End: 2021-09-20 | Stop reason: ALTCHOICE

## 2021-08-11 RX ORDER — MULTIVIT-MIN/IRON/FOLIC ACID/K 18-600-40
1 CAPSULE ORAL DAILY
Qty: 30 CAPSULE | Refills: 11 | Status: SHIPPED | OUTPATIENT
Start: 2021-08-11 | End: 2021-08-27 | Stop reason: DRUGHIGH

## 2021-08-11 RX ORDER — OMEPRAZOLE 40 MG/1
CAPSULE, DELAYED RELEASE ORAL
Qty: 90 CAPSULE | Refills: 3 | Status: SHIPPED | OUTPATIENT
Start: 2021-08-11 | End: 2022-06-28 | Stop reason: SDUPTHER

## 2021-08-11 RX ORDER — WARFARIN SODIUM 4 MG/1
TABLET ORAL
Qty: 90 TABLET | Refills: 5 | Status: SHIPPED | OUTPATIENT
Start: 2021-08-11 | End: 2022-06-28 | Stop reason: SDUPTHER

## 2021-08-11 RX ORDER — ATORVASTATIN CALCIUM 20 MG/1
TABLET, FILM COATED ORAL
Qty: 90 TABLET | Refills: 3 | Status: SHIPPED | OUTPATIENT
Start: 2021-08-11 | End: 2022-06-28 | Stop reason: SDUPTHER

## 2021-08-11 SDOH — ECONOMIC STABILITY: TRANSPORTATION INSECURITY
IN THE PAST 12 MONTHS, HAS LACK OF TRANSPORTATION KEPT YOU FROM MEETINGS, WORK, OR FROM GETTING THINGS NEEDED FOR DAILY LIVING?: NO

## 2021-08-11 SDOH — ECONOMIC STABILITY: INCOME INSECURITY: IN THE LAST 12 MONTHS, WAS THERE A TIME WHEN YOU WERE NOT ABLE TO PAY THE MORTGAGE OR RENT ON TIME?: NO

## 2021-08-11 SDOH — ECONOMIC STABILITY: FOOD INSECURITY: WITHIN THE PAST 12 MONTHS, YOU WORRIED THAT YOUR FOOD WOULD RUN OUT BEFORE YOU GOT MONEY TO BUY MORE.: NEVER TRUE

## 2021-08-11 SDOH — ECONOMIC STABILITY: FOOD INSECURITY: WITHIN THE PAST 12 MONTHS, THE FOOD YOU BOUGHT JUST DIDN'T LAST AND YOU DIDN'T HAVE MONEY TO GET MORE.: NEVER TRUE

## 2021-08-11 SDOH — ECONOMIC STABILITY: HOUSING INSECURITY
IN THE LAST 12 MONTHS, WAS THERE A TIME WHEN YOU DID NOT HAVE A STEADY PLACE TO SLEEP OR SLEPT IN A SHELTER (INCLUDING NOW)?: NO

## 2021-08-11 SDOH — ECONOMIC STABILITY: TRANSPORTATION INSECURITY
IN THE PAST 12 MONTHS, HAS THE LACK OF TRANSPORTATION KEPT YOU FROM MEDICAL APPOINTMENTS OR FROM GETTING MEDICATIONS?: NO

## 2021-08-11 ASSESSMENT — PATIENT HEALTH QUESTIONNAIRE - PHQ9
1. LITTLE INTEREST OR PLEASURE IN DOING THINGS: 0
SUM OF ALL RESPONSES TO PHQ9 QUESTIONS 1 & 2: 0
SUM OF ALL RESPONSES TO PHQ QUESTIONS 1-9: 0
2. FEELING DOWN, DEPRESSED OR HOPELESS: 0
SUM OF ALL RESPONSES TO PHQ QUESTIONS 1-9: 0
SUM OF ALL RESPONSES TO PHQ QUESTIONS 1-9: 0

## 2021-08-11 ASSESSMENT — ENCOUNTER SYMPTOMS
DIARRHEA: 0
SORE THROAT: 0
CONSTIPATION: 0
RESPIRATORY NEGATIVE: 1
ANAL BLEEDING: 0
BLOOD IN STOOL: 0

## 2021-08-11 ASSESSMENT — SOCIAL DETERMINANTS OF HEALTH (SDOH): HOW HARD IS IT FOR YOU TO PAY FOR THE VERY BASICS LIKE FOOD, HOUSING, MEDICAL CARE, AND HEATING?: NOT HARD AT ALL

## 2021-08-11 NOTE — PROGRESS NOTES
Subjective:      Patient ID: Wayne Yanez is a 80 y.o. y.o. female. Here to follow meds    meds and hx reviewed  Has been OK / stable  There is no interval hx of hospitalization or significant illness  Patient lives independent. She drives. HPI      Chief Complaint   Patient presents with    Diabetes     did not get labs done but is fasting today        Allergies   Allergen Reactions    Amoxicillin Hives     Questionable allergy      Sulfa Antibiotics Hives       Past Medical History:   Diagnosis Date    Anemia 2016    Atrial fibrillation ([facility] [address] 2006    RESOLVED, ONE INCIDENT    Cancer ([facility] [address] 2016    colon cancer    Hyperlipidemia     Hypertension     HAS BEEN RUNNING NORMAL       Past Surgical History:   Procedure Laterality Date    APPENDECTOMY      COLON SURGERY Right 6/7/16    Robotic Assisted Laparoscopic Right Colectomy    COLONOSCOPY  5/18/16    ascending colon mass, biopsy    FRACTURE SURGERY      Right ORIF ANKLE    HYSTERECTOMY      BSO    UPPER GASTROINTESTINAL ENDOSCOPY  5/18/16    biopsy gastric       Social History     Socioeconomic History    Marital status:       Spouse name: Not on file    Number of children: Not on file    Years of education: Not on file    Highest education level: Not on file   Occupational History    Not on file   Tobacco Use    Smoking status: Never Smoker    Smokeless tobacco: Never Used   Vaping Use    Vaping Use: Never used   Substance and Sexual Activity    Alcohol use: No     Alcohol/week: 0.0 standard drinks    Drug use: No    Sexual activity: Not Currently   Other Topics Concern    Not on file   Social History Narrative    Not on file     Social Determinants of Health     Financial Resource Strain: Low Risk     Difficulty of Paying Living Expenses: Not hard at all   Food Insecurity: No Food Insecurity    Worried About [address] in the Last Year: Never true    [address] [facility] [address] in the Last Year: Never true Transportation Needs: No Transportation Needs    Lack of Transportation (Medical): No    Lack of Transportation (Non-Medical): No   Physical Activity:     Days of Exercise per Week:     Minutes of Exercise per Session:    Stress:     Feeling of Stress :    Social Connections:     Frequency of Communication with Friends and Family:     Frequency of Social Gatherings with Friends and Family:     Attends Protestant Services:     Active Member of Clubs or Organizations:     Attends Club or Organization Meetings:     Marital Status:    Intimate Partner Violence:     Fear of Current or Ex-Partner:     Emotionally Abused:     Physically Abused:     Sexually Abused:        Family History   Problem Relation Age of Onset    Cancer Mother         breast       Vitals:    08/11/21 0959   BP: 118/84   Pulse: 107   Resp: 16   Temp: 96.9 °F (36.1 °C)   SpO2: 97%       Wt Readings from Last 3 Encounters:   08/11/21 154 lb (69.9 kg)   09/28/20 158 lb (71.7 kg)   07/06/20 153 lb (69.4 kg)       Review of Systems   Constitutional: Negative for activity change and unexpected weight change. HENT: Negative for sore throat. Respiratory: Negative. Cardiovascular:        Very rare transient palpitation    Warfarin dose managed at the coumadin clinic   Gastrointestinal: Negative for anal bleeding, blood in stool, constipation and diarrhea. Remote history of colon cancer and surgery. Has been well with no change in bowel habits. Genitourinary: Negative for dysuria and hematuria. Neurological: Negative for seizures, facial asymmetry, speech difficulty and numbness. Psychiatric/Behavioral: Negative. Objective:   Physical Exam  Vitals reviewed. Constitutional:       Appearance: She is not ill-appearing. Eyes:      General: No scleral icterus. Extraocular Movements: Extraocular movements intact. Cardiovascular:      Rate and Rhythm: Normal rate. Rhythm irregular. Pulses: Normal pulses. Heart sounds: Normal heart sounds. No murmur heard. Pulmonary:      Effort: Pulmonary effort is normal. No respiratory distress. Breath sounds: Normal breath sounds. Abdominal:      General: There is no distension. Palpations: Abdomen is soft. There is no mass. Tenderness: There is no abdominal tenderness. Musculoskeletal:      Right lower leg: No edema. Left lower leg: No edema. Lymphadenopathy:      Cervical: No cervical adenopathy. Skin:     General: Skin is dry. Comments: Sees dermatologist   Neurological:      Mental Status: She is alert and oriented to person, place, and time. Psychiatric:         Mood and Affect: Mood normal.         Behavior: Behavior normal.         Thought Content: Thought content normal.         Assessment:       Diagnosis Orders   1. Elevated glucose  POCT glycosylated hemoglobin (Hb A1C)   2. Permanent atrial fibrillation (HCC)  warfarin (COUMADIN) 4 MG tablet   Hypertension, treated  Hyperlipidemia, treated  History of colon cancer, resected, remission      Plan:     A1c  6.6  Will not rx dm meds  Discussed watching excessive sweets and carbohydrates. Would not recommend dramatic change in her program.  General health issues and surveillance issues discussed.       Labs today    General inst   get a flu vaccine in October    Follow-up 6 months    Current Outpatient Medications   Medication Sig Dispense Refill    dilTIAZem (CARDIZEM CD) 240 MG extended release capsule TAKE ONE CAPSULE BY MOUTH DAILY 90 capsule 1    atorvastatin (LIPITOR) 20 MG tablet TAKE ONE TABLET BY MOUTH DAILY 90 tablet 1    warfarin (COUMADIN) 4 MG tablet TAKE ONE TABLET BY MOUTH DAILY 90 tablet 2    Cholecalciferol (VITAMIN D) 50 MCG (2000 UT) CAPS capsule Take 1 capsule by mouth daily      omeprazole (PRILOSEC) 40 MG delayed release capsule TAKE ONE CAPSULE BY MOUTH DAILY 90 capsule 3    clotrimazole-betamethasone (LOTRISONE) 1-0.05 % cream APPLY TWO TIMES A DAY 1 Tube 2    Loratadine (CLARITIN PO) Take by mouth Indications: prn allergies       No current facility-administered medications for this visit.

## 2021-08-12 ENCOUNTER — HOSPITAL ENCOUNTER (OUTPATIENT)
Dept: MAMMOGRAPHY | Age: 86
Discharge: HOME | End: 2021-08-12
Payer: MEDICARE

## 2021-08-12 DIAGNOSIS — Z12.31 BREAST CANCER SCREENING BY MAMMOGRAM: ICD-10-CM

## 2021-08-12 PROCEDURE — 77063 BREAST TOMOSYNTHESIS BI: CPT

## 2021-08-26 ENCOUNTER — TELEPHONE (OUTPATIENT)
Dept: FAMILY MEDICINE CLINIC | Age: 86
End: 2021-08-26

## 2021-08-27 RX ORDER — MULTIVIT-MIN/IRON/FOLIC ACID/K 18-600-40
1 CAPSULE ORAL
Qty: 30 CAPSULE | Refills: 11 | Status: SHIPPED
Start: 2021-08-27 | End: 2022-06-28 | Stop reason: SDUPTHER

## 2021-08-27 NOTE — TELEPHONE ENCOUNTER
Spoke to the patient and discussed her labs in detail. Sugar is slightly elevated but A1c is 6.6. Last summer was 6.4. Discussed the level is satisfactory and if she has a sweet tooth just to modify sugar and candy intake a little bit. Vitamin D is robust and she is taking 2000 international units daily and I asked her to cut back to 3 times a week. Continue other medications and follow-up in the spring.

## 2021-08-31 ENCOUNTER — ANTI-COAG VISIT (OUTPATIENT)
Dept: PHARMACY | Age: 86
End: 2021-08-31
Payer: MEDICARE

## 2021-08-31 DIAGNOSIS — Z79.01 LONG TERM CURRENT USE OF ANTICOAGULANT THERAPY: Primary | ICD-10-CM

## 2021-08-31 DIAGNOSIS — I48.21 PERMANENT ATRIAL FIBRILLATION (HCC): ICD-10-CM

## 2021-08-31 LAB — INR BLD: 2.9

## 2021-08-31 PROCEDURE — 85610 PROTHROMBIN TIME: CPT | Performed by: FAMILY MEDICINE

## 2021-08-31 PROCEDURE — 99211 OFF/OP EST MAY X REQ PHY/QHP: CPT | Performed by: FAMILY MEDICINE

## 2021-08-31 NOTE — PROGRESS NOTES
Ms. Irwin Cho is here for management of anticoagulation for Atrial Fibrillation. PMH also significant for HTN and HLD. She presents today w/out complaint. Pt verifies dosing regimen as listed above. Pt denies s/sx bleeding/bruising/swelling/SOB. Patient reports not taking any OTC/Herbals/RX. Reviewed dietary concerns. No ETOH or tobacco use. No changes with medications. No changes. Has not been feeling as well recently, so she's eaten less vit k. INR 2.9 is within the therapeutic range of 2-3. Recommend to continue dose of 4 mg daily except for 2 mg on Mon/Fri. Patient has 4 mg tablets. Will continue to monitor and check INR in 4 weeks. Dosing reminder card given with phone number, appointment date and time.    Return to clinic: 9/28  @ 1130am  Referring Physician: SILKE Lopez, PharmD 8/31/2021 11:24 AM

## 2021-09-17 NOTE — PROGRESS NOTES
[address]   Electrophysiology  Note              Date:  September 20, 2021  Patient name: Pat Cabrera  YOB: 1931    Primary Care physician: Neva Colunga DO    HISTORY OF PRESENT ILLNESS: The patient is a 80 y.o.  female with a history of permanent atrial fibrillation, HTN, HLD, and colon cancer. She was admitted in 6/2016 for an elective right colectomy. Her rhythm converted to atrial fibrillation with RVR during her hospitalization. At her visits in 7/2016 and 8/2016 her rhythm was sinus martínez and her metoprolol and diltiazem were decreased. She was back in afib in 2/2017 (asymptomatic) and has remained in AF at subsequent visits. Metoprolol was stopped in 8/2017 due to fatigue and dizziness. At her visit in 2/2018, she reported symptoms resolved off metoprolol. A 24 hour Holter in 10/2020 showed afib with adequate HR control. Today, she is being seen for atrial fibrillation. EKG shows atrial fibrillation with a HR of 77. She has rare papiitations but is feeling well overall. Feels dizzy if she doesn't drink enough water. Past Medical History:   has a past medical history of Anemia, Atrial fibrillation ([facility] Cancer ([facility] Hyperlipidemia, and Hypertension. Past Surgical History:   has a past surgical history that includes Appendectomy; Hysterectomy; Colonoscopy (5/18/16); Upper gastrointestinal endoscopy (5/18/16); fracture surgery; and Colon surgery (Right, 6/7/16). Home Medications:    Prior to Admission medications    Medication Sig Start Date End Date Taking?  Authorizing Provider   Cholecalciferol (VITAMIN D) 50 MCG (2000 UT) CAPS capsule Take 1 capsule by mouth three times a week 8/27/21  Yes Neva Colunga, DO   warfarin (COUMADIN) 4 MG tablet TAKE ONE TABLET BY MOUTH DAILY 8/11/21  Yes Neva Colunga, DO   omeprazole (PRILOSEC) 40 MG delayed release capsule TAKE ONE CAPSULE BY MOUTH DAILY 8/11/21  Yes Neva Colunga, DO   dilTIAZem (CARDIZEM CD) 240 MG extended release capsule TAKE ONE CAPSULE BY MOUTH DAILY 8/11/21  Yes Víctor Tillman DO   atorvastatin (LIPITOR) 20 MG tablet TAKE ONE TABLET BY MOUTH DAILY 8/11/21  Yes Víctor Tillman DO       Allergies:  Amoxicillin and Sulfa antibiotics    Social History:   reports that she has never smoked. She has never used smokeless tobacco. She reports that she does not drink alcohol and does not use drugs. Family History: family history includes Breast Cancer in her mother; Cancer in her mother. Review of Systems   Constitutional: + fatigue  HENT: Negative. Eyes: Negative. Respiratory: Negative. Cardiovascular: see HPI  Gastrointestinal: Negative. Genitourinary: Negative. Musculoskeletal: Negative. Skin: Negative. Neurological: + occasional dizziness   Hematological: Negative. Psychiatric/Behavioral: Negative. PHYSICAL EXAM:    Vital signs:    /70   Pulse 77   Ht 5' 4\" (1.626 m)   Wt 153 lb (69.4 kg)   SpO2 96%   BMI 26.26 kg/m²      Constitutional and general appearance: alert, cooperative, no distress and appears stated age  HEENT: PERRL, no cervical lymphadenopathy. No masses palpable.  Normal oral mucosa  Respiratory:  · Normal excursion and expansion without use of accessory muscles  · Resp auscultation: Normal breath sounds without dullness or wheezing  Cardiovascular:  · The apical impulse is not displaced  · Heart tones are crisp and normal. Irregular S1 and S2.  · Jugular venous pulsation Normal  · The carotid upstroke is normal in amplitude and contour without delay or bruit  · Peripheral pulses are symmetrical and full   Abdomen:  · No masses or tenderness  · Bowel sounds present  Extremities:  ·  No cyanosis or clubbing  ·  No lower extremity edema  ·  Skin: warm and dry  Neurological:  · Alert and oriented  · Moves all extremities well  · No abnormalities of mood, affect, memory, mentation, or behavior are noted    DATA:    ECG  9/20/2021:  Atrial fibrillation HR 68    CARDIOLOGY LABS:   CBC: No results for input(s): WBC, HGB, HCT, PLT in the last 72 hours. BMP: No results for input(s): NA, K, CO2, BUN, CREATININE, LABGLOM, GLUCOSE in the last 72 hours. PT/INR:   No results for input(s): PROTIME, INR in the last 72 hours. APTT:No results for input(s): APTT in the last 72 hours. FASTING LIPID PANEL:  Lab Results   Component Value Date    HDL 63 08/11/2021    LDLCALC 109 08/11/2021    TRIG 115 08/11/2021     LIVER PROFILE:No results for input(s): AST, ALT, ALB in the last 72 hours. Assessment:   1. Permanent atrial fibrillation: stable   -HR controlled   -[id number] score 4 (age, gender, HTN)  2. HTN: controlled  3. Colon cancer: s/p right colectomy on 6/7/2016  4. Anemia: intolerant of iron supplements    Plan:   1. Continue Cardizem and Coumadin (Clinic managing)  2. Monitor BP at home and call if consistently out of goal ranges   3. Annual CBC (due 8/2022)  4.  Follow up in one year     MDM: maksim Muro, APRN-CNP  [address]  [phone]

## 2021-09-20 ENCOUNTER — OFFICE VISIT (OUTPATIENT)
Dept: CARDIOLOGY CLINIC | Age: 86
End: 2021-09-20
Payer: MEDICARE

## 2021-09-20 VITALS
HEIGHT: 64 IN | OXYGEN SATURATION: 96 % | SYSTOLIC BLOOD PRESSURE: 120 MMHG | BODY MASS INDEX: 26.12 KG/M2 | DIASTOLIC BLOOD PRESSURE: 70 MMHG | WEIGHT: 153 LBS | HEART RATE: 77 BPM

## 2021-09-20 DIAGNOSIS — I48.21 PERMANENT ATRIAL FIBRILLATION (HCC): Primary | ICD-10-CM

## 2021-09-20 DIAGNOSIS — I10 ESSENTIAL HYPERTENSION: ICD-10-CM

## 2021-09-20 PROCEDURE — 4040F PNEUMOC VAC/ADMIN/RCVD: CPT | Performed by: NURSE PRACTITIONER

## 2021-09-20 PROCEDURE — 1123F ACP DISCUSS/DSCN MKR DOCD: CPT | Performed by: NURSE PRACTITIONER

## 2021-09-20 PROCEDURE — G8427 DOCREV CUR MEDS BY ELIG CLIN: HCPCS | Performed by: NURSE PRACTITIONER

## 2021-09-20 PROCEDURE — 93000 ELECTROCARDIOGRAM COMPLETE: CPT | Performed by: NURSE PRACTITIONER

## 2021-09-20 PROCEDURE — 1090F PRES/ABSN URINE INCON ASSESS: CPT | Performed by: NURSE PRACTITIONER

## 2021-09-20 PROCEDURE — 1036F TOBACCO NON-USER: CPT | Performed by: NURSE PRACTITIONER

## 2021-09-20 PROCEDURE — 99214 OFFICE O/P EST MOD 30 MIN: CPT | Performed by: NURSE PRACTITIONER

## 2021-09-20 PROCEDURE — G8417 CALC BMI ABV UP PARAM F/U: HCPCS | Performed by: NURSE PRACTITIONER

## 2021-09-20 NOTE — LETTER
[address] Cardiology - [address] [facility] [address] [facility]  Phone: [phone]  Fax: 228 [facility], APRN - CNP      September 22, 2021     Patient: Alphonso Beard   MR Number: <[id number]>   YOB: 1931   Date of Visit: 9/20/2021       Dear Dr. Sulma Riojas ref. provider found: Thank you for referring Sadaf Neil to me for evaluation/treatment. Below are the relevant portions of my assessment and plan of care. If you have questions, please do not hesitate to call me. I look forward to following Lexi Hardin along with you.     Sincerely,        Kvng Muniz, CLAIRE - CNP    CC providers:  Juan Carlos Dhaliwal DO  [address] [facility] [address]  [address]

## 2021-09-28 ENCOUNTER — ANTI-COAG VISIT (OUTPATIENT)
Dept: PHARMACY | Age: 86
End: 2021-09-28
Payer: MEDICARE

## 2021-09-28 LAB — INR BLD: 4.5

## 2021-09-28 PROCEDURE — 85610 PROTHROMBIN TIME: CPT

## 2021-09-28 PROCEDURE — 99211 OFF/OP EST MAY X REQ PHY/QHP: CPT

## 2021-09-28 NOTE — PROGRESS NOTES
Ms. Soledad Lopez is here for management of anticoagulation for Atrial Fibrillation. PMH also significant for HTN and HLD. She presents today w/out complaint. Pt verifies dosing regimen as listed above. Pt denies s/sx bleeding/bruising/swelling/SOB. Patient reports not taking any OTC/Herbals/RX. Reviewed dietary concerns. No ETOH or tobacco use. No changes with medications. No medication changes per patient. Reports diet has decreased in green veggies, used to eat a lot of salads and has not been. Denies any changes in bruising/ bleeding. (FYI, looking back to last year around this time she had a few high INR's as well)      INR 4.5 is above the therapeutic range of 2-3. Recommend to hold tonight's dose and take 2 mg tomorrow (9/29) then continue dose of 4 mg daily except for 2 mg on Mon/Fri. Patient has 4 mg tablets. Will continue to monitor and check INR in 2 weeks. Dosing reminder card given with phone number, appointment date and time. Return to clinic: 10/12  @ 1130am  Referring Physician: SILKE Hussein  PharmD Candidate, 2022 9/28/2021 11:22 AM    I have seen the patient and reviewed the progress note written by the PharmD Candidate. I agree with this assesment and plan.    Katherin Ramirez, [facility], PharmD 9/28/21 11:38 AM

## 2021-10-05 LAB — INR BLD: 2.3

## 2021-10-12 ENCOUNTER — ANTI-COAG VISIT (OUTPATIENT)
Dept: PHARMACY | Age: 86
End: 2021-10-12
Payer: MEDICARE

## 2021-10-12 PROCEDURE — 85610 PROTHROMBIN TIME: CPT

## 2021-10-12 PROCEDURE — 99211 OFF/OP EST MAY X REQ PHY/QHP: CPT

## 2021-10-12 NOTE — PROGRESS NOTES
Ms. Dalila Nunez is here for management of anticoagulation for Atrial Fibrillation. PMH also significant for HTN and HLD. She presents today w/out complaint. Pt verifies dosing regimen as listed above. Pt denies s/sx bleeding/bruising/swelling/SOB. Patient reports not taking any OTC/Herbals/RX. Reviewed dietary concerns. No ETOH or tobacco use. No changes with medications. No medication changes per patient. Pt will have a cancer spot (skin) removed on 11/10. Scheduled to check INR on 11/9. Denies any changes in bruising/ bleeding. INR 2.3 is within the therapeutic range of 2-3. Recommend to continue dose of 4 mg daily except for 2 mg on Mon/Fri. Patient has 4 mg tablets. Will continue to monitor and check INR in 4 weeks. Dosing reminder card given with phone number, appointment date and time. Return to clinic: 11/9  @ 1130am  Referring Physician: SILKE Daniels. Allyson Lakhani  PharmD Candidate, 2022  10/12/2021 11:22 AM    I have seen the patient and reviewed the progress note written by the PharmD Candidate. I agree with this assesment and plan.    Cl Caceres, [address] [place] [address], PharmD 10/12/21 12:52 PM

## 2021-11-09 ENCOUNTER — ANTI-COAG VISIT (OUTPATIENT)
Dept: PHARMACY | Age: 86
End: 2021-11-09
Payer: MEDICARE

## 2021-11-09 DIAGNOSIS — Z79.01 LONG TERM CURRENT USE OF ANTICOAGULANT THERAPY: Primary | ICD-10-CM

## 2021-11-09 DIAGNOSIS — I48.21 PERMANENT ATRIAL FIBRILLATION (HCC): ICD-10-CM

## 2021-11-09 LAB — INR BLD: 2.9

## 2021-11-09 PROCEDURE — 99211 OFF/OP EST MAY X REQ PHY/QHP: CPT | Performed by: FAMILY MEDICINE

## 2021-11-09 PROCEDURE — 85610 PROTHROMBIN TIME: CPT | Performed by: FAMILY MEDICINE

## 2021-11-09 NOTE — PROGRESS NOTES
Ms. Juayn Mitchell is here for management of anticoagulation for Atrial Fibrillation. PMH also significant for HTN and HLD. She presents today w/out complaint. Pt verifies dosing regimen as listed above. Pt denies s/sx bleeding/bruising/swelling/SOB. Patient reports not taking any OTC/Herbals/RX. Reviewed dietary concerns. No ETOH or tobacco use. No changes with medications. No medication changes per patient. Pt will have a cancer spot (skin) removed on 11/10. Scheduled to check INR on 11/9. Denies any changes in bruising/ bleeding. INR 2.9 is within the therapeutic range of 2-3. Recommend to continue dose of 4 mg daily except for 2 mg on Mon/Fri. Patient has 4 mg tablets. Will continue to monitor and check INR in 4 weeks. Dosing reminder card given with phone number, appointment date and time.    Return to clinic: 12/7 @ 1130 am   Referring Physician: SILKE Lloyd, PharmD 11/9/2021 11:21 AM

## 2021-12-07 ENCOUNTER — ANTI-COAG VISIT (OUTPATIENT)
Dept: PHARMACY | Age: 86
End: 2021-12-07
Payer: MEDICARE

## 2021-12-07 LAB — INR BLD: 3

## 2021-12-07 PROCEDURE — 85610 PROTHROMBIN TIME: CPT

## 2021-12-07 PROCEDURE — 99211 OFF/OP EST MAY X REQ PHY/QHP: CPT

## 2021-12-07 NOTE — PROGRESS NOTES
Ms. Stephanie Blackburn is here for management of anticoagulation for Atrial Fibrillation. PMH also significant for HTN and HLD. She presents today w/out complaint. Pt verifies dosing regimen as listed above. Pt denies s/sx bleeding/bruising/swelling/SOB. Patient reports not taking any OTC/Herbals/RX. Reviewed dietary concerns. No ETOH or tobacco use. No changes with medications. Pt had a cancer spot (skin) removed on 11/10. Pt states she missed one dose on 11/24, otherwise no changes to medications/diet. INR 3.0 is within the therapeutic range of 2-3. Recommend to continue dose of 4 mg daily except for 2 mg on Mon/Fri. Patient has 4 mg tablets. Will continue to monitor and check INR in 4 weeks. Dosing reminder card given with phone number, appointment date and time. Return to clinic: 1/4 @ 11:30 am  Referring Physician: SILKE Pina  PharmD Candidate 2022 12/7/2021 11:20 AM    I have seen the patient and reviewed the progress note written by the PharmD Candidate. I agree with this assesment and plan.    Marlyn Connell, [facility] [place], PharmD 12/7/21 11:28 AM

## 2022-01-04 ENCOUNTER — ANTI-COAG VISIT (OUTPATIENT)
Dept: PHARMACY | Age: 87
End: 2022-01-04
Payer: MEDICARE

## 2022-01-04 DIAGNOSIS — I48.21 PERMANENT ATRIAL FIBRILLATION (HCC): ICD-10-CM

## 2022-01-04 DIAGNOSIS — Z79.01 LONG TERM CURRENT USE OF ANTICOAGULANT THERAPY: Primary | ICD-10-CM

## 2022-01-04 LAB — INTERNATIONAL NORMALIZATION RATIO, POC: 3

## 2022-01-04 PROCEDURE — 85610 PROTHROMBIN TIME: CPT

## 2022-01-04 PROCEDURE — 99211 OFF/OP EST MAY X REQ PHY/QHP: CPT

## 2022-01-04 NOTE — PROGRESS NOTES
Ms. Jeanna Grande is here for management of anticoagulation for Atrial Fibrillation. PMH also significant for HTN and HLD. She presents today w/out complaint. Pt verifies dosing regimen as listed above. Pt denies s/sx bleeding/bruising/swelling/SOB. Patient reports not taking any OTC/Herbals/RX. Reviewed dietary concerns. No ETOH or tobacco use. No changes with medications. Pt had a cancer spot (skin) removed on 11/10. INR 3.0 is within the therapeutic range of 2-3. Recommend to continue dose of 4 mg daily except for 2 mg on Mon/Fri. Patient has 4 mg tablets. Will continue to monitor and check INR in 4 weeks. Dosing reminder card given with phone number, appointment date and time.    Return to clinic: 2/1 @ 11:30 am  Referring Physician: SILKE Shen, PharmD  1/4/2022 at 11:30 AM

## 2022-02-01 ENCOUNTER — ANTI-COAG VISIT (OUTPATIENT)
Dept: PHARMACY | Age: 87
End: 2022-02-01
Payer: MEDICARE

## 2022-02-01 DIAGNOSIS — Z79.01 LONG TERM CURRENT USE OF ANTICOAGULANT THERAPY: Primary | ICD-10-CM

## 2022-02-01 DIAGNOSIS — I48.21 PERMANENT ATRIAL FIBRILLATION (HCC): ICD-10-CM

## 2022-02-01 LAB — INR BLD: 2.4

## 2022-02-01 PROCEDURE — 99211 OFF/OP EST MAY X REQ PHY/QHP: CPT

## 2022-02-01 PROCEDURE — 85610 PROTHROMBIN TIME: CPT

## 2022-02-18 NOTE — PROGRESS NOTES
[id number]   Ms. Eli Yap is here for management of anticoagulation for Atrial Fibrillation. PMH also significant for HTN and HLD. She presents today w/out complaint. Pt verifies dosing regimen as listed above. Pt denies s/sx bleeding/bruising/swelling/SOB. Patient reports not taking any OTC/Herbals/RX. Reviewed dietary concerns. No ETOH or tobacco use. No changes with medications. INR 2.5  is within the therapeutic range of 2-3. Recommend to continue dose of 4 mg daily except for 2 mg on Mon/Fri. Patient has 4 mg tablets. Will continue to monitor and check INR in 4 weeks. Dosing reminder card given with phone number, appointment date and time. Return to clinic: 8/31  @ 1130am  Referring Physician: SILKE Ryan  PharmD Student 2022  8/3/2021 11:26 AM    I have seen the patient and reviewed the progress note written by the PharmD Candidate. I agree with this assesment and plan.    ARTEMIO Quan [facility], PharmD 8/3/21 11:39 AM

## 2022-03-01 ENCOUNTER — ANTI-COAG VISIT (OUTPATIENT)
Dept: PHARMACY | Age: 87
End: 2022-03-01
Payer: MEDICARE

## 2022-03-01 DIAGNOSIS — Z79.01 LONG TERM CURRENT USE OF ANTICOAGULANT THERAPY: Primary | ICD-10-CM

## 2022-03-01 DIAGNOSIS — I48.21 PERMANENT ATRIAL FIBRILLATION (HCC): ICD-10-CM

## 2022-03-01 LAB — INTERNATIONAL NORMALIZATION RATIO, POC: 2.6

## 2022-03-01 NOTE — PROGRESS NOTES
Ms. Madison Story is here for management of anticoagulation for Atrial Fibrillation. PMH also significant for HTN and HLD. She presents today w/out complaint. Pt verifies dosing regimen as listed above. Pt denies s/sx bleeding/bruising/swelling/SOB. Patient reports not taking any OTC/Herbals/RX. Reviewed dietary concerns. No ETOH or tobacco use. No changes with medications. Pt had a cancer spot (skin) removed on 11/10. INR 2.6 is within the therapeutic range of 2-3. Recommend to continue dose of 4 mg daily except for 2 mg on Mon/Fri. Patient has 4 mg tablets. Will continue to monitor and check INR in 4 weeks. Dosing reminder card given with phone number, appointment date and time.    Return to clinic: 3/29  @ 11:30 am  Referring Physician: Edith Divine, NP Primus Dubin, PharmD  3/1/2022 at 11:40 AM

## 2022-03-29 ENCOUNTER — ANTI-COAG VISIT (OUTPATIENT)
Dept: PHARMACY | Age: 87
End: 2022-03-29
Payer: MEDICARE

## 2022-03-29 DIAGNOSIS — I48.21 PERMANENT ATRIAL FIBRILLATION (HCC): ICD-10-CM

## 2022-03-29 DIAGNOSIS — Z79.01 LONG TERM CURRENT USE OF ANTICOAGULANT THERAPY: Primary | ICD-10-CM

## 2022-03-29 LAB — INR BLD: 3.2

## 2022-03-29 PROCEDURE — 99211 OFF/OP EST MAY X REQ PHY/QHP: CPT

## 2022-03-29 PROCEDURE — 85610 PROTHROMBIN TIME: CPT

## 2022-03-29 NOTE — PROGRESS NOTES
Ms. Katie Moody is here for management of anticoagulation for Atrial Fibrillation. PMH also significant for HTN and HLD. She presents today w/out complaint. Pt verifies dosing regimen as listed above. Pt denies s/sx bleeding/bruising/swelling/SOB. Patient reports not taking any OTC/Herbals/RX. Reviewed dietary concerns. No ETOH or tobacco use. No changes with medications. Pt had a cancer spot (skin) removed on 11/10. Patient states they haven't had as many greens as they normally do, because of having family over. She will be going back to her normal diet. INR 3.2 is above the therapeutic range of 2-3. Recommend to continue dose of 4 mg daily except for 2 mg on Mon/Fri. Patient has 4 mg tablets. Will continue to monitor and check INR in 4 weeks. Dosing reminder card given with phone number, appointment date and time. Return to clinic: 4/26  @ 11:30 am  Referring Physician: SILKE Mancini   PharmD Candidate, 2022  3/29/2022 at 11:29 AM    I have seen the patient and reviewed the progress note written by the PharmD Candidate. I agree with this assesment and plan.    Renuka Albright, [facility], PharmD 3/29/22 11:45 AM

## 2022-04-26 ENCOUNTER — ANTI-COAG VISIT (OUTPATIENT)
Dept: PHARMACY | Age: 87
End: 2022-04-26
Payer: MEDICARE

## 2022-04-26 DIAGNOSIS — I48.21 PERMANENT ATRIAL FIBRILLATION (HCC): ICD-10-CM

## 2022-04-26 DIAGNOSIS — Z79.01 LONG TERM CURRENT USE OF ANTICOAGULANT THERAPY: Primary | ICD-10-CM

## 2022-04-26 LAB — INTERNATIONAL NORMALIZATION RATIO, POC: 2.4

## 2022-04-26 PROCEDURE — 99211 OFF/OP EST MAY X REQ PHY/QHP: CPT

## 2022-04-26 PROCEDURE — 85610 PROTHROMBIN TIME: CPT

## 2022-05-24 ENCOUNTER — ANTI-COAG VISIT (OUTPATIENT)
Dept: PHARMACY | Age: 87
End: 2022-05-24
Payer: MEDICARE

## 2022-05-24 LAB — INR BLD: 2.6

## 2022-05-24 PROCEDURE — 99211 OFF/OP EST MAY X REQ PHY/QHP: CPT

## 2022-05-24 PROCEDURE — 85610 PROTHROMBIN TIME: CPT

## 2022-05-24 NOTE — PROGRESS NOTES
Ms. Yamile Kurtz is here for management of anticoagulation for Atrial Fibrillation. PMH also significant for HTN and HLD. She presents today w/out complaint. Pt verifies dosing regimen as listed above. Pt denies s/sx bleeding/bruising/swelling/SOB. Patient reports not taking any OTC/Herbals/RX. Reviewed dietary concerns. No ETOH or tobacco use. No changes with medications. Pt had a cancer spot (skin) removed on 11/10. Pt reports eating a little more greens otherwise no other changes. INR 2.6 is within the therapeutic range of 2-3. Recommend to continue dose of 4 mg daily except for 2 mg on Mon/Fri. Patient has 4 mg tablets. Will continue to monitor and check INR in 4 weeks. Dosing reminder card given with phone number, appointment date and time. Return to clinic: 06/21  @ 11:30 am  Referring Physician: SILKE Mcdowell, Student Pharmacist 5/24/22 11:28 AM    I have seen the patient and reviewed the progress note written by the PharmD Candidate. I agree with this assesment and plan.    Torres Hernandez, [address] [place] [address], PharmD 5/24/22 11:42 AM Never smoker

## 2022-06-20 ENCOUNTER — TELEPHONE (OUTPATIENT)
Dept: FAMILY MEDICINE CLINIC | Age: 87
End: 2022-06-20

## 2022-06-20 NOTE — TELEPHONE ENCOUNTER
----- Message from Presley Shaw sent at 6/20/2022 11:05 AM EDT -----  Subject: Message to Provider    QUESTIONS  Information for Provider? Patient is needing a notice of medical need for   her lift chair. Medicare is needing this to cover it through [facility]. Patient is requesting a call back as soon as possible as Stacy Rossi has   started working on the claim today.   ---------------------------------------------------------------------------  --------------  [address]  What is the best way for the office to contact you? OK to leave message on   voicemail  Preferred Call Back Phone Number? [phone]  ---------------------------------------------------------------------------  --------------  SCRIPT ANSWERS  Relationship to Patient?  Self

## 2022-06-21 ENCOUNTER — ANTI-COAG VISIT (OUTPATIENT)
Dept: PHARMACY | Age: 87
End: 2022-06-21
Payer: MEDICARE

## 2022-06-21 DIAGNOSIS — Z79.01 LONG TERM CURRENT USE OF ANTICOAGULANT THERAPY: Primary | ICD-10-CM

## 2022-06-21 DIAGNOSIS — I48.21 PERMANENT ATRIAL FIBRILLATION (HCC): ICD-10-CM

## 2022-06-21 LAB — INR BLD: 2.8

## 2022-06-21 PROCEDURE — 85610 PROTHROMBIN TIME: CPT

## 2022-06-21 PROCEDURE — 99211 OFF/OP EST MAY X REQ PHY/QHP: CPT

## 2022-06-21 NOTE — PROGRESS NOTES
Ms. eBcky Caballero is here for management of anticoagulation for Atrial Fibrillation. PMH also significant for HTN and HLD. She presents today w/out complaint. Pt verifies dosing regimen as listed above. Pt denies s/sx bleeding/bruising/swelling/SOB. Patient reports not taking any OTC/Herbals/RX. Reviewed dietary concerns. No ETOH or tobacco use. No changes with medications. Pt had a cancer spot (skin) removed on 11/10. Pt reports no changes. INR 2.8 is within the therapeutic range of 2-3. Recommend to continue dose of 4 mg daily except for 2 mg on Mon/Fri. Patient has 4 mg tablets. Will continue to monitor and check INR in 4 weeks. Dosing reminder card given with phone number, appointment date and time. Return to clinic: 07/19 @ 11:30 am  Referring Physician: SILKE Bell, PharmD Candidate 6/21/22 11:22 AM     I have seen the patient and reviewed the progress note written by the PharmD Candidate. I agree with this assesment and plan.    Patricia Yo, [facility], PharmD 6/21/22 11:38 AM

## 2022-06-28 ENCOUNTER — OFFICE VISIT (OUTPATIENT)
Dept: FAMILY MEDICINE CLINIC | Age: 87
End: 2022-06-28
Payer: MEDICARE

## 2022-06-28 VITALS
BODY MASS INDEX: 25.51 KG/M2 | WEIGHT: 149.4 LBS | RESPIRATION RATE: 16 BRPM | DIASTOLIC BLOOD PRESSURE: 70 MMHG | HEART RATE: 53 BPM | HEIGHT: 64 IN | OXYGEN SATURATION: 98 % | SYSTOLIC BLOOD PRESSURE: 128 MMHG | TEMPERATURE: 96.9 F

## 2022-06-28 DIAGNOSIS — I10 ESSENTIAL HYPERTENSION: Primary | ICD-10-CM

## 2022-06-28 DIAGNOSIS — I48.21 PERMANENT ATRIAL FIBRILLATION (HCC): ICD-10-CM

## 2022-06-28 DIAGNOSIS — E55.9 VITAMIN D DEFICIENCY: ICD-10-CM

## 2022-06-28 DIAGNOSIS — E78.2 MIXED HYPERLIPIDEMIA: ICD-10-CM

## 2022-06-28 DIAGNOSIS — I10 ESSENTIAL HYPERTENSION: ICD-10-CM

## 2022-06-28 LAB
A/G RATIO: 1.7 (ref 1.1–2.2)
ALBUMIN SERPL-MCNC: 4.5 G/DL (ref 3.4–5)
ALP BLD-CCNC: 112 U/L (ref 40–129)
ALT SERPL-CCNC: 15 U/L (ref 10–40)
ANION GAP SERPL CALCULATED.3IONS-SCNC: 16 MMOL/L (ref 3–16)
AST SERPL-CCNC: 20 U/L (ref 15–37)
BASOPHILS ABSOLUTE: 0 K/UL (ref 0–0.2)
BASOPHILS RELATIVE PERCENT: 0.6 %
BILIRUB SERPL-MCNC: 0.7 MG/DL (ref 0–1)
BUN BLDV-MCNC: 18 MG/DL (ref 7–20)
CALCIUM SERPL-MCNC: 9.5 MG/DL (ref 8.3–10.6)
CHLORIDE BLD-SCNC: 102 MMOL/L (ref 99–110)
CHOLESTEROL, TOTAL: 190 MG/DL (ref 0–199)
CO2: 23 MMOL/L (ref 21–32)
CREAT SERPL-MCNC: 1.2 MG/DL (ref 0.6–1.2)
EOSINOPHILS ABSOLUTE: 0 K/UL (ref 0–0.6)
EOSINOPHILS RELATIVE PERCENT: 0.7 %
GFR AFRICAN AMERICAN: 51
GFR NON-AFRICAN AMERICAN: 42
GLUCOSE BLD-MCNC: 121 MG/DL (ref 70–99)
HCT VFR BLD CALC: 42 % (ref 36–48)
HDLC SERPL-MCNC: 62 MG/DL (ref 40–60)
HEMOGLOBIN: 14.4 G/DL (ref 12–16)
LDL CHOLESTEROL CALCULATED: 108 MG/DL
LYMPHOCYTES ABSOLUTE: 1.1 K/UL (ref 1–5.1)
LYMPHOCYTES RELATIVE PERCENT: 22.9 %
MCH RBC QN AUTO: 34 PG (ref 26–34)
MCHC RBC AUTO-ENTMCNC: 34.3 G/DL (ref 31–36)
MCV RBC AUTO: 99.2 FL (ref 80–100)
MONOCYTES ABSOLUTE: 0.4 K/UL (ref 0–1.3)
MONOCYTES RELATIVE PERCENT: 9.3 %
NEUTROPHILS ABSOLUTE: 3.1 K/UL (ref 1.7–7.7)
NEUTROPHILS RELATIVE PERCENT: 66.5 %
PDW BLD-RTO: 14.1 % (ref 12.4–15.4)
PLATELET # BLD: 184 K/UL (ref 135–450)
PMV BLD AUTO: 9.3 FL (ref 5–10.5)
POTASSIUM SERPL-SCNC: 3.5 MMOL/L (ref 3.5–5.1)
RBC # BLD: 4.24 M/UL (ref 4–5.2)
SODIUM BLD-SCNC: 141 MMOL/L (ref 136–145)
TOTAL PROTEIN: 7.2 G/DL (ref 6.4–8.2)
TRIGL SERPL-MCNC: 100 MG/DL (ref 0–150)
VITAMIN D 25-HYDROXY: 61 NG/ML
VLDLC SERPL CALC-MCNC: 20 MG/DL
WBC # BLD: 4.7 K/UL (ref 4–11)

## 2022-06-28 PROCEDURE — G8417 CALC BMI ABV UP PARAM F/U: HCPCS | Performed by: FAMILY MEDICINE

## 2022-06-28 PROCEDURE — 1123F ACP DISCUSS/DSCN MKR DOCD: CPT | Performed by: FAMILY MEDICINE

## 2022-06-28 PROCEDURE — G8427 DOCREV CUR MEDS BY ELIG CLIN: HCPCS | Performed by: FAMILY MEDICINE

## 2022-06-28 PROCEDURE — 1090F PRES/ABSN URINE INCON ASSESS: CPT | Performed by: FAMILY MEDICINE

## 2022-06-28 PROCEDURE — 1036F TOBACCO NON-USER: CPT | Performed by: FAMILY MEDICINE

## 2022-06-28 PROCEDURE — 99214 OFFICE O/P EST MOD 30 MIN: CPT | Performed by: FAMILY MEDICINE

## 2022-06-28 RX ORDER — MULTIVIT-MIN/IRON/FOLIC ACID/K 18-600-40
1 CAPSULE ORAL
Qty: 30 CAPSULE | Refills: 11 | Status: SHIPPED | OUTPATIENT
Start: 2022-06-29

## 2022-06-28 RX ORDER — WARFARIN SODIUM 4 MG/1
TABLET ORAL
Qty: 90 TABLET | Refills: 5 | Status: SHIPPED | OUTPATIENT
Start: 2022-06-28

## 2022-06-28 RX ORDER — ATORVASTATIN CALCIUM 20 MG/1
TABLET, FILM COATED ORAL
Qty: 90 TABLET | Refills: 3 | Status: SHIPPED | OUTPATIENT
Start: 2022-06-28

## 2022-06-28 RX ORDER — OMEPRAZOLE 40 MG/1
CAPSULE, DELAYED RELEASE ORAL
Qty: 90 CAPSULE | Refills: 3 | Status: SHIPPED | OUTPATIENT
Start: 2022-06-28

## 2022-06-28 ASSESSMENT — PATIENT HEALTH QUESTIONNAIRE - PHQ9
SUM OF ALL RESPONSES TO PHQ QUESTIONS 1-9: 0
2. FEELING DOWN, DEPRESSED OR HOPELESS: 0
1. LITTLE INTEREST OR PLEASURE IN DOING THINGS: 0
SUM OF ALL RESPONSES TO PHQ9 QUESTIONS 1 & 2: 0
SUM OF ALL RESPONSES TO PHQ QUESTIONS 1-9: 0

## 2022-06-28 ASSESSMENT — ENCOUNTER SYMPTOMS
BLOOD IN STOOL: 0
DIARRHEA: 0
ABDOMINAL PAIN: 0
SHORTNESS OF BREATH: 0
WHEEZING: 0
CONSTIPATION: 0

## 2022-06-28 NOTE — PROGRESS NOTES
Here to follow up her meds. Says has been OK  Son lives with her, but she is Independent inADLs  Drives. Paces herself. Had a skin cancer removed from forehead. Hx colon cancer surgery 6 yrs-  No scope follow up    No bleeding . Bowels work OK,      With age, some difficulty getting up from a soft seat or low sitting chair    Has looked into Lift chair,    Hx a-fib, controlled On Warfarin  Subjective:      Patient ID: Magali Do is a 80 y.o. y.o. female. HPI      Chief Complaint   Patient presents with    Hypertension    Hyperlipidemia       Allergies   Allergen Reactions    Amoxicillin Hives     Questionable allergy      Sulfa Antibiotics Hives       Past Medical History:   Diagnosis Date    Anemia 2016    Atrial fibrillation ([address] 2006    RESOLVED, ONE INCIDENT    Cancer ([address] 2016    colon cancer    Hyperlipidemia     Hypertension     HAS BEEN RUNNING NORMAL    Skin cancer of forehead 11/2021       Past Surgical History:   Procedure Laterality Date    APPENDECTOMY      COLON SURGERY Right 6/7/16    Robotic Assisted Laparoscopic Right Colectomy    COLONOSCOPY  5/18/16    ascending colon mass, biopsy    FRACTURE SURGERY      Right ORIF ANKLE    HYSTERECTOMY (CERVIX STATUS UNKNOWN)      BSO    UPPER GASTROINTESTINAL ENDOSCOPY  5/18/16    biopsy gastric       Social History     Socioeconomic History    Marital status:       Spouse name: Not on file    Number of children: Not on file    Years of education: Not on file    Highest education level: Not on file   Occupational History    Not on file   Tobacco Use    Smoking status: Never Smoker    Smokeless tobacco: Never Used   Vaping Use    Vaping Use: Never used   Substance and Sexual Activity    Alcohol use: No     Alcohol/week: 0.0 standard drinks    Drug use: No    Sexual activity: Not Currently   Other Topics Concern    Not on file   Social History Narrative    Not on file     Social Determinants of Health Financial Resource Strain: Low Risk     Difficulty of Paying Living Expenses: Not hard at all   Food Insecurity: No Food Insecurity    Worried About Running Out of Food in the Last Year: Never true    Elayne of Food in the Last Year: Never true   Transportation Needs: No Transportation Needs    Lack of Transportation (Medical): No    Lack of Transportation (Non-Medical): No   Physical Activity:     Days of Exercise per Week: Not on file    Minutes of Exercise per Session: Not on file   Stress:     Feeling of Stress : Not on file   Social Connections:     Frequency of Communication with Friends and Family: Not on file    Frequency of Social Gatherings with Friends and Family: Not on file    Attends Worship Services: Not on file    Active Member of [address] [place] [address] [employer] or Organizations: Not on file    Attends Club or Organization Meetings: Not on file    Marital Status: Not on file   Intimate Partner Violence:     Fear of Current or Ex-Partner: Not on file    Emotionally Abused: Not on file    Physically Abused: Not on file    Sexually Abused: Not on file   Housing Stability: Unknown    Unable to Pay for Housing in the Last Year: No    Number of [place] in the Last Year: Not on file    Unstable Housing in the Last Year: No       Family History   Problem Relation Age of Onset    Cancer Mother         breast    Breast Cancer Mother        Vitals:    06/28/22 0933   BP: 128/70   Pulse: 53   Resp: 16   Temp: 96.9 °F (36.1 °C)   SpO2: 98%       Wt Readings from Last 3 Encounters:   06/28/22 149 lb 6.4 oz (67.8 kg)   09/20/21 153 lb (69.4 kg)   08/11/21 154 lb (69.9 kg)       Review of Systems   Constitutional: Negative for unexpected weight change. Has slowed down a bit   Respiratory: Negative for shortness of breath and wheezing. No orthopnea     some pedal edema   Cardiovascular: Negative for chest pain and palpitations.    Gastrointestinal: Negative for abdominal pain, blood in stool, constipation and diarrhea. Musculoskeletal:        Gait steady mostly. occ feels like a little balance. Discussed using a cane-       Neurological: Negative for dizziness, facial asymmetry and light-headedness. Psychiatric/Behavioral: Negative for dysphoric mood, self-injury and suicidal ideas. Objective:   Physical Exam  Constitutional:       Appearance: She is not ill-appearing. Eyes:      General: No scleral icterus. Cardiovascular:      Rate and Rhythm: Normal rate. Rhythm irregular. Heart sounds: Normal heart sounds. No murmur heard. Pulmonary:      Effort: Pulmonary effort is normal.      Breath sounds: Normal breath sounds. Abdominal:      General: There is no distension. Palpations: There is no mass. Tenderness: There is no abdominal tenderness. Musculoskeletal:      Comments: Uses her arms to help arise  gsait steady  Trace edema    Strength-  Lower ext 3 = right-  Hip and knee extensors  Left 3+-4  Lower ext    SLR  Right a bit weaker than left   Lymphadenopathy:      Cervical: No cervical adenopathy. Skin:     General: Skin is dry. Neurological:      Mental Status: She is alert and oriented to person, place, and time. Psychiatric:         Mood and Affect: Mood normal.         Thought Content: Thought content normal.         Assessment:       Diagnosis Orders   1.  Permanent atrial fibrillation (HCC)       Acid reflux  Weighted cholesterol   Plan:     Labs today    Continue same meds    Lift chair    Current Outpatient Medications   Medication Sig Dispense Refill    Cholecalciferol (VITAMIN D) 50 MCG (2000 UT) CAPS capsule Take 1 capsule by mouth three times a week 30 capsule 11    warfarin (COUMADIN) 4 MG tablet TAKE ONE TABLET BY MOUTH DAILY 90 tablet 5    omeprazole (PRILOSEC) 40 MG delayed release capsule TAKE ONE CAPSULE BY MOUTH DAILY 90 capsule 3    dilTIAZem (CARDIZEM CD) 240 MG extended release capsule TAKE ONE CAPSULE BY MOUTH DAILY 90 capsule 3    atorvastatin (LIPITOR) 20 MG tablet TAKE ONE TABLET BY MOUTH DAILY 90 tablet 3     No current facility-administered medications for this visit.

## 2022-07-06 DIAGNOSIS — I48.21 PERMANENT ATRIAL FIBRILLATION (HCC): ICD-10-CM

## 2022-07-06 DIAGNOSIS — R54 AGE-RELATED PHYSICAL DEBILITY: Primary | ICD-10-CM

## 2022-07-13 ENCOUNTER — TELEPHONE (OUTPATIENT)
Dept: FAMILY MEDICINE CLINIC | Age: 87
End: 2022-07-13

## 2022-07-14 NOTE — TELEPHONE ENCOUNTER
Spoke to the patient about labs. Some CKD and advised her to avoid NSAIDs. Says she uses Tylenol only. Lipids and blood count and lites are okay. Sugar is a little high in the prediabetic range but we will just continue to follow. The patient will stay on the same medicine and I asked her to see us in 6 months.

## 2022-07-19 ENCOUNTER — ANTI-COAG VISIT (OUTPATIENT)
Dept: PHARMACY | Age: 87
End: 2022-07-19
Payer: MEDICARE

## 2022-07-19 DIAGNOSIS — Z79.01 LONG TERM CURRENT USE OF ANTICOAGULANT THERAPY: Primary | ICD-10-CM

## 2022-07-19 DIAGNOSIS — I48.21 PERMANENT ATRIAL FIBRILLATION (HCC): ICD-10-CM

## 2022-07-19 LAB — INR BLD: 2.5

## 2022-07-19 PROCEDURE — 85610 PROTHROMBIN TIME: CPT

## 2022-07-19 PROCEDURE — 99211 OFF/OP EST MAY X REQ PHY/QHP: CPT

## 2022-08-16 ENCOUNTER — ANTI-COAG VISIT (OUTPATIENT)
Dept: PHARMACY | Age: 87
End: 2022-08-16
Payer: MEDICARE

## 2022-08-16 DIAGNOSIS — Z79.01 LONG TERM CURRENT USE OF ANTICOAGULANT THERAPY: Primary | ICD-10-CM

## 2022-08-16 DIAGNOSIS — I48.21 PERMANENT ATRIAL FIBRILLATION (HCC): ICD-10-CM

## 2022-08-16 LAB — INR BLD: 2.3

## 2022-08-16 PROCEDURE — 85610 PROTHROMBIN TIME: CPT

## 2022-08-16 PROCEDURE — 99211 OFF/OP EST MAY X REQ PHY/QHP: CPT

## 2022-08-16 RX ORDER — DILTIAZEM HYDROCHLORIDE 240 MG/1
CAPSULE, COATED, EXTENDED RELEASE ORAL
Qty: 90 CAPSULE | Refills: 3 | Status: SHIPPED | OUTPATIENT
Start: 2022-08-16

## 2022-08-16 NOTE — TELEPHONE ENCOUNTER
6/28/2022    Future Appointments   Date Time Provider Tracey Lambert   9/13/2022 11:30 AM [address]   9/28/2022  1:30 PM MD Breonna Chavez [facility]

## 2022-09-13 ENCOUNTER — ANTI-COAG VISIT (OUTPATIENT)
Dept: PHARMACY | Age: 87
End: 2022-09-13

## 2022-09-13 DIAGNOSIS — I48.21 PERMANENT ATRIAL FIBRILLATION (HCC): ICD-10-CM

## 2022-09-13 DIAGNOSIS — Z79.01 LONG TERM CURRENT USE OF ANTICOAGULANT THERAPY: Primary | ICD-10-CM

## 2022-09-13 LAB — INR BLD: 3.3

## 2022-09-13 NOTE — PROGRESS NOTES
Ms. Filomena Gusman is here for management of anticoagulation for Atrial Fibrillation. PMH also significant for HTN and HLD. She presents today w/out complaint. Pt verifies dosing regimen as listed above. Pt denies s/sx bleeding/bruising/swelling/SOB. Patient reports not taking any OTC/Herbals/RX. Reviewed dietary concerns. No ETOH or tobacco use. No changes with medications. Patient reports no changes. Off and on with eating greens. INR 3.3 is above the therapeutic range of 2-3. Recommend to hold dose today (9/13) then continue as usual with dose of 4 mg daily except for 2 mg on Mon/Fri starting tomorrow. Patient has 4 mg tablets. Will continue to monitor and check INR in 4 weeks. Dosing reminder card given with phone number, appointment date and time.    Return to clinic: 10/11 @ 11:30AM  Referring Physician: SILKE Khan, PharmD Candidate

## 2022-09-28 ENCOUNTER — OFFICE VISIT (OUTPATIENT)
Dept: CARDIOLOGY CLINIC | Age: 87
End: 2022-09-28
Payer: MEDICARE

## 2022-09-28 VITALS
SYSTOLIC BLOOD PRESSURE: 128 MMHG | OXYGEN SATURATION: 96 % | DIASTOLIC BLOOD PRESSURE: 82 MMHG | HEIGHT: 64 IN | HEART RATE: 87 BPM | WEIGHT: 146.2 LBS | BODY MASS INDEX: 24.96 KG/M2

## 2022-09-28 DIAGNOSIS — I48.21 PERMANENT ATRIAL FIBRILLATION (HCC): Primary | ICD-10-CM

## 2022-09-28 PROCEDURE — 1090F PRES/ABSN URINE INCON ASSESS: CPT | Performed by: INTERNAL MEDICINE

## 2022-09-28 PROCEDURE — G8417 CALC BMI ABV UP PARAM F/U: HCPCS | Performed by: INTERNAL MEDICINE

## 2022-09-28 PROCEDURE — G8427 DOCREV CUR MEDS BY ELIG CLIN: HCPCS | Performed by: INTERNAL MEDICINE

## 2022-09-28 PROCEDURE — 99214 OFFICE O/P EST MOD 30 MIN: CPT | Performed by: INTERNAL MEDICINE

## 2022-09-28 PROCEDURE — 1036F TOBACCO NON-USER: CPT | Performed by: INTERNAL MEDICINE

## 2022-09-28 PROCEDURE — 1123F ACP DISCUSS/DSCN MKR DOCD: CPT | Performed by: INTERNAL MEDICINE

## 2022-09-28 PROCEDURE — 93000 ELECTROCARDIOGRAM COMPLETE: CPT | Performed by: INTERNAL MEDICINE

## 2022-09-28 NOTE — PATIENT INSTRUCTIONS
Plan:  1. Continue taking coumadin and cardiac medications as prescribed.    2. Follow up with EPNP in 6 months and BARB in 1 year

## 2022-09-28 NOTE — PROGRESS NOTES
[address]   Cardiac Consultation    Date: 9/28/22  Patient Name: Rani Pascual  YOB: 1931    Primary Care Physician: Joon Olson DO    CHIEF COMPLAINT:   Chief Complaint   Patient presents with    Follow-up    Atrial Fibrillation         HPI:  Rani Pascual is a 80 y.o. female who presents today regarding permanent atrial fibrillation. She has a past medical history of HTN, HLD, Colon Cancer, and permanent AF. She was originally admitted in 6/2016 for right colectomy. While hospitalized she was found to have AF with RVR. She was started on coumadin, lopressor and diltiazem upon d/c. At following office visits in 7/2016 and 8/2016 she was noted to be in sinus bradycardia and her lopressor and diltiazem were decreased. She was noted to be in asymptomatic AF at subsequent office visits. Her metoprolol was stopped in 8/2017 d/t fatigue and dizziness. At her most recent office visit in 9/2019 she was noted to remain in AF with HR 86 BPM. Her [facility]in [facility]. On 9/28/2020 she reported she is feeling well. She reported she occasionally feels some racing heart beats at night or when sleeping. She reported she takes her coumadin at night and her diltiazem in the AM. She reported she is active, doing yard work around her home, and tolerates that well. Patient wore a cardiac event monitor from 09/28/2022 to 09/30/2022 which demonstrated predominately AF with an average HR of 76 (). PAC burden 0%, PVC burden 0.01%. Today, 09/28/2022, she states she is feeling well. She states that she goes to the [facility] coumadin clinic to manage her INR. She reports that her INR has not been less than 2. She states she used to walk 5 miles daily but has cut this down due to fear of falling. She states she is able to tolerate transcending and descending stairs with no shortness of breath or palpitations.  She states she is very active taking care of her home and garden. She states she is able to tolerate this activity well. She reports she occasionally feels a \"flutter. \" Patient denies current edema, chest pain, sob, dizziness or syncope. Patient is taking all cardiac medications as prescribed and tolerates them well. She denies any recent issues with bleeding or bruising. Past Medical History:   has a past medical history of Anemia, Atrial fibrillation ([facility] [address] Cancer ([facility] [address] Hyperlipidemia, Hypertension, and Skin cancer of forehead. Surgical History:   has a past surgical history that includes Appendectomy; Hysterectomy; Colonoscopy (5/18/16); Upper gastrointestinal endoscopy (5/18/16); fracture surgery; and Colon surgery (Right, 6/7/16). Social History:   reports that she has never smoked. She has never used smokeless tobacco. She reports that she does not drink alcohol and does not use drugs. Family History:  family history includes Breast Cancer in her mother; Cancer in her mother. Home Medications:  Outpatient Encounter Medications as of 9/28/2022   Medication Sig Dispense Refill    dilTIAZem (CARDIZEM CD) 240 MG extended release capsule TAKE ONE CAPSULE BY MOUTH DAILY 90 capsule 3    Lift Chair MISC by Does not apply route 1 each 0    Cholecalciferol (VITAMIN D) 50 MCG (2000 UT) CAPS capsule Take 1 capsule by mouth three times a week 30 capsule 11    warfarin (COUMADIN) 4 MG tablet TAKE ONE TABLET BY MOUTH DAILY 90 tablet 5    omeprazole (PRILOSEC) 40 MG delayed release capsule TAKE ONE CAPSULE BY MOUTH DAILY 90 capsule 3    atorvastatin (LIPITOR) 20 MG tablet TAKE ONE TABLET BY MOUTH DAILY 90 tablet 3     No facility-administered encounter medications on file as of 9/28/2022. Allergies:  Amoxicillin and Sulfa antibiotics     Review of Systems   Constitutional: Negative. HENT: Negative. Eyes: Negative. Respiratory: Negative. Cardiovascular: Negative. Gastrointestinal: Negative. Genitourinary: Negative.     Musculoskeletal: Negative. Skin: Negative. Neurological: Negative. Hematological: Negative. Psychiatric/Behavioral: Negative. /82   Pulse 87   Ht 5' 4\" (1.626 m)   Wt 146 lb 3.2 oz (66.3 kg)   SpO2 96%   BMI 25.10 kg/m²     Objective:  Physical Exam   Constitutional: She is oriented to person, place, and time. She appears well-developed and well-nourished. HENT:   Head: Normocephalic and atraumatic. Eyes: Pupils are equal, round, and reactive to light. Neck: Normal range of motion. Cardiovascular: Normal rate, irregular rhythm and irregular heart sounds. Pulmonary/Chest: Effort normal and breath sounds normal.   Abdominal: Soft. No tenderness. Musculoskeletal: Normal range of motion. She exhibits no edema. Neurological: She is alert and oriented to person, place, and time. Skin: Skin is warm and dry. Psychiatric: She has a normal mood and affect. Assessment:  1. Permanent Atrial Fibrillation - HR control with diltiazem, CVA protection with warfarin. 2. HTN  3. HLD    Plan:  1. Continue taking coumadin and cardiac medications as prescribed. 2. Follow up with EPNP in 6 months and JMB in 1 year    QUALITY MEASURES  1. Tobacco Cessation Counseling: NA  2. Retake of BP if >140/90:   NA  3. Documentation to PCP/referring for new patient:  Sent to PCP at close of office visit  4. CAD patient on anti-platelet: NA  5. CAD patient on STATIN therapy:  Yes  6. Patient with CHF and aFib on anticoagulation: YES, Coumadin    I, Karen Nunez RN, am scribing for and in the presence of Dr. Lamin Josue. 09/28/22 2:17 PM   Karen Nunez RN      I, Dr. Lamin Josue, personally performed the services described in this documentation as scribed by  Karen Nunez RN in my presence, and it is both accurate and complete.        Lamin Josue M.D.

## 2022-10-11 ENCOUNTER — ANTI-COAG VISIT (OUTPATIENT)
Dept: PHARMACY | Age: 87
End: 2022-10-11
Payer: MEDICARE

## 2022-10-11 DIAGNOSIS — I48.21 PERMANENT ATRIAL FIBRILLATION (HCC): ICD-10-CM

## 2022-10-11 DIAGNOSIS — Z79.01 LONG TERM CURRENT USE OF ANTICOAGULANT THERAPY: Primary | ICD-10-CM

## 2022-10-11 LAB — INR BLD: 2.9

## 2022-10-11 PROCEDURE — 99211 OFF/OP EST MAY X REQ PHY/QHP: CPT

## 2022-10-11 PROCEDURE — 85610 PROTHROMBIN TIME: CPT

## 2022-10-11 NOTE — PROGRESS NOTES
Ms. Jonathon Schwab is here for management of anticoagulation for Atrial Fibrillation. PMH also significant for HTN and HLD. She presents today w/out complaint. Pt verifies dosing regimen as listed above. Pt denies s/sx bleeding/bruising/swelling/SOB. Patient reports not taking any OTC/Herbals/RX. Reviewed dietary concerns. No ETOH or tobacco use. No changes with medications. Patient reports no changes. INR 2.9 is within the therapeutic range of 2-3. Recommend to continue as usual with dose of 4 mg daily except for 2 mg on Mon/Fri starting tomorrow. Patient has 4 mg tablets. Will continue to monitor and check INR in 4 weeks. Dosing reminder card given with phone number, appointment date and time.    Return to clinic:11/8/22 @ 11:30am  Referring Physician: SILKE LunaD Candidate  10/11/2022 11:26 AM

## 2022-11-08 ENCOUNTER — ANTI-COAG VISIT (OUTPATIENT)
Dept: PHARMACY | Age: 87
End: 2022-11-08
Payer: MEDICARE

## 2022-11-08 DIAGNOSIS — I48.21 PERMANENT ATRIAL FIBRILLATION (HCC): ICD-10-CM

## 2022-11-08 DIAGNOSIS — Z79.01 LONG TERM CURRENT USE OF ANTICOAGULANT THERAPY: Primary | ICD-10-CM

## 2022-11-08 LAB — INR BLD: 2.7

## 2022-11-08 PROCEDURE — 85610 PROTHROMBIN TIME: CPT

## 2022-11-08 PROCEDURE — 99211 OFF/OP EST MAY X REQ PHY/QHP: CPT

## 2022-11-08 NOTE — PROGRESS NOTES
Ms. Gen Frazier is here for management of anticoagulation for Atrial Fibrillation. PMH also significant for HTN and HLD. She presents today w/out complaint. Pt verifies dosing regimen as listed above. Pt denies s/sx bleeding/bruising/swelling/SOB. Patient reports not taking any OTC/Herbals/RX. Reviewed dietary concerns. No ETOH or tobacco use. No changes with medications. Patient reports no changes. INR 2.7 is within the therapeutic range of 2-3. Recommend to continue 4 mg daily except for 2 mg on Mon/Fri starting tomorrow. Patient has 4 mg tablets. Will continue to monitor and check INR in 4 weeks. Dosing reminder card given with phone number, appointment date and time.    Return to clinic:12/6/22 @ 11:30am  Referring Physician: Derick Blount NP

## 2022-12-06 ENCOUNTER — ANTI-COAG VISIT (OUTPATIENT)
Dept: PHARMACY | Age: 87
End: 2022-12-06
Payer: MEDICARE

## 2022-12-06 DIAGNOSIS — Z79.01 LONG TERM CURRENT USE OF ANTICOAGULANT THERAPY: Primary | ICD-10-CM

## 2022-12-06 DIAGNOSIS — I48.21 PERMANENT ATRIAL FIBRILLATION (HCC): ICD-10-CM

## 2022-12-06 LAB — INR BLD: 2.9

## 2022-12-06 PROCEDURE — 85610 PROTHROMBIN TIME: CPT

## 2022-12-06 PROCEDURE — 99211 OFF/OP EST MAY X REQ PHY/QHP: CPT

## 2022-12-06 NOTE — PROGRESS NOTES
Ms. Reny Nava is here for management of anticoagulation for Atrial Fibrillation. PMH also significant for HTN and HLD. She presents today w/out complaint. Pt verifies dosing regimen as listed above. Pt denies s/sx bleeding/bruising/swelling/SOB. Patient reports not taking any OTC/Herbals/RX. Reviewed dietary concerns. No ETOH or tobacco use. No changes with medications. Patient reports no changes. INR 2.9 is within the therapeutic range of 2-3. Recommend to continue 4 mg daily except for 2 mg on Mon/Fri. Patient has 4 mg tablets. Will continue to monitor and check INR in 4 weeks. Dosing reminder card given with phone number, appointment date and time.    Return to clinic:1/3/23 @ 11:30am  Referring Physician: Anshu Lee NP

## 2023-01-03 ENCOUNTER — ANTI-COAG VISIT (OUTPATIENT)
Dept: PHARMACY | Age: 88
End: 2023-01-03

## 2023-01-03 DIAGNOSIS — Z79.01 LONG TERM CURRENT USE OF ANTICOAGULANT THERAPY: Primary | ICD-10-CM

## 2023-01-03 DIAGNOSIS — I48.21 PERMANENT ATRIAL FIBRILLATION (HCC): ICD-10-CM

## 2023-01-03 LAB — INR BLD: 1.8

## 2023-01-03 NOTE — PROGRESS NOTES
Ms. Shirin Buchanan is here for management of anticoagulation for Atrial Fibrillation. PMH also significant for HTN and HLD. She presents today w/out complaint. Pt verifies dosing regimen as listed above. Pt denies s/sx bleeding/bruising/swelling/SOB. Patient reports not taking any OTC/Herbals/RX. Reviewed dietary concerns. No ETOH or tobacco use. No changes with medications. Patient reports no changes. INR 1.8 is within acceptable therapeutic range of 2-3. Recommend to continue 4 mg daily except for 2 mg on Mon/Fri. Patient has 4 mg tablets. Will continue to monitor and check INR in 4 weeks. Dosing reminder card given with phone number, appointment date and time.    Return to clinic:2/7/23 @ 11:30am  Referring Physician: Nic Lucas NP

## 2023-02-07 ENCOUNTER — ANTI-COAG VISIT (OUTPATIENT)
Dept: PHARMACY | Age: 88
End: 2023-02-07
Payer: MEDICARE

## 2023-02-07 DIAGNOSIS — Z79.01 LONG TERM CURRENT USE OF ANTICOAGULANT THERAPY: Primary | ICD-10-CM

## 2023-02-07 DIAGNOSIS — I48.21 PERMANENT ATRIAL FIBRILLATION (HCC): ICD-10-CM

## 2023-02-07 LAB — INR BLD: 2.1

## 2023-02-07 PROCEDURE — 85610 PROTHROMBIN TIME: CPT

## 2023-02-07 PROCEDURE — 99211 OFF/OP EST MAY X REQ PHY/QHP: CPT

## 2023-02-07 NOTE — PROGRESS NOTES
Ms. Sophia Aguirre is here for management of anticoagulation for Atrial Fibrillation. PMH also significant for HTN and HLD. She presents today w/out complaint. Pt verifies dosing regimen as listed above. Pt denies s/sx bleeding/bruising/swelling/SOB. Patient reports not taking any OTC/Herbals/RX. Reviewed dietary concerns. No ETOH or tobacco use. No changes with medications. Patient reports no changes. INR 2.1 is within acceptable therapeutic range of 2-3. Recommend to continue 4 mg daily except for 2 mg on Mon/Fri. Patient has 4 mg tablets. Will continue to monitor and check INR in 4 weeks. Dosing reminder card given with phone number, appointment date and time.    Return to clinic:3/7/23 @ 11:30am  Referring Physician: Ramírez Maurer NP

## 2023-03-07 ENCOUNTER — ANTI-COAG VISIT (OUTPATIENT)
Dept: PHARMACY | Age: 88
End: 2023-03-07
Payer: MEDICARE

## 2023-03-07 DIAGNOSIS — Z79.01 LONG TERM CURRENT USE OF ANTICOAGULANT THERAPY: Primary | ICD-10-CM

## 2023-03-07 DIAGNOSIS — I48.21 PERMANENT ATRIAL FIBRILLATION (HCC): ICD-10-CM

## 2023-03-07 LAB — INR BLD: 2.9

## 2023-03-07 PROCEDURE — 85610 PROTHROMBIN TIME: CPT

## 2023-03-07 PROCEDURE — 99211 OFF/OP EST MAY X REQ PHY/QHP: CPT

## 2023-03-07 NOTE — PROGRESS NOTES
Ms. Potter Client is here for management of anticoagulation for Atrial Fibrillation. PMH also significant for HTN and HLD. She presents today w/out complaint. Pt verifies dosing regimen as listed above. Pt denies s/sx bleeding/bruising/swelling/SOB. Patient reports not taking any OTC/Herbals/RX. Reviewed dietary concerns. No ETOH or tobacco use. No changes with medications. Patient reports no changes. INR 2.9 is within acceptable therapeutic range of 2-3. Recommend to continue 4 mg daily except for 2 mg on Mon/Fri. Patient has 4 mg tablets. Will continue to monitor and check INR in 4 weeks. Dosing reminder card given with phone number, appointment date and time.    Return to clinic: 4/3/23 @ 1300  Referring Physician: SILKE Olvera  PharmD Candidate 2023    For Pharmacy Admin Tracking Only    Intervention Detail:   Total # of Interventions Recommended: 0  Total # of Interventions Accepted: 0  Time Spent (min): 15

## 2023-03-29 NOTE — PROGRESS NOTES
[address]   Electrophysiology Outpatient Note              Date:  March 30, 2023  Patient name: Margy Telles  YOB: 1931    Primary Care physician: Therese Woods DO    HISTORY OF PRESENT ILLNESS: The patient is a 80 y.o.  female with a history of AF, HTN, HLD, colon cancer. In 2016, patient was admitted for right colectomy. While hospitalized she was found to have rapid atrial fibrillation. She was started on Coumadin, metoprolol and diltiazem. In follow-up she was noted to be bradycardic and Lopressor and diltiazem were decreased. She was found to be in asymptomatic atrial fibrillation at subsequent office visits. In 8/2017, metoprolol was stopped due to fatigue and dizziness. In 9/2022, patient wore a 48-hour monitor that showed predominantly rate controlled atrial fibrillation. Today she is being seen for AF. EKG shows AF with a HR of 94. Patient complains of some lower extremity swelling which is new for her. She is able to keep up with her usual activities but does feel like she has slowed down. Denies chest pain, palpitations, shortness of breath, and dizziness. Past Medical History:   has a past medical history of Anemia, Atrial fibrillation ([facility] [address] Cancer ([facility] [address] Hyperlipidemia, Hypertension, and Skin cancer of forehead. Past Surgical History:   has a past surgical history that includes Appendectomy; Hysterectomy; Colonoscopy (5/18/16); Upper gastrointestinal endoscopy (5/18/16); fracture surgery; and Colon surgery (Right, 6/7/16). Home Medications:    Prior to Admission medications    Medication Sig Start Date End Date Taking?  Authorizing Provider   dilTIAZem (CARDIZEM CD) 240 MG extended release capsule TAKE ONE CAPSULE BY MOUTH DAILY 8/16/22  Yes Therese Woods DO   Lift Chair [address] [facility] by Does not apply route 7/6/22  Yes Therese Wodos DO   Cholecalciferol (VITAMIN D) 50 MCG (2000 UT) CAPS capsule Take 1 capsule by mouth three times a week 6/29/22

## 2023-03-30 ENCOUNTER — OFFICE VISIT (OUTPATIENT)
Dept: CARDIOLOGY CLINIC | Age: 88
End: 2023-03-30
Payer: MEDICARE

## 2023-03-30 VITALS
DIASTOLIC BLOOD PRESSURE: 74 MMHG | WEIGHT: 145.6 LBS | SYSTOLIC BLOOD PRESSURE: 130 MMHG | HEART RATE: 73 BPM | OXYGEN SATURATION: 96 % | BODY MASS INDEX: 24.99 KG/M2

## 2023-03-30 DIAGNOSIS — I10 ESSENTIAL HYPERTENSION: ICD-10-CM

## 2023-03-30 DIAGNOSIS — I48.21 PERMANENT ATRIAL FIBRILLATION (HCC): Primary | ICD-10-CM

## 2023-03-30 PROCEDURE — 1036F TOBACCO NON-USER: CPT | Performed by: NURSE PRACTITIONER

## 2023-03-30 PROCEDURE — 99214 OFFICE O/P EST MOD 30 MIN: CPT | Performed by: NURSE PRACTITIONER

## 2023-03-30 PROCEDURE — G8484 FLU IMMUNIZE NO ADMIN: HCPCS | Performed by: NURSE PRACTITIONER

## 2023-03-30 PROCEDURE — 93000 ELECTROCARDIOGRAM COMPLETE: CPT | Performed by: NURSE PRACTITIONER

## 2023-03-30 PROCEDURE — 1090F PRES/ABSN URINE INCON ASSESS: CPT | Performed by: NURSE PRACTITIONER

## 2023-03-30 PROCEDURE — G8420 CALC BMI NORM PARAMETERS: HCPCS | Performed by: NURSE PRACTITIONER

## 2023-03-30 PROCEDURE — G8427 DOCREV CUR MEDS BY ELIG CLIN: HCPCS | Performed by: NURSE PRACTITIONER

## 2023-03-30 PROCEDURE — 1123F ACP DISCUSS/DSCN MKR DOCD: CPT | Performed by: NURSE PRACTITIONER

## 2023-03-30 NOTE — PATIENT INSTRUCTIONS
No changes today     Echocardiogram due to swelling. Please call [phone] to schedule.      Follow up in 6 months or sooner if needed

## 2023-04-03 ENCOUNTER — ANTI-COAG VISIT (OUTPATIENT)
Dept: PHARMACY | Age: 88
End: 2023-04-03
Payer: MEDICARE

## 2023-04-03 DIAGNOSIS — I48.21 PERMANENT ATRIAL FIBRILLATION (HCC): ICD-10-CM

## 2023-04-03 DIAGNOSIS — Z79.01 LONG TERM CURRENT USE OF ANTICOAGULANT THERAPY: Primary | ICD-10-CM

## 2023-04-03 LAB — INTERNATIONAL NORMALIZATION RATIO, POC: 3.3

## 2023-04-03 PROCEDURE — 85610 PROTHROMBIN TIME: CPT | Performed by: INTERNAL MEDICINE

## 2023-04-03 PROCEDURE — 99211 OFF/OP EST MAY X REQ PHY/QHP: CPT | Performed by: INTERNAL MEDICINE

## 2023-04-03 NOTE — PROGRESS NOTES
Ms. Sherryle Cera is here for management of anticoagulation for Atrial Fibrillation. PMH also significant for HTN and HLD. She presents today w/out complaint. Pt verifies dosing regimen as listed above. Pt denies s/sx bleeding/bruising/swelling/SOB. Patient reports not taking any OTC/Herbals/RX. Reviewed dietary concerns. No ETOH or tobacco use. No changes with medications. Patient reports no changes, except a little less vit k lately. Encouraged her to aim for 3-4 servings per week, since she would rather eat more veggies than change her dose. INR 3.3 is above acceptable therapeutic range of 2-3. Recommend to hold today then continue 4 mg daily except for 2 mg on Mon/Fri. Patient has 4 mg tablets. Will continue to monitor and check INR in 3 weeks. Dosing reminder card given with phone number, appointment date and time.    Return to clinic: 4/24/23 @ [phone]  Referring Physician: Kimberly Armas NP

## 2023-04-11 DIAGNOSIS — E78.2 MIXED HYPERLIPIDEMIA: ICD-10-CM

## 2023-04-11 DIAGNOSIS — E55.9 VITAMIN D DEFICIENCY: ICD-10-CM

## 2023-04-11 DIAGNOSIS — I48.21 PERMANENT ATRIAL FIBRILLATION (HCC): ICD-10-CM

## 2023-04-11 DIAGNOSIS — I10 ESSENTIAL HYPERTENSION: ICD-10-CM

## 2023-04-11 LAB
25(OH)D3 SERPL-MCNC: 48.1 NG/ML
BASOPHILS # BLD: 0 K/UL (ref 0–0.2)
BASOPHILS NFR BLD: 0.8 %
DEPRECATED RDW RBC AUTO: 14 % (ref 12.4–15.4)
EOSINOPHIL # BLD: 0.1 K/UL (ref 0–0.6)
EOSINOPHIL NFR BLD: 1 %
HCT VFR BLD AUTO: 42.7 % (ref 36–48)
HGB BLD-MCNC: 14.3 G/DL (ref 12–16)
LYMPHOCYTES # BLD: 1.3 K/UL (ref 1–5.1)
LYMPHOCYTES NFR BLD: 24.5 %
MCH RBC QN AUTO: 33.1 PG (ref 26–34)
MCHC RBC AUTO-ENTMCNC: 33.4 G/DL (ref 31–36)
MCV RBC AUTO: 99.2 FL (ref 80–100)
MONOCYTES # BLD: 0.6 K/UL (ref 0–1.3)
MONOCYTES NFR BLD: 11 %
NEUTROPHILS # BLD: 3.3 K/UL (ref 1.7–7.7)
NEUTROPHILS NFR BLD: 62.7 %
PLATELET # BLD AUTO: 201 K/UL (ref 135–450)
PMV BLD AUTO: 9.8 FL (ref 5–10.5)
RBC # BLD AUTO: 4.3 M/UL (ref 4–5.2)
WBC # BLD AUTO: 5.3 K/UL (ref 4–11)

## 2023-04-12 LAB
ALBUMIN SERPL-MCNC: 4.2 G/DL (ref 3.4–5)
ALBUMIN/GLOB SERPL: 1.4 {RATIO} (ref 1.1–2.2)
ALP SERPL-CCNC: 111 U/L (ref 40–129)
ALT SERPL-CCNC: 17 U/L (ref 10–40)
ANION GAP SERPL CALCULATED.3IONS-SCNC: 14 MMOL/L (ref 3–16)
AST SERPL-CCNC: 19 U/L (ref 15–37)
BILIRUB SERPL-MCNC: 0.6 MG/DL (ref 0–1)
BUN SERPL-MCNC: 17 MG/DL (ref 7–20)
CALCIUM SERPL-MCNC: 9.3 MG/DL (ref 8.3–10.6)
CHLORIDE SERPL-SCNC: 103 MMOL/L (ref 99–110)
CHOLEST SERPL-MCNC: 195 MG/DL (ref 0–199)
CO2 SERPL-SCNC: 27 MMOL/L (ref 21–32)
CREAT SERPL-MCNC: 1.1 MG/DL (ref 0.6–1.2)
GFR SERPLBLD CREATININE-BSD FMLA CKD-EPI: 47 ML/MIN/{1.73_M2}
GLUCOSE SERPL-MCNC: 136 MG/DL (ref 70–99)
HDLC SERPL-MCNC: 64 MG/DL (ref 40–60)
LDLC SERPL CALC-MCNC: 110 MG/DL
POTASSIUM SERPL-SCNC: 3.9 MMOL/L (ref 3.5–5.1)
PROT SERPL-MCNC: 7.1 G/DL (ref 6.4–8.2)
SODIUM SERPL-SCNC: 144 MMOL/L (ref 136–145)
TRIGL SERPL-MCNC: 105 MG/DL (ref 0–150)
TSH SERPL DL<=0.005 MIU/L-ACNC: 3.35 UIU/ML (ref 0.27–4.2)
VLDLC SERPL CALC-MCNC: 21 MG/DL

## 2023-04-24 ENCOUNTER — ANTI-COAG VISIT (OUTPATIENT)
Dept: PHARMACY | Age: 88
End: 2023-04-24
Payer: MEDICARE

## 2023-04-24 DIAGNOSIS — I48.21 PERMANENT ATRIAL FIBRILLATION (HCC): ICD-10-CM

## 2023-04-24 DIAGNOSIS — Z79.01 LONG TERM CURRENT USE OF ANTICOAGULANT THERAPY: Primary | ICD-10-CM

## 2023-04-24 LAB — INTERNATIONAL NORMALIZATION RATIO, POC: 2.2

## 2023-04-24 PROCEDURE — 85610 PROTHROMBIN TIME: CPT | Performed by: INTERNAL MEDICINE

## 2023-04-24 PROCEDURE — 99211 OFF/OP EST MAY X REQ PHY/QHP: CPT | Performed by: INTERNAL MEDICINE

## 2023-04-24 NOTE — PROGRESS NOTES
Ms. Pearl Ordaz is here for management of anticoagulation for Atrial Fibrillation. PMH also significant for HTN and HLD. She presents today w/out complaint. Pt verifies dosing regimen as listed above. Pt denies s/sx bleeding/bruising/swelling/SOB. Patient reports not taking any OTC/Herbals/RX. Reviewed dietary concerns. No ETOH or tobacco use. No changes with medications. INR 2.2 is within acceptable therapeutic range of 2-3. Recommend to continue 4 mg daily except for 2 mg on Mon/Fri. Patient has 4 mg tablets. Will continue to monitor and check INR in 4 weeks. Dosing reminder card given with phone number, appointment date and time.    Return to clinic: 5/22 @ [phone]  Referring Physician: Natalie Josue, [address] Only    Total # of Interventions Recommended: 0  Total # of Interventions Accepted: 0  Time Spent (min): 15

## 2023-05-17 ENCOUNTER — HOSPITAL ENCOUNTER (OUTPATIENT)
Dept: CARDIOLOGY | Age: 88
Discharge: HOME | End: 2023-05-17
Payer: MEDICARE

## 2023-05-17 LAB
LV EF: 55 %
LVEF MODALITY: NORMAL

## 2023-05-17 PROCEDURE — 93306 TTE W/DOPPLER COMPLETE: CPT

## 2023-05-22 ENCOUNTER — ANTI-COAG VISIT (OUTPATIENT)
Dept: PHARMACY | Age: 88
End: 2023-05-22
Payer: MEDICARE

## 2023-05-22 DIAGNOSIS — Z79.01 LONG TERM CURRENT USE OF ANTICOAGULANT THERAPY: Primary | ICD-10-CM

## 2023-05-22 DIAGNOSIS — I48.21 PERMANENT ATRIAL FIBRILLATION (HCC): ICD-10-CM

## 2023-05-22 LAB — INTERNATIONAL NORMALIZATION RATIO, POC: 2.2

## 2023-05-22 PROCEDURE — 99211 OFF/OP EST MAY X REQ PHY/QHP: CPT | Performed by: INTERNAL MEDICINE

## 2023-05-22 PROCEDURE — 85610 PROTHROMBIN TIME: CPT | Performed by: INTERNAL MEDICINE

## 2023-05-22 NOTE — PROGRESS NOTES
Ms. Tj Kahn is here for management of anticoagulation for Atrial Fibrillation. PMH also significant for HTN and HLD. She presents today w/out complaint. Pt verifies dosing regimen as listed above. Pt denies s/sx bleeding/bruising/swelling/SOB. Patient reports not taking any OTC/Herbals/RX. Reviewed dietary concerns- she states that she has been eating more salads lately. No ETOH or tobacco use. No changes with medications. INR 2.2 is within acceptable therapeutic range of 2-3. Recommend to continue 4 mg daily except for 2 mg on Mon/Fri. Patient has 4 mg tablets. Will continue to monitor and check INR in 4 weeks. Dosing reminder card given with phone number, appointment date and time.    Return to clinic: 6/19 @ [phone]  Referring Physician: Soto Sam NP    For Pharmacy Admin Tracking Only    Total # of Interventions Recommended: 0  Total # of Interventions Accepted: 0  Time Spent (min): 15

## 2023-06-19 ENCOUNTER — ANTI-COAG VISIT (OUTPATIENT)
Dept: PHARMACY | Age: 88
End: 2023-06-19
Payer: MEDICARE

## 2023-06-19 DIAGNOSIS — I48.21 PERMANENT ATRIAL FIBRILLATION (HCC): ICD-10-CM

## 2023-06-19 DIAGNOSIS — Z79.01 LONG TERM CURRENT USE OF ANTICOAGULANT THERAPY: Primary | ICD-10-CM

## 2023-06-19 LAB — INR BLD: 2.4

## 2023-06-19 PROCEDURE — 99211 OFF/OP EST MAY X REQ PHY/QHP: CPT | Performed by: INTERNAL MEDICINE

## 2023-06-19 PROCEDURE — 85610 PROTHROMBIN TIME: CPT | Performed by: INTERNAL MEDICINE

## 2023-06-19 NOTE — PROGRESS NOTES
Ms. Felisa Coulter is here for management of anticoagulation for Atrial Fibrillation. PMH also significant for HTN and HLD. She presents today w/out complaint. Pt verifies dosing regimen as listed above. Pt denies s/sx bleeding/bruising/swelling/SOB. Patient reports not taking any OTC/Herbals/RX. Reviewed dietary concerns- she states that she has been eating more salads lately. No ETOH or tobacco use. No changes per patient. INR 2.4 is within acceptable therapeutic range of 2-3. Recommend to continue 4 mg daily except for 2 mg on Mon/Fri. Patient has 4 mg tablets. Will continue to monitor and check INR in 4 weeks. Dosing reminder card given with phone number, appointment date and time. Return to clinic: 7/17 @ [phone]  Referring Physician: SILKE Almaraz PharmD candidate     I have seen the patient and reviewed the progress note written by the PharmD Candidate. I agree with this assessment and plan.    Jenae Chavez, [address] [place] [address], PharmD 6/19/2023 1:51 PM    For Pharmacy Admin Tracking Only      Total # of Interventions Recommended: 1  Total # of Interventions Accepted: 0  Time Spent (min): 15

## 2023-07-17 ENCOUNTER — ANTI-COAG VISIT (OUTPATIENT)
Dept: PHARMACY | Age: 88
End: 2023-07-17
Payer: MEDICARE

## 2023-07-17 DIAGNOSIS — I48.21 PERMANENT ATRIAL FIBRILLATION (HCC): ICD-10-CM

## 2023-07-17 DIAGNOSIS — Z79.01 LONG TERM CURRENT USE OF ANTICOAGULANT THERAPY: Primary | ICD-10-CM

## 2023-07-17 LAB — INR BLD: 2.1

## 2023-07-17 PROCEDURE — 99211 OFF/OP EST MAY X REQ PHY/QHP: CPT | Performed by: INTERNAL MEDICINE

## 2023-07-17 PROCEDURE — 85610 PROTHROMBIN TIME: CPT | Performed by: INTERNAL MEDICINE

## 2023-07-17 NOTE — PROGRESS NOTES
Ms. Seble Wiley is here for management of anticoagulation for Atrial Fibrillation. PMH also significant for HTN and HLD. She presents today w/out complaint. Pt verifies dosing regimen as listed above. Pt denies s/sx bleeding/bruising/swelling/SOB. Patient reports not taking any OTC/Herbals/RX. Reviewed dietary concerns- she states that she has been eating more salads lately. No ETOH or tobacco use. No changes per patient. INR 2.1  is within acceptable therapeutic range of 2-3. Recommend to continue 4 mg daily except for 2 mg on Mon/Fri. Patient has 4 mg tablets. Will continue to monitor and check INR in 4 weeks. Dosing reminder card given with phone number, appointment date and time. Return to clinic:  8/14/23 @ 11:30 AM  Referring Physician: Eleanora Leer, NP Griselda Ke PharmD candidate     I have seen the patient and reviewed the progress note written by the PharmD Candidate. I agree with this assessment and plan.    Mike Brannon, [facility], PharmD 7/17/2023 11:49 AM    For Pharmacy Admin Tracking Only    Total # of Interventions Recommended: 0  Total # of Interventions Accepted: 0  Time Spent (min): 15

## 2023-07-26 ENCOUNTER — TELEPHONE (OUTPATIENT)
Dept: FAMILY MEDICINE CLINIC | Age: 88
End: 2023-07-26

## 2023-07-26 NOTE — TELEPHONE ENCOUNTER
Patient stopped in office to request handicap placard  prescription. She requested it to be mailed to her if available. Or she did state it would be ok to come pick it up. Please advise when ready.

## 2023-07-28 NOTE — TELEPHONE ENCOUNTER
LM for pt to stop by office to  placard - placed up front for . If patient would rather get it mailed to her, she is to call back.

## 2023-08-11 DIAGNOSIS — I48.21 PERMANENT ATRIAL FIBRILLATION (HCC): ICD-10-CM

## 2023-08-11 RX ORDER — ATORVASTATIN CALCIUM 20 MG/1
TABLET, FILM COATED ORAL
Qty: 90 TABLET | Refills: 3 | Status: SHIPPED | OUTPATIENT
Start: 2023-08-11

## 2023-08-11 RX ORDER — WARFARIN SODIUM 4 MG/1
TABLET ORAL
Qty: 90 TABLET | Refills: 5 | Status: SHIPPED | OUTPATIENT
Start: 2023-08-11

## 2023-08-11 RX ORDER — OMEPRAZOLE 40 MG/1
CAPSULE, DELAYED RELEASE ORAL
Qty: 90 CAPSULE | Refills: 3 | Status: SHIPPED | OUTPATIENT
Start: 2023-08-11

## 2023-08-11 RX ORDER — DILTIAZEM HYDROCHLORIDE 240 MG/1
CAPSULE, COATED, EXTENDED RELEASE ORAL
Qty: 90 CAPSULE | Refills: 3 | Status: SHIPPED | OUTPATIENT
Start: 2023-08-11

## 2023-08-11 RX ORDER — CHOLECALCIFEROL (VITAMIN D3) 125 MCG
CAPSULE ORAL
Qty: 30 CAPSULE | Refills: 2 | Status: SHIPPED | OUTPATIENT
Start: 2023-08-11

## 2023-08-11 NOTE — TELEPHONE ENCOUNTER
Future Appointments   Date Time Provider [address]   8/14/2023 11:30 AM Padmini HEWITT   10/25/2023  1:00 PM CLAIRE Werner - MICKY PERES     LOV 4/11/2023

## 2023-08-14 ENCOUNTER — ANTI-COAG VISIT (OUTPATIENT)
Dept: PHARMACY | Age: 88
End: 2023-08-14
Payer: MEDICARE

## 2023-08-14 DIAGNOSIS — Z79.01 LONG TERM CURRENT USE OF ANTICOAGULANT THERAPY: Primary | ICD-10-CM

## 2023-08-14 DIAGNOSIS — I48.21 PERMANENT ATRIAL FIBRILLATION (HCC): ICD-10-CM

## 2023-08-14 LAB — INTERNATIONAL NORMALIZATION RATIO, POC: 2.4

## 2023-08-14 PROCEDURE — 99211 OFF/OP EST MAY X REQ PHY/QHP: CPT | Performed by: INTERNAL MEDICINE

## 2023-08-14 PROCEDURE — 85610 PROTHROMBIN TIME: CPT | Performed by: INTERNAL MEDICINE

## 2023-08-14 NOTE — PROGRESS NOTES
Ms. Christen Barry is here for management of anticoagulation for Atrial Fibrillation. PMH also significant for HTN and HLD. She presents today w/out complaint. Pt verifies dosing regimen as listed above. Pt denies s/sx bleeding/bruising/swelling/SOB. Patient reports not taking any OTC/Herbals/RX. Reviewed dietary concerns- she states that she has been eating more salads lately. No ETOH or tobacco use. No changes per patient. INR 2.4 is within acceptable therapeutic range of 2-3. Recommend to continue 4 mg daily except for 2 mg on Mon/Fri. Patient has 4 mg tablets. Will continue to monitor and check INR in 4 weeks. Dosing reminder card given with phone number, appointment date and time.    Return to clinic:  23 @ 11:45 AM  Referring Physician: Nick Snider NP    For Pharmacy Admin Tracking Only    Intervention Detail: Adherence Monitorin  Total # of Interventions Recommended: 1  Total # of Interventions Accepted: 1  Time Spent (min): 15

## 2023-09-11 ENCOUNTER — ANTI-COAG VISIT (OUTPATIENT)
Dept: PHARMACY | Age: 88
End: 2023-09-11
Payer: MEDICARE

## 2023-09-11 DIAGNOSIS — I48.21 PERMANENT ATRIAL FIBRILLATION (HCC): ICD-10-CM

## 2023-09-11 DIAGNOSIS — Z79.01 LONG TERM CURRENT USE OF ANTICOAGULANT THERAPY: Primary | ICD-10-CM

## 2023-09-11 LAB — INTERNATIONAL NORMALIZATION RATIO, POC: 2.5

## 2023-09-11 PROCEDURE — 85610 PROTHROMBIN TIME: CPT | Performed by: INTERNAL MEDICINE

## 2023-09-11 PROCEDURE — 99211 OFF/OP EST MAY X REQ PHY/QHP: CPT | Performed by: INTERNAL MEDICINE

## 2023-09-11 NOTE — PROGRESS NOTES
Ms. Dago Lundberg is here for management of anticoagulation for Atrial Fibrillation. PMH also significant for HTN and HLD. She presents today w/out complaint. Pt verifies dosing regimen as listed above. Pt denies s/sx bleeding/bruising/swelling/SOB. Patient reports not taking any OTC/Herbals/RX. Reviewed dietary concerns- she states that she has been eating more salads lately. No ETOH or tobacco use. No changes per patient. INR 2.5 is within acceptable therapeutic range of 2-3. Recommend to continue 4 mg daily except for 2 mg on Mon/Fri. Patient has 4 mg tablets. Will continue to monitor and check INR in 4 weeks. Dosing reminder card given with phone number, appointment date and time.    Return to clinic:  10/9/23 @ 11:30 AM  Referring Physician: Nikky Coronel NP    For Pharmacy Admin Tracking Only    Intervention Detail: Adherence Monitorin  Total # of Interventions Recommended: 1  Total # of Interventions Accepted: 1  Time Spent (min): 15

## 2023-10-09 ENCOUNTER — ANTI-COAG VISIT (OUTPATIENT)
Dept: PHARMACY | Age: 88
End: 2023-10-09
Payer: MEDICARE

## 2023-10-09 DIAGNOSIS — I48.21 PERMANENT ATRIAL FIBRILLATION (HCC): ICD-10-CM

## 2023-10-09 DIAGNOSIS — Z79.01 LONG TERM CURRENT USE OF ANTICOAGULANT THERAPY: Primary | ICD-10-CM

## 2023-10-09 LAB — INR BLD: 2.8

## 2023-10-09 PROCEDURE — 85610 PROTHROMBIN TIME: CPT

## 2023-10-09 PROCEDURE — 99211 OFF/OP EST MAY X REQ PHY/QHP: CPT

## 2023-10-09 NOTE — PROGRESS NOTES
Ms. Fina Urbina is here for management of anticoagulation for Atrial Fibrillation. PMH also significant for HTN and HLD. She presents today w/out complaint. Pt verifies dosing regimen as listed above. Pt denies s/sx bleeding/bruising/swelling/SOB. Patient reports not taking any OTC/Herbals/RX. Reviewed dietary concerns- she states that she has been eating more salads lately. No ETOH or tobacco use. No changes per patient. INR 2.8 is within acceptable therapeutic range of 2-3. Recommend to continue 4 mg daily except for 2 mg on Mon/Fri. Patient has 4 mg tablets. Will continue to monitor and check INR in 4 weeks. Dosing reminder card given with phone number, appointment date and time.    Return to clinic:  11/6/23 @ 11:30 AM  Referring Physician: SILKE Bautista Do, PharmD 10/9/23  11:28 AM

## 2023-10-25 ENCOUNTER — OFFICE VISIT (OUTPATIENT)
Dept: CARDIOLOGY CLINIC | Age: 88
End: 2023-10-25
Payer: MEDICARE

## 2023-10-25 VITALS
HEART RATE: 80 BPM | DIASTOLIC BLOOD PRESSURE: 72 MMHG | WEIGHT: 134 LBS | OXYGEN SATURATION: 96 % | HEIGHT: 61 IN | SYSTOLIC BLOOD PRESSURE: 120 MMHG | BODY MASS INDEX: 25.3 KG/M2

## 2023-10-25 DIAGNOSIS — I10 ESSENTIAL HYPERTENSION: ICD-10-CM

## 2023-10-25 DIAGNOSIS — I48.21 PERMANENT ATRIAL FIBRILLATION (HCC): Primary | ICD-10-CM

## 2023-10-25 PROCEDURE — G8484 FLU IMMUNIZE NO ADMIN: HCPCS | Performed by: NURSE PRACTITIONER

## 2023-10-25 PROCEDURE — G8417 CALC BMI ABV UP PARAM F/U: HCPCS | Performed by: NURSE PRACTITIONER

## 2023-10-25 PROCEDURE — G8427 DOCREV CUR MEDS BY ELIG CLIN: HCPCS | Performed by: NURSE PRACTITIONER

## 2023-10-25 PROCEDURE — 1036F TOBACCO NON-USER: CPT | Performed by: NURSE PRACTITIONER

## 2023-10-25 PROCEDURE — 93000 ELECTROCARDIOGRAM COMPLETE: CPT | Performed by: NURSE PRACTITIONER

## 2023-10-25 PROCEDURE — 1123F ACP DISCUSS/DSCN MKR DOCD: CPT | Performed by: NURSE PRACTITIONER

## 2023-10-25 PROCEDURE — 99214 OFFICE O/P EST MOD 30 MIN: CPT | Performed by: NURSE PRACTITIONER

## 2023-10-25 PROCEDURE — 1090F PRES/ABSN URINE INCON ASSESS: CPT | Performed by: NURSE PRACTITIONER

## 2023-10-25 NOTE — PROGRESS NOTES
systolic function with an estimated ejection fraction   of 55%. No regional wall motion abnormalities are seen. Left ventricular diastolic filling pressure are indeterminate. The left atrium is severely dilated. .   The ascending aorta is mildly dilated at 4.0 cm. The aortic valve is thickened/calcified with decreased leaflet mobility. The aortic valve area is calculated at 1.08 cm2 with max velocity of 2.22   m/sec, a maximum pressure gradient of 20 mmHg and a mean pressure gradient   of 13 mmHg. This is c/w mild aortic stenosis. Mild mitral and tricuspid regurgitation. Systolic pulmonary artery pressure (SPAP) is normal and estimated at 29 mmHg   (right atrial pressure 3 mmHg). Irregular heart rate throughout the study. All labs and testing reviewed. CARDIOLOGY LABS:   CBC: No results for input(s): \"WBC\", \"HGB\", \"HCT\", \"PLT\" in the last 72 hours. BMP: No results for input(s): \"NA\", \"K\", \"CO2\", \"BUN\", \"CREATININE\", \"LABGLOM\", \"GLUCOSE\" in the last 72 hours. PT/INR: No results for input(s): \"PROTIME\", \"INR\" in the last 72 hours. APTT:No results for input(s): \"APTT\" in the last 72 hours. FASTING LIPID PANEL:  Lab Results   Component Value Date/Time    HDL 64 04/11/2023 11:11 AM    LDLCALC 110 04/11/2023 11:11 AM    TRIG 105 04/11/2023 11:11 AM     LIVER PROFILE:No results for input(s): \"AST\", \"ALT\", \"ALB\" in the last 72 hours. IMPRESSION:    Patient Active Problem List   Diagnosis    Hyperlipemia    Malignant neoplasm of ascending colon (HCC)    Anemia    Urinary retention    Hypernatremia    Hypokalemia    Acute blood loss anemia    Permanent atrial fibrillation (HCC)    Essential hypertension    Long term current use of anticoagulant therapy       Assessment:  Permanent atrial fibrillation: stable, asymptomatic   -[id number] score 4 (age, gender, HTN)   HTN: controlled   HLD  Mild AS on echo 5/2023       Plan:   1. Continue Coumadin and Cardizem  2. Annual labs due 4/2024  3.  Monitor BP

## 2023-11-01 ENCOUNTER — OFFICE VISIT (OUTPATIENT)
Dept: FAMILY MEDICINE CLINIC | Age: 88
End: 2023-11-01

## 2023-11-01 VITALS
SYSTOLIC BLOOD PRESSURE: 130 MMHG | HEART RATE: 58 BPM | BODY MASS INDEX: 25.18 KG/M2 | RESPIRATION RATE: 16 BRPM | DIASTOLIC BLOOD PRESSURE: 60 MMHG | WEIGHT: 132 LBS | OXYGEN SATURATION: 97 %

## 2023-11-01 DIAGNOSIS — R73.01 ELEVATED FASTING GLUCOSE: ICD-10-CM

## 2023-11-01 DIAGNOSIS — R63.4 WEIGHT LOSS: ICD-10-CM

## 2023-11-01 DIAGNOSIS — I48.21 PERMANENT ATRIAL FIBRILLATION (HCC): ICD-10-CM

## 2023-11-01 DIAGNOSIS — I10 ESSENTIAL HYPERTENSION: Primary | ICD-10-CM

## 2023-11-01 DIAGNOSIS — E78.2 MIXED HYPERLIPIDEMIA: ICD-10-CM

## 2023-11-01 DIAGNOSIS — Z23 NEEDS FLU SHOT: ICD-10-CM

## 2023-11-01 DIAGNOSIS — I10 ESSENTIAL HYPERTENSION: ICD-10-CM

## 2023-11-01 RX ORDER — OMEPRAZOLE 40 MG/1
40 CAPSULE, DELAYED RELEASE ORAL DAILY
Qty: 90 CAPSULE | Refills: 3 | Status: SHIPPED | OUTPATIENT
Start: 2023-11-01

## 2023-11-01 RX ORDER — ATORVASTATIN CALCIUM 20 MG/1
20 TABLET, FILM COATED ORAL DAILY
Qty: 90 TABLET | Refills: 3 | Status: SHIPPED | OUTPATIENT
Start: 2023-11-01

## 2023-11-01 RX ORDER — WARFARIN SODIUM 4 MG/1
4 TABLET ORAL DAILY
Qty: 90 TABLET | Refills: 5 | Status: SHIPPED | OUTPATIENT
Start: 2023-11-01

## 2023-11-01 RX ORDER — DILTIAZEM HYDROCHLORIDE 240 MG/1
240 CAPSULE, COATED, EXTENDED RELEASE ORAL DAILY
Qty: 90 CAPSULE | Refills: 3 | Status: SHIPPED | OUTPATIENT
Start: 2023-11-01

## 2023-11-01 RX ORDER — CHOLECALCIFEROL (VITAMIN D3) 125 MCG
CAPSULE ORAL
Qty: 90 CAPSULE | Refills: 2 | Status: SHIPPED | OUTPATIENT
Start: 2023-11-01

## 2023-11-01 SDOH — ECONOMIC STABILITY: FOOD INSECURITY: WITHIN THE PAST 12 MONTHS, THE FOOD YOU BOUGHT JUST DIDN'T LAST AND YOU DIDN'T HAVE MONEY TO GET MORE.: NEVER TRUE

## 2023-11-01 SDOH — ECONOMIC STABILITY: FOOD INSECURITY: WITHIN THE PAST 12 MONTHS, YOU WORRIED THAT YOUR FOOD WOULD RUN OUT BEFORE YOU GOT MONEY TO BUY MORE.: NEVER TRUE

## 2023-11-01 SDOH — ECONOMIC STABILITY: INCOME INSECURITY: HOW HARD IS IT FOR YOU TO PAY FOR THE VERY BASICS LIKE FOOD, HOUSING, MEDICAL CARE, AND HEATING?: NOT HARD AT ALL

## 2023-11-01 ASSESSMENT — ENCOUNTER SYMPTOMS
COUGH: 0
CONSTIPATION: 0
SHORTNESS OF BREATH: 0
BLOOD IN STOOL: 0
DIARRHEA: 0

## 2023-11-01 ASSESSMENT — PATIENT HEALTH QUESTIONNAIRE - PHQ9
2. FEELING DOWN, DEPRESSED OR HOPELESS: 0
SUM OF ALL RESPONSES TO PHQ QUESTIONS 1-9: 0
1. LITTLE INTEREST OR PLEASURE IN DOING THINGS: 0
SUM OF ALL RESPONSES TO PHQ QUESTIONS 1-9: 0
SUM OF ALL RESPONSES TO PHQ9 QUESTIONS 1 & 2: 0

## 2023-11-01 NOTE — PATIENT INSTRUCTIONS
"Patient comes into ER with complaints of chest pain since this morning. Patient states that he has had chest pain on and off for the past month but it hasn't been severe like today. Patient states that he did smoke marijuana for the first time in awhile last night with some "young guys" and states it didn't taste like normal marijuana.    " Continue the current medication    Check with us in 3-4 days for the blood result

## 2023-11-01 NOTE — PROGRESS NOTES
Subjective:      Patient ID: Cole Carrier is a 80 y.o. y.o. female. MEDICATION F/U  Says been OK  Stable  No sig changes    Heart check up current  They monitor warfarin. HPI      Chief Complaint   Patient presents with    6 Month follow up       Allergies   Allergen Reactions    Amoxicillin Hives     Questionable allergy      Sulfa Antibiotics Hives       Past Medical History:   Diagnosis Date    Anemia 2016    Atrial fibrillation ([address]) 2006    RESOLVED, ONE INCIDENT    Cancer ([address]) 2016    colon cancer    Hyperlipidemia     Hypertension     HAS BEEN RUNNING NORMAL    Skin cancer of forehead 11/2021       Past Surgical History:   Procedure Laterality Date    APPENDECTOMY      COLON SURGERY Right 6/7/16    Robotic Assisted Laparoscopic Right Colectomy    COLONOSCOPY  5/18/16    ascending colon mass, biopsy    FRACTURE SURGERY      Right ORIF ANKLE    HYSTERECTOMY (CERVIX STATUS UNKNOWN)      BSO    UPPER GASTROINTESTINAL ENDOSCOPY  5/18/16    biopsy gastric       Social History     Socioeconomic History    Marital status:       Spouse name: Not on file    Number of children: Not on file    Years of education: Not on file    Highest education level: Not on file   Occupational History    Not on file   Tobacco Use    Smoking status: Never    Smokeless tobacco: Never   Vaping Use    Vaping Use: Never used   Substance and Sexual Activity    Alcohol use: No     Alcohol/week: 0.0 standard drinks of alcohol    Drug use: No    Sexual activity: Not Currently   Other Topics Concern    Not on file   Social History Narrative    Not on file     Social Determinants of Health     Financial Resource Strain: Low Risk  (11/1/2023)    Overall Financial Resource Strain (CARDIA)     Difficulty of Paying Living Expenses: Not hard at all   Food Insecurity: No Food Insecurity (11/1/2023)    Hunger Vital Sign     Worried About Running Out of Food in the Last Year: Never true     Ran Out of Food in the Last Year: Never true

## 2023-11-02 LAB
ALBUMIN SERPL-MCNC: 4.5 G/DL (ref 3.4–5)
ALBUMIN/GLOB SERPL: 1.6 {RATIO} (ref 1.1–2.2)
ALP SERPL-CCNC: 111 U/L (ref 40–129)
ALT SERPL-CCNC: 16 U/L (ref 10–40)
ANION GAP SERPL CALCULATED.3IONS-SCNC: 14 MMOL/L (ref 3–16)
AST SERPL-CCNC: 20 U/L (ref 15–37)
BASOPHILS # BLD: 0 K/UL (ref 0–0.2)
BASOPHILS NFR BLD: 0.9 %
BILIRUB SERPL-MCNC: 0.7 MG/DL (ref 0–1)
BUN SERPL-MCNC: 16 MG/DL (ref 7–20)
CALCIUM SERPL-MCNC: 9.7 MG/DL (ref 8.3–10.6)
CHLORIDE SERPL-SCNC: 104 MMOL/L (ref 99–110)
CO2 SERPL-SCNC: 24 MMOL/L (ref 21–32)
CREAT SERPL-MCNC: 1.2 MG/DL (ref 0.6–1.2)
DEPRECATED RDW RBC AUTO: 13.8 % (ref 12.4–15.4)
EOSINOPHIL # BLD: 0 K/UL (ref 0–0.6)
EOSINOPHIL NFR BLD: 0.6 %
EST. AVERAGE GLUCOSE BLD GHB EST-MCNC: 137 MG/DL
GFR SERPLBLD CREATININE-BSD FMLA CKD-EPI: 42 ML/MIN/{1.73_M2}
GLUCOSE SERPL-MCNC: 125 MG/DL (ref 70–99)
HBA1C MFR BLD: 6.4 %
HCT VFR BLD AUTO: 44.3 % (ref 36–48)
HGB BLD-MCNC: 15.2 G/DL (ref 12–16)
LYMPHOCYTES # BLD: 1.3 K/UL (ref 1–5.1)
LYMPHOCYTES NFR BLD: 25.3 %
MCH RBC QN AUTO: 34.2 PG (ref 26–34)
MCHC RBC AUTO-ENTMCNC: 34.3 G/DL (ref 31–36)
MCV RBC AUTO: 99.7 FL (ref 80–100)
MONOCYTES # BLD: 0.5 K/UL (ref 0–1.3)
MONOCYTES NFR BLD: 9.8 %
NEUTROPHILS # BLD: 3.4 K/UL (ref 1.7–7.7)
NEUTROPHILS NFR BLD: 63.4 %
PLATELET # BLD AUTO: 199 K/UL (ref 135–450)
PMV BLD AUTO: 10.4 FL (ref 5–10.5)
POTASSIUM SERPL-SCNC: 3.6 MMOL/L (ref 3.5–5.1)
PROT SERPL-MCNC: 7.4 G/DL (ref 6.4–8.2)
RBC # BLD AUTO: 4.44 M/UL (ref 4–5.2)
SODIUM SERPL-SCNC: 142 MMOL/L (ref 136–145)
WBC # BLD AUTO: 5.3 K/UL (ref 4–11)

## 2023-11-05 NOTE — RESULT ENCOUNTER NOTE
Pls tell pt her blood counts is good.  Sugar control and numbers are fine at her age.  Kidney function is a bit slow due to age but not vastly changing.  Avoid taking ibuprofen or aleve type medications.  Tylenol is ok  Follow up in the spring please or sooner if you do not feel well.

## 2023-11-06 ENCOUNTER — ANTI-COAG VISIT (OUTPATIENT)
Dept: PHARMACY | Age: 88
End: 2023-11-06
Payer: MEDICARE

## 2023-11-06 DIAGNOSIS — I48.21 PERMANENT ATRIAL FIBRILLATION (HCC): ICD-10-CM

## 2023-11-06 DIAGNOSIS — Z79.01 LONG TERM CURRENT USE OF ANTICOAGULANT THERAPY: Primary | ICD-10-CM

## 2023-11-06 LAB — INTERNATIONAL NORMALIZATION RATIO, POC: 2.8

## 2023-11-06 PROCEDURE — 85610 PROTHROMBIN TIME: CPT | Performed by: INTERNAL MEDICINE

## 2023-11-06 PROCEDURE — 99211 OFF/OP EST MAY X REQ PHY/QHP: CPT | Performed by: INTERNAL MEDICINE

## 2023-11-06 NOTE — PROGRESS NOTES
Ms. Hasmukh Lowry is here for management of anticoagulation for Atrial Fibrillation. PMH also significant for HTN and HLD. She presents today w/out complaint. Pt verifies dosing regimen as listed above. Pt denies s/sx bleeding/bruising/swelling/SOB. Patient reports not taking any OTC/Herbals/RX. Reviewed dietary concerns- she states that she has been eating more salads lately. No ETOH or tobacco use. No changes per patient. INR 2.8 is within acceptable therapeutic range of 2-3. Recommend to continue 4 mg daily except for 2 mg on Mon/Fri. Patient has 4 mg tablets. Will continue to monitor and check INR in 5 weeks (prefers 5 weeks if therapeutic). Dosing reminder card given with phone number, appointment date and time.    Return to clinic:  23 @ 11:30 AM  Referring Physician: Agustina Macedo NP    For Pharmacy Admin Tracking Only    Intervention Detail: Adherence Monitorin  Total # of Interventions Recommended: 1  Total # of Interventions Accepted: 1  Time Spent (min): 15

## 2023-12-11 ENCOUNTER — ANTI-COAG VISIT (OUTPATIENT)
Dept: PHARMACY | Age: 88
End: 2023-12-11
Payer: MEDICARE

## 2023-12-11 DIAGNOSIS — Z79.01 LONG TERM CURRENT USE OF ANTICOAGULANT THERAPY: Primary | ICD-10-CM

## 2023-12-11 DIAGNOSIS — I48.21 PERMANENT ATRIAL FIBRILLATION (HCC): ICD-10-CM

## 2023-12-11 LAB — INR BLD: 2.4

## 2023-12-11 PROCEDURE — 99211 OFF/OP EST MAY X REQ PHY/QHP: CPT | Performed by: INTERNAL MEDICINE

## 2023-12-11 PROCEDURE — 85610 PROTHROMBIN TIME: CPT | Performed by: INTERNAL MEDICINE

## 2023-12-11 NOTE — PROGRESS NOTES
Ms. Kceia Davis is here for management of anticoagulation for Atrial Fibrillation. PMH also significant for HTN and HLD. She presents today w/out complaint. Pt verifies dosing regimen as listed above. Pt denies s/sx bleeding/bruising/swelling/SOB. Patient reports not taking any OTC/Herbals/RX. Reviewed dietary concerns- she states that she has been eating more salads lately. No ETOH or tobacco use. Pt reports one missed dose 2-3 weeks ago  No other changes per patient. INR 2.4 is within acceptable therapeutic range of 2-3. Recommend to continue 4 mg daily except for 2 mg on Mon/Fri. Patient has 4 mg tablets. Will continue to monitor and check INR in 5 weeks (prefers 5 weeks if therapeutic). Dosing reminder card given with phone number, appointment date and time.    Return to clinic:  1/15/23 @ 11:30 AM  Referring Physician: SILKE Hamilton, PharmD Student     For Pharmacy Admin Tracking Only    Intervention Detail: Adherence Monitorin  Total # of Interventions Recommended: 1  Total # of Interventions Accepted: 1  Time Spent (min): 15

## 2024-01-15 ENCOUNTER — ANTI-COAG VISIT (OUTPATIENT)
Dept: PHARMACY | Age: 89
End: 2024-01-15
Payer: MEDICARE

## 2024-01-15 DIAGNOSIS — Z79.01 LONG TERM CURRENT USE OF ANTICOAGULANT THERAPY: Primary | ICD-10-CM

## 2024-01-15 DIAGNOSIS — I48.21 PERMANENT ATRIAL FIBRILLATION (HCC): ICD-10-CM

## 2024-01-15 LAB — INTERNATIONAL NORMALIZATION RATIO, POC: 3.1

## 2024-01-15 PROCEDURE — 99211 OFF/OP EST MAY X REQ PHY/QHP: CPT | Performed by: INTERNAL MEDICINE

## 2024-01-15 PROCEDURE — 85610 PROTHROMBIN TIME: CPT | Performed by: INTERNAL MEDICINE

## 2024-01-15 NOTE — PROGRESS NOTES
Ms. De León is here for management of anticoagulation for Atrial Fibrillation.   PMH also significant for HTN and HLD.   She presents today w/out complaint.  Pt verifies dosing regimen as listed above.   Pt denies s/sx bleeding/bruising/swelling/SOB.  Patient reports not taking any OTC/Herbals/RX.   Reviewed dietary concerns- she states that she has been eating more salads lately.   No ETOH or tobacco use.    Not as many greens lately.    INR 3.1 is within acceptable therapeutic range of 2-3. We will eat more greens this month.   Recommend to continue 4 mg daily except for 2 mg on Mon/Fri.  Patient has 4 mg tablets.  Will continue to monitor and check INR in 5 weeks (prefers 5 weeks if therapeutic).   Dosing reminder card given with phone number, appointment date and time.   Return to clinic:  23 @ 11:30 AM  Referring Physician: Kimi Sy NP    For Pharmacy Admin Tracking Only    Intervention Detail: Adherence Monitorin  Total # of Interventions Recommended: 1  Total # of Interventions Accepted: 1  Time Spent (min): 15

## 2024-02-19 ENCOUNTER — ANTI-COAG VISIT (OUTPATIENT)
Dept: PHARMACY | Age: 89
End: 2024-02-19
Payer: MEDICARE

## 2024-02-19 DIAGNOSIS — I48.21 PERMANENT ATRIAL FIBRILLATION (HCC): ICD-10-CM

## 2024-02-19 DIAGNOSIS — Z79.01 LONG TERM CURRENT USE OF ANTICOAGULANT THERAPY: Primary | ICD-10-CM

## 2024-02-19 LAB — INTERNATIONAL NORMALIZATION RATIO, POC: 3.3

## 2024-02-19 PROCEDURE — 99211 OFF/OP EST MAY X REQ PHY/QHP: CPT | Performed by: INTERNAL MEDICINE

## 2024-02-19 PROCEDURE — 85610 PROTHROMBIN TIME: CPT | Performed by: INTERNAL MEDICINE

## 2024-02-19 NOTE — PROGRESS NOTES
Ms. De León is here for management of anticoagulation for Atrial Fibrillation.   PMH also significant for HTN and HLD.   She presents today w/out complaint.  Pt verifies dosing regimen as listed above.   Pt denies s/sx bleeding/bruising/swelling/SOB.  Patient reports not taking any OTC/Herbals/RX.   Reviewed dietary concerns- she states that she has been eating more salads lately.   No ETOH or tobacco use.    Not as many greens again, but this is the second month in a row with a high INR, so we will decrease her dose. She will eat something green tonight to help bring down her INR.    INR 3.3 is above acceptable therapeutic range of 2-3.    Recommend to reduce dose to 4 mg daily except for 2 mg on Mon/Wed/Fri.  Patient has 4 mg tablets.  Will continue to monitor and check INR in 5 weeks (prefers 5 weeks if therapeutic).   Dosing reminder card given with phone number, appointment date and time.   Return to clinic:  3/25 @ 11:30 AM    Referring Physician: Kimi Sy NP  Referral date- 23    For Pharmacy Admin Tracking Only    Intervention Detail: Adherence Monitorin and Dose Adjustment: 1, reason: Therapy De-escalation  Total # of Interventions Recommended: 2  Total # of Interventions Accepted: 2  Time Spent (min): 15

## 2024-03-25 ENCOUNTER — ANTI-COAG VISIT (OUTPATIENT)
Dept: PHARMACY | Age: 89
End: 2024-03-25
Payer: MEDICARE

## 2024-03-25 DIAGNOSIS — I48.21 PERMANENT ATRIAL FIBRILLATION (HCC): ICD-10-CM

## 2024-03-25 DIAGNOSIS — Z79.01 LONG TERM CURRENT USE OF ANTICOAGULANT THERAPY: Primary | ICD-10-CM

## 2024-03-25 LAB — INTERNATIONAL NORMALIZATION RATIO, POC: 3.1

## 2024-03-25 PROCEDURE — 99211 OFF/OP EST MAY X REQ PHY/QHP: CPT | Performed by: INTERNAL MEDICINE

## 2024-03-25 PROCEDURE — 85610 PROTHROMBIN TIME: CPT | Performed by: INTERNAL MEDICINE

## 2024-03-25 NOTE — PROGRESS NOTES
Ms. De León is here for management of anticoagulation for Atrial Fibrillation.   PMH also significant for HTN and HLD.   She presents today w/out complaint.  Pt verifies dosing regimen as listed above.   Pt denies s/sx bleeding/bruising/swelling/SOB.  Patient reports not taking any OTC/Herbals/RX.   Reviewed dietary concerns- she states that she has been eating more salads lately.   No ETOH or tobacco use.    Pt mentioned wanting to go to a lab and having her MD manage it because the lab is closer. She will talk to Dr. Black and let us know her decision, she will make an appt in April still.     INR 3.1 is slightly above acceptable therapeutic range of 2-3.    Recommend to reduce dose to 2 mg daily except for 4 mg on Sun/e/Thu.  Patient has 4 mg tablets.  Will continue to monitor and check INR in 5 weeks (prefers 5 weeks if therapeutic).   Dosing reminder card given with phone number, appointment date and time.   Return to clinic:   @ 11:30 AM    Referring Physician: Kimi Sy NP  Referral date- 23    For Pharmacy Admin Tracking Only    Intervention Detail: Adherence Monitorin and Dose Adjustment: 1, reason: Therapy De-escalation  Total # of Interventions Recommended: 2  Total # of Interventions Accepted: 2  Time Spent (min): 15

## 2024-03-26 NOTE — PROGRESS NOTES
[facility]   Electrophysiology Outpatient Note              Date:  April 15, 2024  Patient name: Zeinab De León  YOB: 1931    Primary Care physician: Fidel Black DO    HISTORY OF PRESENT ILLNESS: The patient is a 89 y.o.  female with a history of AF, HTN, HLD, colon cancer.    In 2016, patient was admitted for right colectomy.  While hospitalized she was found to have rapid atrial fibrillation.  She was started on Coumadin, metoprolol and diltiazem.  In follow-up she was noted to be bradycardic and Lopressor and diltiazem were decreased.  She was found to be in asymptomatic atrial fibrillation at subsequent office visits.  In 8/2017, metoprolol was stopped due to fatigue and dizziness.  In 9/2022, patient wore a 48-hour monitor that showed predominantly rate controlled atrial fibrillation. In 3/2023, she complained of ankle edema. In 5/2023, echo showed aortic stenosis.  Today she is being seen for AF. EKG shows AF with a HR of 86. She is feeling well. She does her own yard and house work. She has trace bilateral ankle edema. She complains of waking several times at night to urinate. Denies dysuria. Denies chest pain, palpitations, shortness of breath, and dizziness.           Past Medical History:   has a past medical history of Anemia, Atrial fibrillation (HCC), Cancer (HCC), Hyperlipidemia, Hypertension, and Skin cancer of forehead.    Past Surgical History:   has a past surgical history that includes Appendectomy; Hysterectomy; Colonoscopy (5/18/16); Upper gastrointestinal endoscopy (5/18/16); fracture surgery; and Colon surgery (Right, 6/7/16).     Home Medications:    Prior to Admission medications    Medication Sig Start Date End Date Taking? Authorizing Provider   vitamin D (D3 HIGH POTENCY) 50 MCG (2000 UT) CAPS capsule TAKE ONE CAPSULE BY MOUTH THREE TIMES A WEEK 11/1/23  Yes Fidel Black DO   warfarin (COUMADIN) 4 MG tablet Take 1 tablet by mouth daily 11/1/23

## 2024-04-15 ENCOUNTER — OFFICE VISIT (OUTPATIENT)
Dept: CARDIOLOGY CLINIC | Age: 89
End: 2024-04-15
Payer: MEDICARE

## 2024-04-15 VITALS
OXYGEN SATURATION: 99 % | HEART RATE: 87 BPM | SYSTOLIC BLOOD PRESSURE: 126 MMHG | WEIGHT: 132.2 LBS | HEIGHT: 61 IN | BODY MASS INDEX: 24.96 KG/M2 | DIASTOLIC BLOOD PRESSURE: 72 MMHG

## 2024-04-15 DIAGNOSIS — I10 ESSENTIAL HYPERTENSION: ICD-10-CM

## 2024-04-15 DIAGNOSIS — I35.0 AORTIC VALVE STENOSIS, ETIOLOGY OF CARDIAC VALVE DISEASE UNSPECIFIED: ICD-10-CM

## 2024-04-15 DIAGNOSIS — I48.21 PERMANENT ATRIAL FIBRILLATION (HCC): Primary | ICD-10-CM

## 2024-04-15 PROCEDURE — 1036F TOBACCO NON-USER: CPT | Performed by: NURSE PRACTITIONER

## 2024-04-15 PROCEDURE — 99214 OFFICE O/P EST MOD 30 MIN: CPT | Performed by: NURSE PRACTITIONER

## 2024-04-15 PROCEDURE — 1090F PRES/ABSN URINE INCON ASSESS: CPT | Performed by: NURSE PRACTITIONER

## 2024-04-15 PROCEDURE — 1123F ACP DISCUSS/DSCN MKR DOCD: CPT | Performed by: NURSE PRACTITIONER

## 2024-04-15 PROCEDURE — G8417 CALC BMI ABV UP PARAM F/U: HCPCS | Performed by: NURSE PRACTITIONER

## 2024-04-15 PROCEDURE — G8427 DOCREV CUR MEDS BY ELIG CLIN: HCPCS | Performed by: NURSE PRACTITIONER

## 2024-04-15 PROCEDURE — 93000 ELECTROCARDIOGRAM COMPLETE: CPT | Performed by: NURSE PRACTITIONER

## 2024-04-15 NOTE — PATIENT INSTRUCTIONS
NO changes today     Continue current medications    Continue compression stockings and elevating legs when at rest    Follow up in 6 months with Dr. Aburto

## 2024-04-29 ENCOUNTER — ANTI-COAG VISIT (OUTPATIENT)
Dept: PHARMACY | Age: 89
End: 2024-04-29
Payer: MEDICARE

## 2024-04-29 DIAGNOSIS — Z79.01 LONG TERM CURRENT USE OF ANTICOAGULANT THERAPY: Primary | ICD-10-CM

## 2024-04-29 DIAGNOSIS — I48.21 PERMANENT ATRIAL FIBRILLATION (HCC): ICD-10-CM

## 2024-04-29 LAB — INTERNATIONAL NORMALIZATION RATIO, POC: 1.7

## 2024-04-29 PROCEDURE — 99211 OFF/OP EST MAY X REQ PHY/QHP: CPT | Performed by: INTERNAL MEDICINE

## 2024-04-29 PROCEDURE — 85610 PROTHROMBIN TIME: CPT | Performed by: INTERNAL MEDICINE

## 2024-04-29 NOTE — PROGRESS NOTES
Ms. De León is here for management of anticoagulation for Atrial Fibrillation.   PMH also significant for HTN and HLD.   She presents today w/out complaint.  Pt verifies dosing regimen as listed above.   Pt denies s/sx bleeding/bruising/swelling/SOB.  Patient reports not taking any OTC/Herbals/RX.   Reviewed dietary concerns- she states that she has been eating more salads lately.   No ETOH or tobacco use.    Pt mentioned wanting to go to a lab and having her MD manage it because the lab is closer. She will talk to Dr. Black whenever she sees him next.    INR 1.7 is slightly below acceptable therapeutic range of 2-3.    Recommend to increase dose to 4 mg daily except for 2 mg on Mon/Wed/Fri.  Patient has 4 mg tablets.  Will continue to monitor and check INR in 5 weeks (prefers 5 weeks if therapeutic).   Dosing reminder card given with phone number, appointment date and time.   Return to clinic:  6/3 @ 11:30 AM    Referring Physician: Dr. Fidel Black  CPA signed  Referral date- 23    For Pharmacy Admin Tracking Only    Intervention Detail: Adherence Monitorin and Dose Adjustment: 1, reason: Therapy De-escalation  Total # of Interventions Recommended: 2  Total # of Interventions Accepted: 2  Time Spent (min): 15

## 2024-06-03 ENCOUNTER — ANTI-COAG VISIT (OUTPATIENT)
Dept: PHARMACY | Age: 89
End: 2024-06-03
Payer: MEDICARE

## 2024-06-03 DIAGNOSIS — Z79.01 LONG TERM CURRENT USE OF ANTICOAGULANT THERAPY: Primary | ICD-10-CM

## 2024-06-03 DIAGNOSIS — I48.21 PERMANENT ATRIAL FIBRILLATION (HCC): ICD-10-CM

## 2024-06-03 LAB — INTERNATIONAL NORMALIZATION RATIO, POC: 2.1

## 2024-06-03 PROCEDURE — 85610 PROTHROMBIN TIME: CPT

## 2024-06-03 PROCEDURE — 99211 OFF/OP EST MAY X REQ PHY/QHP: CPT

## 2024-06-03 NOTE — PROGRESS NOTES
Ms. De León is here for management of anticoagulation for Atrial Fibrillation.   PMH also significant for HTN and HLD.   She presents today w/out complaint.  Pt verifies dosing regimen as listed above.   Pt denies s/sx bleeding/bruising/swelling/SOB.  Patient reports not taking any OTC/Herbals/RX.   Reviewed dietary concerns- she states that she has been eating more salads lately.   No ETOH or tobacco use.    Pt mentioned wanting to go to a lab and having her MD manage it because the lab is closer. She will talk to Dr. Black whenever she sees him next.    INR 2.1 is within the acceptable therapeutic range of 2-3.    Recommend to continue dose of 4 mg daily except for 2 mg on Mon/Wed/Fri.  Patient has 4 mg tablets.  Will continue to monitor and check INR in 5 weeks (prefers 5 weeks if therapeutic).   Dosing reminder card given with phone number, appointment date and time.   Return to clinic:   @ 11:30 AM    Referring Physician: Dr. Fidel Black  CPA signed  Referral date- 23    Bob Carrasco, PharmD  6/3/2024 at 3:00 PM    For Pharmacy Admin Tracking Only    Intervention Detail: Adherence Monitorin  Total # of Interventions Recommended: 1  Total # of Interventions Accepted: 1  Time Spent (min): 15

## 2024-06-04 ENCOUNTER — TELEPHONE (OUTPATIENT)
Dept: FAMILY MEDICINE CLINIC | Age: 89
End: 2024-06-04

## 2024-06-04 DIAGNOSIS — E78.2 MIXED HYPERLIPIDEMIA: Primary | ICD-10-CM

## 2024-06-04 DIAGNOSIS — Z00.00 MEDICARE ANNUAL WELLNESS VISIT, SUBSEQUENT: ICD-10-CM

## 2024-06-04 DIAGNOSIS — I10 ESSENTIAL HYPERTENSION: ICD-10-CM

## 2024-06-04 NOTE — TELEPHONE ENCOUNTER
Patient asking for Dr. Black to place lab orders before her appointment that is scheduled on 6-20-24.  Future Appointments   Date Time Provider Department Center   6/20/2024 10:20 AM Fidel Black DO [facility] [place] [facility]   7/8/2024 11:30 AM [facility]   10/9/2024 11:30 AM Gil Aburto MD [facility]     Patient would like to have her labs drawn before her appointment.   Please call patient when lab orders are placed.

## 2024-06-18 DIAGNOSIS — I10 ESSENTIAL HYPERTENSION: ICD-10-CM

## 2024-06-18 DIAGNOSIS — E78.2 MIXED HYPERLIPIDEMIA: ICD-10-CM

## 2024-06-18 DIAGNOSIS — Z00.00 MEDICARE ANNUAL WELLNESS VISIT, SUBSEQUENT: ICD-10-CM

## 2024-06-18 LAB
ALBUMIN SERPL-MCNC: 4.2 G/DL (ref 3.4–5)
ALBUMIN/GLOB SERPL: 1.6 {RATIO} (ref 1.1–2.2)
ALP SERPL-CCNC: 124 U/L (ref 40–129)
ALT SERPL-CCNC: 14 U/L (ref 10–40)
ANION GAP SERPL CALCULATED.3IONS-SCNC: 15 MMOL/L (ref 3–16)
AST SERPL-CCNC: 17 U/L (ref 15–37)
BASOPHILS # BLD: 0.1 K/UL (ref 0–0.2)
BASOPHILS NFR BLD: 1.3 %
BILIRUB SERPL-MCNC: 0.6 MG/DL (ref 0–1)
BUN SERPL-MCNC: 20 MG/DL (ref 7–20)
CALCIUM SERPL-MCNC: 9.4 MG/DL (ref 8.3–10.6)
CHLORIDE SERPL-SCNC: 106 MMOL/L (ref 99–110)
CHOLEST SERPL-MCNC: 179 MG/DL (ref 0–199)
CO2 SERPL-SCNC: 23 MMOL/L (ref 21–32)
CREAT SERPL-MCNC: 1.2 MG/DL (ref 0.6–1.2)
DEPRECATED RDW RBC AUTO: 13.8 % (ref 12.4–15.4)
EOSINOPHIL # BLD: 0.1 K/UL (ref 0–0.6)
EOSINOPHIL NFR BLD: 1.2 %
GFR SERPLBLD CREATININE-BSD FMLA CKD-EPI: 42 ML/MIN/{1.73_M2}
GLUCOSE SERPL-MCNC: 134 MG/DL (ref 70–99)
HCT VFR BLD AUTO: 40.9 % (ref 36–48)
HDLC SERPL-MCNC: 63 MG/DL (ref 40–60)
HGB BLD-MCNC: 13.9 G/DL (ref 12–16)
LDLC SERPL CALC-MCNC: 96 MG/DL
LYMPHOCYTES # BLD: 1.4 K/UL (ref 1–5.1)
LYMPHOCYTES NFR BLD: 28.2 %
MCH RBC QN AUTO: 33.6 PG (ref 26–34)
MCHC RBC AUTO-ENTMCNC: 34.1 G/DL (ref 31–36)
MCV RBC AUTO: 98.6 FL (ref 80–100)
MONOCYTES # BLD: 0.5 K/UL (ref 0–1.3)
MONOCYTES NFR BLD: 10.8 %
NEUTROPHILS # BLD: 2.9 K/UL (ref 1.7–7.7)
NEUTROPHILS NFR BLD: 58.5 %
PLATELET # BLD AUTO: 178 K/UL (ref 135–450)
PMV BLD AUTO: 10.2 FL (ref 5–10.5)
POTASSIUM SERPL-SCNC: 3.8 MMOL/L (ref 3.5–5.1)
PROT SERPL-MCNC: 6.9 G/DL (ref 6.4–8.2)
RBC # BLD AUTO: 4.15 M/UL (ref 4–5.2)
SODIUM SERPL-SCNC: 144 MMOL/L (ref 136–145)
TRIGL SERPL-MCNC: 100 MG/DL (ref 0–150)
TSH SERPL DL<=0.005 MIU/L-ACNC: 2.81 UIU/ML (ref 0.27–4.2)
VLDLC SERPL CALC-MCNC: 20 MG/DL
WBC # BLD AUTO: 5 K/UL (ref 4–11)

## 2024-06-20 ENCOUNTER — OFFICE VISIT (OUTPATIENT)
Dept: FAMILY MEDICINE CLINIC | Age: 89
End: 2024-06-20
Payer: MEDICARE

## 2024-06-20 VITALS
OXYGEN SATURATION: 94 % | SYSTOLIC BLOOD PRESSURE: 152 MMHG | BODY MASS INDEX: 25.87 KG/M2 | HEART RATE: 94 BPM | HEIGHT: 60 IN | DIASTOLIC BLOOD PRESSURE: 88 MMHG | WEIGHT: 131.8 LBS

## 2024-06-20 DIAGNOSIS — I48.21 PERMANENT ATRIAL FIBRILLATION (HCC): ICD-10-CM

## 2024-06-20 DIAGNOSIS — I10 ESSENTIAL HYPERTENSION: Primary | ICD-10-CM

## 2024-06-20 DIAGNOSIS — D68.69 SECONDARY HYPERCOAGULABLE STATE (HCC): ICD-10-CM

## 2024-06-20 PROCEDURE — G8417 CALC BMI ABV UP PARAM F/U: HCPCS | Performed by: FAMILY MEDICINE

## 2024-06-20 PROCEDURE — 1123F ACP DISCUSS/DSCN MKR DOCD: CPT | Performed by: FAMILY MEDICINE

## 2024-06-20 PROCEDURE — 1090F PRES/ABSN URINE INCON ASSESS: CPT | Performed by: FAMILY MEDICINE

## 2024-06-20 PROCEDURE — G8427 DOCREV CUR MEDS BY ELIG CLIN: HCPCS | Performed by: FAMILY MEDICINE

## 2024-06-20 PROCEDURE — 1036F TOBACCO NON-USER: CPT | Performed by: FAMILY MEDICINE

## 2024-06-20 PROCEDURE — 99214 OFFICE O/P EST MOD 30 MIN: CPT | Performed by: FAMILY MEDICINE

## 2024-06-20 ASSESSMENT — PATIENT HEALTH QUESTIONNAIRE - PHQ9
SUM OF ALL RESPONSES TO PHQ9 QUESTIONS 1 & 2: 0
SUM OF ALL RESPONSES TO PHQ QUESTIONS 1-9: 0
2. FEELING DOWN, DEPRESSED OR HOPELESS: NOT AT ALL
SUM OF ALL RESPONSES TO PHQ QUESTIONS 1-9: 0
1. LITTLE INTEREST OR PLEASURE IN DOING THINGS: NOT AT ALL

## 2024-06-20 ASSESSMENT — LIFESTYLE VARIABLES
HOW OFTEN DO YOU HAVE A DRINK CONTAINING ALCOHOL: NEVER
HOW MANY STANDARD DRINKS CONTAINING ALCOHOL DO YOU HAVE ON A TYPICAL DAY: PATIENT DOES NOT DRINK

## 2024-06-20 NOTE — PATIENT INSTRUCTIONS
Continue the same medications    Continue the follow up with cardiology    Please see me in 6 months or so      Can use topical  Xylocaine / Lidocaine  for lower back occasionally

## 2024-06-20 NOTE — PROGRESS NOTES
Financial Resource Strain (CARDIA)     Difficulty of Paying Living Expenses: Not hard at all   Food Insecurity: Not on file (11/1/2023)   Transportation Needs: Unknown (11/1/2023)    PRAPARE - Transportation     Lack of Transportation (Medical): Not on file     Lack of Transportation (Non-Medical): No   Physical Activity: Insufficiently Active (6/20/2024)    Exercise Vital Sign     Days of Exercise per Week: 3 days     Minutes of Exercise per Session: 10 min   Stress: Not on file   Social Connections: Not on file   Intimate Partner Violence: Not on file   Housing Stability: Unknown (11/1/2023)    Housing Stability Vital Sign     Unable to Pay for Housing in the Last Year: Not on file     Number of Places Lived in the Last Year: Not on file     Unstable Housing in the Last Year: No       Family History   Problem Relation Age of Onset    Cancer Mother         breast    Breast Cancer Mother        Vitals:    06/20/24 1033   BP: (!) 152/88   Pulse: 94   SpO2:        Wt Readings from Last 3 Encounters:   06/20/24 59.8 kg (131 lb 12.8 oz)   04/15/24 60 kg (132 lb 3.2 oz)   11/01/23 59.9 kg (132 lb)       Review of Systems   Constitutional:  Negative for activity change and unexpected weight change.   HENT:  Negative for trouble swallowing.    Respiratory:          Sleeps semi-reclined in her chair    Some pedal edema-  less overnight.    No orthopnea   Cardiovascular:  Negative for chest pain and palpitations.        Really no chest sx.-  ch a-fib   Gastrointestinal:  Negative for blood in stool, constipation and diarrhea.   Genitourinary:  Negative for dysuria and frequency.   Musculoskeletal:         Low back aches some.  Bending andyard work bothers.  TYlenol not great    On warfarin.   Neurological:  Negative for seizures, facial asymmetry, speech difficulty and numbness.   Psychiatric/Behavioral:  Negative for dysphoric mood, self-injury and suicidal ideas.        Objective:   Physical Exam  Constitutional:

## 2024-06-28 PROBLEM — D68.69 SECONDARY HYPERCOAGULABLE STATE (HCC): Status: ACTIVE | Noted: 2024-06-28

## 2024-06-28 ASSESSMENT — ENCOUNTER SYMPTOMS: TROUBLE SWALLOWING: 0

## 2024-07-08 ENCOUNTER — ANTI-COAG VISIT (OUTPATIENT)
Dept: PHARMACY | Age: 89
End: 2024-07-08
Payer: MEDICARE

## 2024-07-08 DIAGNOSIS — I48.21 PERMANENT ATRIAL FIBRILLATION (HCC): ICD-10-CM

## 2024-07-08 DIAGNOSIS — Z79.01 LONG TERM CURRENT USE OF ANTICOAGULANT THERAPY: Primary | ICD-10-CM

## 2024-07-08 LAB — INR BLD: 2.3

## 2024-07-08 PROCEDURE — 85610 PROTHROMBIN TIME: CPT | Performed by: FAMILY MEDICINE

## 2024-07-08 PROCEDURE — 99211 OFF/OP EST MAY X REQ PHY/QHP: CPT | Performed by: FAMILY MEDICINE

## 2024-07-08 NOTE — PROGRESS NOTES
Ms. De León is here for management of anticoagulation for Atrial Fibrillation.   PMH also significant for HTN and HLD.   She presents today w/out complaint.  Pt verifies dosing regimen as listed above.   Pt denies s/sx bleeding/bruising/swelling/SOB.  Patient reports not taking any OTC/Herbals/RX.   Reviewed dietary concerns- she states that she has been eating more salads lately.   No ETOH or tobacco use.    Pt mentioned wanting to go to a lab and having her MD manage it because the lab is closer. She will talk to Dr. Black whenever she sees him next.    INR 2.3 is within the acceptable therapeutic range of 2-3.    Recommend to continue dose of 4 mg daily except for 2 mg on Mon/Wed/Fri.  Patient has 4 mg tablets.  Will continue to monitor and check INR in 5 weeks (prefers 5 weeks if therapeutic).   Dosing reminder card given with phone number, appointment date and time.   Return to clinic:  @ 1130     Referring Physician: Dr. Fidel Black  CPA signed  Referral date- 24    Armando Gomez, PharmD 2024 11:20 AM    For Pharmacy Admin Tracking Only    Intervention Detail: Adherence Monitorin  Total # of Interventions Recommended: 1  Total # of Interventions Accepted: 1  Time Spent (min): 15

## 2024-08-09 ENCOUNTER — ANTI-COAG VISIT (OUTPATIENT)
Dept: PHARMACY | Age: 89
End: 2024-08-09
Payer: MEDICARE

## 2024-08-09 DIAGNOSIS — Z79.01 LONG TERM CURRENT USE OF ANTICOAGULANT THERAPY: Primary | ICD-10-CM

## 2024-08-09 DIAGNOSIS — I48.21 PERMANENT ATRIAL FIBRILLATION (HCC): ICD-10-CM

## 2024-08-09 LAB
INTERNATIONAL NORMALIZATION RATIO, POC: 2.1
PROTHROMBIN TIME, POC: NORMAL

## 2024-08-09 PROCEDURE — 85610 PROTHROMBIN TIME: CPT

## 2024-08-09 PROCEDURE — 99211 OFF/OP EST MAY X REQ PHY/QHP: CPT

## 2024-08-09 NOTE — PROGRESS NOTES
Ms. De León is here for management of anticoagulation for Atrial Fibrillation.   PMH also significant for HTN and HLD.   She presents today w/out complaint.  Pt verifies dosing regimen as listed above.   Pt denies s/sx bleeding/bruising/swelling/SOB.  Patient reports not taking any OTC/Herbals/RX.   Reviewed dietary concerns- she states that she has been eating more salads lately.   No ETOH or tobacco use.    Pt mentioned wanting to go to a lab and having her MD manage it because the lab is closer. She will talk to Dr. Black whenever she sees him next.    INR 2.1 is within the acceptable therapeutic range of 2-3.    Recommend to continue dose of 4 mg daily except for 2 mg on Mon/Wed/Fri.  Patient has 4 mg tablets.  Will continue to monitor and check INR in 5 weeks (prefers 5 weeks if therapeutic).   Dosing reminder card given with phone number, appointment date and time.   Return to clinic:9/16 @ 1130     Referring Physician: Dr. Fidel Black  CPA signed  Referral date- 7/8/24    Ernestine Silva Pharm. D.  For Pharmacy Admin Tracking Only    Intervention Detail:   Total # of Interventions Recommended: 0  Total # of Interventions Accepted: 0  Time Spent (min): 15

## 2024-09-16 ENCOUNTER — ANTI-COAG VISIT (OUTPATIENT)
Dept: PHARMACY | Age: 89
End: 2024-09-16
Payer: MEDICARE

## 2024-09-16 DIAGNOSIS — I48.21 PERMANENT ATRIAL FIBRILLATION (HCC): ICD-10-CM

## 2024-09-16 DIAGNOSIS — Z79.01 LONG TERM CURRENT USE OF ANTICOAGULANT THERAPY: Primary | ICD-10-CM

## 2024-09-16 LAB
INTERNATIONAL NORMALIZATION RATIO, POC: 2.3
PROTHROMBIN TIME, POC: 0

## 2024-09-16 PROCEDURE — 85610 PROTHROMBIN TIME: CPT | Performed by: INTERNAL MEDICINE

## 2024-09-16 PROCEDURE — 99211 OFF/OP EST MAY X REQ PHY/QHP: CPT | Performed by: INTERNAL MEDICINE

## 2024-10-04 NOTE — PROGRESS NOTES
She exhibits no edema.   Neurological: She is alert and oriented to person, place, and time.   Skin: Skin is warm and dry.   Psychiatric: She has a normal mood and affect.     Assessment:  1. Permanent Atrial Fibrillation - HR control with diltiazem, CVA protection with warfarin.  2. HTN- managed by PCP.   3. HLD- managed by PCP.    Plan:  1. Continue cardiac medications as prescribed.   2. Continue activity as tolerated.   3. Follow up in 6 months.     QUALITY MEASURES  1. Tobacco Cessation Counseling: NA  2. Retake of BP if >140/90:   NA  3. Documentation to PCP/referring for new patient:  Sent to PCP at close of office visit  4. CAD patient on anti-platelet: NA  5. CAD patient on STATIN therapy:  Yes  6. Patient with CHF and aFib on anticoagulation: YES, Coumadin    This note has been scribed in the presence of Gil Aburto MD, by Letty Chamberlain RN.     I, Dr. Gil Aburto, personally performed the services described in this documentation as scribed by Letty Chamberlain RN in my presence, and it is both accurate and complete.       Gil Aburto M.D.

## 2024-10-09 ENCOUNTER — OFFICE VISIT (OUTPATIENT)
Dept: CARDIOLOGY CLINIC | Age: 89
End: 2024-10-09

## 2024-10-09 VITALS
HEIGHT: 60 IN | BODY MASS INDEX: 25.64 KG/M2 | WEIGHT: 130.6 LBS | DIASTOLIC BLOOD PRESSURE: 70 MMHG | OXYGEN SATURATION: 96 % | HEART RATE: 92 BPM | SYSTOLIC BLOOD PRESSURE: 132 MMHG

## 2024-10-09 DIAGNOSIS — I48.21 PERMANENT ATRIAL FIBRILLATION (HCC): Primary | ICD-10-CM

## 2024-10-09 NOTE — PATIENT INSTRUCTIONS
Plan:  1. Continue cardiac medications as prescribed.   2. Continue activity as tolerated.   3. Follow up in 6 months.

## 2024-10-21 ENCOUNTER — ANTI-COAG VISIT (OUTPATIENT)
Dept: PHARMACY | Age: 89
End: 2024-10-21
Payer: MEDICARE

## 2024-10-21 DIAGNOSIS — I48.21 PERMANENT ATRIAL FIBRILLATION (HCC): ICD-10-CM

## 2024-10-21 DIAGNOSIS — Z79.01 LONG TERM CURRENT USE OF ANTICOAGULANT THERAPY: Primary | ICD-10-CM

## 2024-10-21 LAB
INTERNATIONAL NORMALIZATION RATIO, POC: 2.8
PROTHROMBIN TIME, POC: 0

## 2024-10-21 PROCEDURE — 85610 PROTHROMBIN TIME: CPT

## 2024-10-21 PROCEDURE — 99211 OFF/OP EST MAY X REQ PHY/QHP: CPT

## 2024-10-21 NOTE — PROGRESS NOTES
Ms. De León is here for management of anticoagulation for Atrial Fibrillation. PMH also significant for HTN and HLD.   She presents today w/out complaint.    Pt verifies dosing regimen as listed above.   Pt denies missed/extra doses  Pt denies s/sx bleeding/bruising/swelling/SOB.  Pt denies changes in Rx/OTC/herbal supplements  Reviewed dietary concerns- she states that she has been eating more salads lately.   No ETOH or tobacco use.    Pt mentioned wanting to go to a lab and having her MD manage it because the lab is closer. She will talk to Dr. Black whenever she sees him next.    INR 2.8 is within the acceptable therapeutic range of 2-3.    Recommend to continue dose of 4 mg daily except for 2 mg on Mon/Wed/Fri.  Patient has 4 mg tablets.  Will continue to monitor and check INR in 5 weeks (prefers 5 weeks if therapeutic).   Dosing reminder card given with phone number, appointment date and time.   Return to clinic:11/25/24 @ 11:30     Referring Physician: Dr. Fidel Black  CPA signed  Referral date- 7/8/24    Pushpa FariasD 10/21/2024 11:29 AM    For Pharmacy Admin Tracking Only  Intervention Detail:   Total # of Interventions Recommended: 0  Total # of Interventions Accepted: 0  Time Spent (min): 15

## 2024-11-07 DIAGNOSIS — I48.21 PERMANENT ATRIAL FIBRILLATION (HCC): ICD-10-CM

## 2024-11-07 RX ORDER — CHOLECALCIFEROL (VITAMIN D3) 125 MCG
CAPSULE ORAL
Qty: 90 CAPSULE | Refills: 0 | Status: SHIPPED | OUTPATIENT
Start: 2024-11-07

## 2024-11-07 RX ORDER — DILTIAZEM HYDROCHLORIDE 240 MG/1
240 CAPSULE, COATED, EXTENDED RELEASE ORAL DAILY
Qty: 90 CAPSULE | Refills: 0 | Status: SHIPPED | OUTPATIENT
Start: 2024-11-07

## 2024-11-07 RX ORDER — OMEPRAZOLE 40 MG/1
40 CAPSULE, DELAYED RELEASE ORAL DAILY
Qty: 90 CAPSULE | Refills: 0 | Status: SHIPPED | OUTPATIENT
Start: 2024-11-07

## 2024-11-07 RX ORDER — WARFARIN SODIUM 4 MG/1
4 TABLET ORAL DAILY
Qty: 90 TABLET | Refills: 0 | Status: SHIPPED | OUTPATIENT
Start: 2024-11-07

## 2024-11-07 RX ORDER — ATORVASTATIN CALCIUM 20 MG/1
20 TABLET, FILM COATED ORAL DAILY
Qty: 90 TABLET | Refills: 0 | Status: SHIPPED | OUTPATIENT
Start: 2024-11-07

## 2024-11-07 NOTE — TELEPHONE ENCOUNTER
Future Appointments   Date Time Provider Department Center   11/25/2024 11:30 AM [place] [facility]   1/2/2025 11:00 AM Fidel Black DO [facility]   4/10/2025 11:15 AM [facility] De O[facility] Jr., MD [facility]     LOV 6/20/2024

## 2024-11-24 ENCOUNTER — HOSPITAL ENCOUNTER (INPATIENT)
Age: 88
LOS: 8 days | Discharge: NURSING, REHAB OR LONG-TERM CARE | DRG: 392 | End: 2024-12-02
Attending: EMERGENCY MEDICINE | Admitting: STUDENT IN AN ORGANIZED HEALTH CARE EDUCATION/TRAINING PROGRAM
Payer: MEDICARE

## 2024-11-24 ENCOUNTER — APPOINTMENT (OUTPATIENT)
Dept: GENERAL RADIOLOGY | Age: 88
DRG: 392 | End: 2024-11-24
Payer: MEDICARE

## 2024-11-24 ENCOUNTER — APPOINTMENT (OUTPATIENT)
Dept: CT IMAGING | Age: 88
DRG: 392 | End: 2024-11-24
Payer: MEDICARE

## 2024-11-24 DIAGNOSIS — R01.1 HEART MURMUR: ICD-10-CM

## 2024-11-24 DIAGNOSIS — R79.89 ELEVATED TROPONIN: ICD-10-CM

## 2024-11-24 DIAGNOSIS — K57.32 DIVERTICULITIS OF COLON: Primary | ICD-10-CM

## 2024-11-24 PROBLEM — K57.92 DIVERTICULITIS: Status: ACTIVE | Noted: 2024-11-24

## 2024-11-24 LAB
ALBUMIN SERPL-MCNC: 3.8 G/DL (ref 3.4–5)
ALBUMIN/GLOB SERPL: 1.3 {RATIO} (ref 1.1–2.2)
ALP SERPL-CCNC: 114 U/L (ref 40–129)
ALT SERPL-CCNC: 7 U/L (ref 10–40)
ANION GAP SERPL CALCULATED.3IONS-SCNC: 17 MMOL/L (ref 3–16)
AST SERPL-CCNC: 18 U/L (ref 15–37)
BACTERIA URNS QL MICRO: ABNORMAL /HPF
BASOPHILS # BLD: 0.1 K/UL (ref 0–0.2)
BASOPHILS NFR BLD: 1.1 %
BILIRUB SERPL-MCNC: 0.7 MG/DL (ref 0–1)
BILIRUB UR QL STRIP.AUTO: NEGATIVE
BUN SERPL-MCNC: 12 MG/DL (ref 7–20)
CALCIUM SERPL-MCNC: 9.1 MG/DL (ref 8.3–10.6)
CHLORIDE SERPL-SCNC: 103 MMOL/L (ref 99–110)
CLARITY UR: CLEAR
CO2 SERPL-SCNC: 22 MMOL/L (ref 21–32)
COLOR UR: YELLOW
CREAT SERPL-MCNC: 1 MG/DL (ref 0.6–1.2)
DEPRECATED RDW RBC AUTO: 14.2 % (ref 12.4–15.4)
EKG DIAGNOSIS: NORMAL
EKG Q-T INTERVAL: 338 MS
EKG QRS DURATION: 84 MS
EKG QTC CALCULATION (BAZETT): 438 MS
EKG R AXIS: 38 DEGREES
EKG T AXIS: -52 DEGREES
EKG VENTRICULAR RATE: 101 BPM
EOSINOPHIL # BLD: 0.1 K/UL (ref 0–0.6)
EOSINOPHIL NFR BLD: 1.8 %
EPI CELLS #/AREA URNS HPF: ABNORMAL /HPF (ref 0–5)
FLUAV RNA RESP QL NAA+PROBE: NOT DETECTED
FLUBV RNA RESP QL NAA+PROBE: NOT DETECTED
GFR SERPLBLD CREATININE-BSD FMLA CKD-EPI: 52 ML/MIN/{1.73_M2}
GLUCOSE SERPL-MCNC: 130 MG/DL (ref 70–99)
GLUCOSE UR STRIP.AUTO-MCNC: NEGATIVE MG/DL
HCT VFR BLD AUTO: 38.9 % (ref 36–48)
HGB BLD-MCNC: 13.1 G/DL (ref 12–16)
HGB UR QL STRIP.AUTO: ABNORMAL
INR PPP: 1.71 (ref 0.85–1.15)
INR PPP: 1.85 (ref 0.85–1.15)
KETONES UR STRIP.AUTO-MCNC: ABNORMAL MG/DL
LACTATE BLDV-SCNC: 1 MMOL/L (ref 0.4–1.9)
LACTATE BLDV-SCNC: 1.2 MMOL/L (ref 0.4–1.9)
LEUKOCYTE ESTERASE UR QL STRIP.AUTO: ABNORMAL
LYMPHOCYTES # BLD: 2 K/UL (ref 1–5.1)
LYMPHOCYTES NFR BLD: 34.3 %
MCH RBC QN AUTO: 33.9 PG (ref 26–34)
MCHC RBC AUTO-ENTMCNC: 33.7 G/DL (ref 31–36)
MCV RBC AUTO: 100.9 FL (ref 80–100)
MONOCYTES # BLD: 0.8 K/UL (ref 0–1.3)
MONOCYTES NFR BLD: 13 %
NEUTROPHILS # BLD: 2.9 K/UL (ref 1.7–7.7)
NEUTROPHILS NFR BLD: 49.8 %
NITRITE UR QL STRIP.AUTO: NEGATIVE
PH UR STRIP.AUTO: 7 [PH] (ref 5–8)
PLATELET # BLD AUTO: 203 K/UL (ref 135–450)
PMV BLD AUTO: 9 FL (ref 5–10.5)
POTASSIUM SERPL-SCNC: 3.6 MMOL/L (ref 3.5–5.1)
PROT SERPL-MCNC: 6.8 G/DL (ref 6.4–8.2)
PROT UR STRIP.AUTO-MCNC: ABNORMAL MG/DL
PROTHROMBIN TIME: 20.1 SEC (ref 11.9–14.9)
PROTHROMBIN TIME: 21.4 SEC (ref 11.9–14.9)
RBC # BLD AUTO: 3.86 M/UL (ref 4–5.2)
RBC #/AREA URNS HPF: ABNORMAL /HPF (ref 0–4)
SARS-COV-2 RNA RESP QL NAA+PROBE: NOT DETECTED
SODIUM SERPL-SCNC: 142 MMOL/L (ref 136–145)
SP GR UR STRIP.AUTO: 1.01 (ref 1–1.03)
TROPONIN, HIGH SENSITIVITY: 15 NG/L (ref 0–14)
TROPONIN, HIGH SENSITIVITY: 17 NG/L (ref 0–14)
UA COMPLETE W REFLEX CULTURE PNL UR: ABNORMAL
UA DIPSTICK W REFLEX MICRO PNL UR: YES
URN SPEC COLLECT METH UR: ABNORMAL
UROBILINOGEN UR STRIP-ACNC: 0.2 E.U./DL
WBC # BLD AUTO: 5.9 K/UL (ref 4–11)
WBC #/AREA URNS HPF: ABNORMAL /HPF (ref 0–5)

## 2024-11-24 PROCEDURE — 6370000000 HC RX 637 (ALT 250 FOR IP): Performed by: STUDENT IN AN ORGANIZED HEALTH CARE EDUCATION/TRAINING PROGRAM

## 2024-11-24 PROCEDURE — 96361 HYDRATE IV INFUSION ADD-ON: CPT

## 2024-11-24 PROCEDURE — 51798 US URINE CAPACITY MEASURE: CPT

## 2024-11-24 PROCEDURE — 87636 SARSCOV2 & INF A&B AMP PRB: CPT

## 2024-11-24 PROCEDURE — 2580000003 HC RX 258

## 2024-11-24 PROCEDURE — 6360000002 HC RX W HCPCS: Performed by: EMERGENCY MEDICINE

## 2024-11-24 PROCEDURE — 6360000002 HC RX W HCPCS

## 2024-11-24 PROCEDURE — 81001 URINALYSIS AUTO W/SCOPE: CPT

## 2024-11-24 PROCEDURE — 1200000000 HC SEMI PRIVATE

## 2024-11-24 PROCEDURE — 84484 ASSAY OF TROPONIN QUANT: CPT

## 2024-11-24 PROCEDURE — 85025 COMPLETE CBC W/AUTO DIFF WBC: CPT

## 2024-11-24 PROCEDURE — 93005 ELECTROCARDIOGRAM TRACING: CPT

## 2024-11-24 PROCEDURE — 99285 EMERGENCY DEPT VISIT HI MDM: CPT

## 2024-11-24 PROCEDURE — 6370000000 HC RX 637 (ALT 250 FOR IP)

## 2024-11-24 PROCEDURE — 85610 PROTHROMBIN TIME: CPT

## 2024-11-24 PROCEDURE — 80053 COMPREHEN METABOLIC PANEL: CPT

## 2024-11-24 PROCEDURE — 71045 X-RAY EXAM CHEST 1 VIEW: CPT

## 2024-11-24 PROCEDURE — 74176 CT ABD & PELVIS W/O CONTRAST: CPT

## 2024-11-24 PROCEDURE — 87040 BLOOD CULTURE FOR BACTERIA: CPT

## 2024-11-24 PROCEDURE — 2580000003 HC RX 258: Performed by: EMERGENCY MEDICINE

## 2024-11-24 PROCEDURE — 96365 THER/PROPH/DIAG IV INF INIT: CPT

## 2024-11-24 PROCEDURE — 83605 ASSAY OF LACTIC ACID: CPT

## 2024-11-24 RX ORDER — CIPROFLOXACIN 2 MG/ML
400 INJECTION, SOLUTION INTRAVENOUS EVERY 24 HOURS
Status: DISCONTINUED | OUTPATIENT
Start: 2024-11-24 | End: 2024-11-24

## 2024-11-24 RX ORDER — 0.9 % SODIUM CHLORIDE 0.9 %
250 INTRAVENOUS SOLUTION INTRAVENOUS ONCE
Status: COMPLETED | OUTPATIENT
Start: 2024-11-24 | End: 2024-11-24

## 2024-11-24 RX ORDER — SODIUM CHLORIDE 9 MG/ML
INJECTION, SOLUTION INTRAVENOUS CONTINUOUS
Status: ACTIVE | OUTPATIENT
Start: 2024-11-24 | End: 2024-11-25

## 2024-11-24 RX ORDER — ATORVASTATIN CALCIUM 10 MG/1
20 TABLET, FILM COATED ORAL DAILY
Status: DISCONTINUED | OUTPATIENT
Start: 2024-11-24 | End: 2024-12-02 | Stop reason: HOSPADM

## 2024-11-24 RX ORDER — SODIUM CHLORIDE 9 MG/ML
INJECTION, SOLUTION INTRAVENOUS PRN
Status: DISCONTINUED | OUTPATIENT
Start: 2024-11-24 | End: 2024-12-02 | Stop reason: HOSPADM

## 2024-11-24 RX ORDER — ACETAMINOPHEN 650 MG/1
650 SUPPOSITORY RECTAL EVERY 6 HOURS PRN
Status: DISCONTINUED | OUTPATIENT
Start: 2024-11-24 | End: 2024-12-02 | Stop reason: HOSPADM

## 2024-11-24 RX ORDER — SODIUM CHLORIDE 0.9 % (FLUSH) 0.9 %
5-40 SYRINGE (ML) INJECTION PRN
Status: DISCONTINUED | OUTPATIENT
Start: 2024-11-24 | End: 2024-12-02 | Stop reason: HOSPADM

## 2024-11-24 RX ORDER — POLYETHYLENE GLYCOL 3350 17 G/17G
17 POWDER, FOR SOLUTION ORAL DAILY PRN
Status: DISCONTINUED | OUTPATIENT
Start: 2024-11-24 | End: 2024-12-02 | Stop reason: HOSPADM

## 2024-11-24 RX ORDER — ONDANSETRON 4 MG/1
4 TABLET, ORALLY DISINTEGRATING ORAL EVERY 8 HOURS PRN
Status: DISCONTINUED | OUTPATIENT
Start: 2024-11-24 | End: 2024-12-02 | Stop reason: HOSPADM

## 2024-11-24 RX ORDER — WARFARIN SODIUM 2 MG/1
4 TABLET ORAL
Status: COMPLETED | OUTPATIENT
Start: 2024-11-24 | End: 2024-11-24

## 2024-11-24 RX ORDER — ONDANSETRON 2 MG/ML
4 INJECTION INTRAMUSCULAR; INTRAVENOUS EVERY 6 HOURS PRN
Status: DISCONTINUED | OUTPATIENT
Start: 2024-11-24 | End: 2024-12-02 | Stop reason: HOSPADM

## 2024-11-24 RX ORDER — ACETAMINOPHEN 325 MG/1
650 TABLET ORAL EVERY 6 HOURS PRN
Status: DISCONTINUED | OUTPATIENT
Start: 2024-11-24 | End: 2024-12-02 | Stop reason: HOSPADM

## 2024-11-24 RX ORDER — METRONIDAZOLE 500 MG/100ML
500 INJECTION, SOLUTION INTRAVENOUS EVERY 8 HOURS
Status: DISCONTINUED | OUTPATIENT
Start: 2024-11-24 | End: 2024-11-28

## 2024-11-24 RX ORDER — DILTIAZEM HYDROCHLORIDE 240 MG/1
240 CAPSULE, COATED, EXTENDED RELEASE ORAL DAILY
Status: DISCONTINUED | OUTPATIENT
Start: 2024-11-24 | End: 2024-12-02 | Stop reason: HOSPADM

## 2024-11-24 RX ORDER — CIPROFLOXACIN 2 MG/ML
400 INJECTION, SOLUTION INTRAVENOUS EVERY 24 HOURS
Status: DISCONTINUED | OUTPATIENT
Start: 2024-11-25 | End: 2024-11-25

## 2024-11-24 RX ORDER — METRONIDAZOLE 500 MG/100ML
500 INJECTION, SOLUTION INTRAVENOUS EVERY 8 HOURS
Status: DISCONTINUED | OUTPATIENT
Start: 2024-11-24 | End: 2024-11-24

## 2024-11-24 RX ORDER — SODIUM CHLORIDE 0.9 % (FLUSH) 0.9 %
5-40 SYRINGE (ML) INJECTION EVERY 12 HOURS SCHEDULED
Status: DISCONTINUED | OUTPATIENT
Start: 2024-11-24 | End: 2024-12-02 | Stop reason: HOSPADM

## 2024-11-24 RX ORDER — PANTOPRAZOLE SODIUM 40 MG/1
40 TABLET, DELAYED RELEASE ORAL
Status: DISCONTINUED | OUTPATIENT
Start: 2024-11-25 | End: 2024-11-28

## 2024-11-24 RX ADMIN — SODIUM CHLORIDE 250 ML: 9 INJECTION, SOLUTION INTRAVENOUS at 11:42

## 2024-11-24 RX ADMIN — WARFARIN SODIUM 4 MG: 2 TABLET ORAL at 18:41

## 2024-11-24 RX ADMIN — MEROPENEM 1000 MG: 1 INJECTION INTRAVENOUS at 13:02

## 2024-11-24 RX ADMIN — SODIUM CHLORIDE, PRESERVATIVE FREE 10 ML: 5 INJECTION INTRAVENOUS at 21:33

## 2024-11-24 RX ADMIN — SODIUM CHLORIDE: 9 INJECTION, SOLUTION INTRAVENOUS at 16:31

## 2024-11-24 RX ADMIN — METRONIDAZOLE 500 MG: 500 INJECTION, SOLUTION INTRAVENOUS at 21:32

## 2024-11-24 RX ADMIN — ATORVASTATIN CALCIUM 20 MG: 10 TABLET, FILM COATED ORAL at 21:31

## 2024-11-24 ASSESSMENT — PAIN - FUNCTIONAL ASSESSMENT: PAIN_FUNCTIONAL_ASSESSMENT: NONE - DENIES PAIN

## 2024-11-24 NOTE — ED PROVIDER NOTES
[facility]  ED     EMERGENCY DEPARTMENT ENCOUNTER            Pt Name: Zeinab De León   MRN: [id number]   Birthdate 1/2/1931   Date of evaluation: 11/24/2024   Provider: Zeferino Danielson MD   PCP: Fidel Black DO   Note Started: 11:21 AM EST 11/24/24          CHIEF COMPLAINT     Chief Complaint   Patient presents with    Fatigue     Pt c/o weakness and nausea for 'a few days'.  Reports difficulty emptying bladder.  [id number].  #18 L AC started by EMS 4mg zofran given and 0.9NS started.             HISTORY OF PRESENT ILLNESS:   History from : Patient and her son  Limitations to history : None     Zeinab De León is a 89 y.o. female who presents complaining of nausea for the past few days.  She also reports some associated fatigue and malaise.  She is also had frequency and she thinks her urine is just been a little bit pink-tinged.  While she has had nausea she denies any vomiting or diarrhea.  No fever.  No cough.  She states that she has had no rigors; however, she has had some mild tremors of her bilateral hands.  She states because she has been nauseated she has been eating and drinking well the past few days.      Nursing Notes were all reviewed and agreed with, or any disagreements were addressed in the HPI.     REVIEW OF SYSTEMS :    Review of Systems   Constitutional:  Positive for fatigue. Negative for chills and fever.   HENT:  Negative for rhinorrhea and sore throat.    Eyes:  Negative for redness.   Respiratory:  Negative for shortness of breath.    Cardiovascular:  Negative for chest pain.   Gastrointestinal:  Positive for nausea. Negative for abdominal pain.   Genitourinary:  Positive for dysuria. Negative for flank pain.   Neurological:  Negative for headaches.   Hematological:  Negative for adenopathy.   Psychiatric/Behavioral:  Negative for confusion.         MEDICAL HISTORY   has a past medical history of Anemia (2016), Atrial fibrillation (HCC) (2006), Cancer (HCC)

## 2024-11-24 NOTE — H&P
Date: 11/24/2024  EXAMINATION: ONE XRAY VIEW OF THE CHEST 11/24/2024 10:58 am COMPARISON: 06/13/2016 HISTORY: Acute chest pain. FINDINGS: Patient is mildly rotated and chin overlies the lung apices.  Mildly enlarged cardiomediastinal silhouette.  No acute airspace disease, pleural effusion, or pneumothorax.  Osteopenia with osteoarthrosis of the shoulders.  Extensive atherosclerotic calcification.     No acute abnormality.         Electronically signed by Margarito Holcomb DO on 11/24/2024 at 3:25 PM

## 2024-11-24 NOTE — ED NOTES
Admitting at bedside.  
ED attending Danielson notified that lactic of 1.2 was drawn with initial labs at 1059 on 11/24/24.  Repeat lactic of 1.0 was drawn at 1252 on 11/24/24.  No further questions or orders at this time.  
Pt leaves dept to inpatient room via stretcher.  Taken on telemetry.  1 belonging bag sent with patient son.  
Pt states was incontinent just prior to arrival.  States she can tell when she has to void, but has been too weak to ambulate to .  Pt joann care provided.  Placed on purewick and instructed on use.  Verbal understanding from pt.  
Tele 071 @ 9411  
Product Administration: no    Abnormal labs:   Abnormal Labs Reviewed   CBC WITH AUTO DIFFERENTIAL - Abnormal; Notable for the following components:       Result Value    RBC 3.86 (*)     .9 (*)     All other components within normal limits   COMPREHENSIVE METABOLIC PANEL W/ REFLEX TO MG FOR LOW K - Abnormal; Notable for the following components:    Anion Gap 17 (*)     Glucose 130 (*)     Est, Glom Filt Rate 52 (*)     ALT 7 (*)     All other components within normal limits   TROPONIN - Abnormal; Notable for the following components:    Troponin, High Sensitivity 17 (*)     All other components within normal limits   TROPONIN - Abnormal; Notable for the following components:    Troponin, High Sensitivity 15 (*)     All other components within normal limits   URINALYSIS WITH REFLEX TO CULTURE - Abnormal; Notable for the following components:    Ketones, Urine TRACE (*)     Blood, Urine TRACE-INTACT (*)     Protein, UA TRACE (*)     Leukocyte Esterase, Urine SMALL (*)     All other components within normal limits   PROTIME-INR - Abnormal; Notable for the following components:    Protime 20.1 (*)     INR 1.71 (*)     All other components within normal limits   MICROSCOPIC URINALYSIS - Abnormal; Notable for the following components:    WBC, UA 6-9 (*)     Epithelial Cells, UA 6-10 (*)     Bacteria, UA 4+ (*)     All other components within normal limits     Critical values: no  Intervention for critical value(s):     Abnormal Imaging: no             You may also review the ED PT Care Timeline found under the Summary Tab, ED Encounter Summary, Timeline Reports, ED Patient Care Timeline.     Recommendation    Pending orders/Uncompleted orders to hand off:  home meds    Additional Comments: purewick in place  If any further questions, please call Sending RN at [phone]

## 2024-11-24 NOTE — CONSULTS
Pharmacy Note  Warfarin Consult  Dx: AFib  Goal INR range 2-3   Home Warfarin dose: 2 mg  every Mon, Wed, Fri; 4 mg  all other days     Date  INR  Warfarin  11/24              1.71                     4 mg     Recommend Warfarin 4 mg tonight x1.  Daily INR ordered.  Rx will continue to manage therapy per consult order.  Pushpa ValdezD, BCPS 11/24/2024 2:30 PM

## 2024-11-25 LAB
ANION GAP SERPL CALCULATED.3IONS-SCNC: 15 MMOL/L (ref 3–16)
BASOPHILS # BLD: 0.1 K/UL (ref 0–0.2)
BASOPHILS NFR BLD: 1.2 %
BUN SERPL-MCNC: 10 MG/DL (ref 7–20)
CALCIUM SERPL-MCNC: 8.3 MG/DL (ref 8.3–10.6)
CHLORIDE SERPL-SCNC: 108 MMOL/L (ref 99–110)
CO2 SERPL-SCNC: 19 MMOL/L (ref 21–32)
CREAT SERPL-MCNC: 0.8 MG/DL (ref 0.6–1.2)
DEPRECATED RDW RBC AUTO: 13.9 % (ref 12.4–15.4)
EOSINOPHIL # BLD: 0.1 K/UL (ref 0–0.6)
EOSINOPHIL NFR BLD: 2 %
GFR SERPLBLD CREATININE-BSD FMLA CKD-EPI: 68 ML/MIN/{1.73_M2}
GLUCOSE SERPL-MCNC: 71 MG/DL (ref 70–99)
HCT VFR BLD AUTO: 38.7 % (ref 36–48)
HGB BLD-MCNC: 12.8 G/DL (ref 12–16)
LYMPHOCYTES # BLD: 1 K/UL (ref 1–5.1)
LYMPHOCYTES NFR BLD: 19.5 %
MAGNESIUM SERPL-MCNC: 1.5 MG/DL (ref 1.8–2.4)
MCH RBC QN AUTO: 33.5 PG (ref 26–34)
MCHC RBC AUTO-ENTMCNC: 33.1 G/DL (ref 31–36)
MCV RBC AUTO: 101.3 FL (ref 80–100)
MONOCYTES # BLD: 0.6 K/UL (ref 0–1.3)
MONOCYTES NFR BLD: 11.7 %
NEUTROPHILS # BLD: 3.4 K/UL (ref 1.7–7.7)
NEUTROPHILS NFR BLD: 65.6 %
PLATELET # BLD AUTO: 181 K/UL (ref 135–450)
PMV BLD AUTO: 9.4 FL (ref 5–10.5)
POTASSIUM SERPL-SCNC: 3.2 MMOL/L (ref 3.5–5.1)
RBC # BLD AUTO: 3.82 M/UL (ref 4–5.2)
SODIUM SERPL-SCNC: 142 MMOL/L (ref 136–145)
WBC # BLD AUTO: 5.2 K/UL (ref 4–11)

## 2024-11-25 PROCEDURE — 36415 COLL VENOUS BLD VENIPUNCTURE: CPT

## 2024-11-25 PROCEDURE — 85025 COMPLETE CBC W/AUTO DIFF WBC: CPT

## 2024-11-25 PROCEDURE — 83735 ASSAY OF MAGNESIUM: CPT

## 2024-11-25 PROCEDURE — 6370000000 HC RX 637 (ALT 250 FOR IP): Performed by: STUDENT IN AN ORGANIZED HEALTH CARE EDUCATION/TRAINING PROGRAM

## 2024-11-25 PROCEDURE — 6370000000 HC RX 637 (ALT 250 FOR IP)

## 2024-11-25 PROCEDURE — 1200000000 HC SEMI PRIVATE

## 2024-11-25 PROCEDURE — 6360000002 HC RX W HCPCS

## 2024-11-25 PROCEDURE — 80048 BASIC METABOLIC PNL TOTAL CA: CPT

## 2024-11-25 PROCEDURE — 2580000003 HC RX 258

## 2024-11-25 RX ORDER — POTASSIUM CHLORIDE 1500 MG/1
40 TABLET, EXTENDED RELEASE ORAL ONCE
Status: COMPLETED | OUTPATIENT
Start: 2024-11-25 | End: 2024-11-25

## 2024-11-25 RX ORDER — WARFARIN SODIUM 2 MG/1
2 TABLET ORAL
Status: COMPLETED | OUTPATIENT
Start: 2024-11-25 | End: 2024-11-25

## 2024-11-25 RX ORDER — LANOLIN ALCOHOL/MO/W.PET/CERES
400 CREAM (GRAM) TOPICAL ONCE
Status: COMPLETED | OUTPATIENT
Start: 2024-11-25 | End: 2024-11-25

## 2024-11-25 RX ORDER — CIPROFLOXACIN 2 MG/ML
400 INJECTION, SOLUTION INTRAVENOUS EVERY 12 HOURS
Status: DISCONTINUED | OUTPATIENT
Start: 2024-11-25 | End: 2024-11-26

## 2024-11-25 RX ADMIN — METRONIDAZOLE 500 MG: 500 INJECTION, SOLUTION INTRAVENOUS at 20:36

## 2024-11-25 RX ADMIN — METRONIDAZOLE 500 MG: 500 INJECTION, SOLUTION INTRAVENOUS at 06:27

## 2024-11-25 RX ADMIN — ATORVASTATIN CALCIUM 20 MG: 10 TABLET, FILM COATED ORAL at 20:32

## 2024-11-25 RX ADMIN — DILTIAZEM HYDROCHLORIDE 240 MG: 240 CAPSULE, COATED, EXTENDED RELEASE ORAL at 08:38

## 2024-11-25 RX ADMIN — SODIUM CHLORIDE: 9 INJECTION, SOLUTION INTRAVENOUS at 03:37

## 2024-11-25 RX ADMIN — Medication 400 MG: at 08:38

## 2024-11-25 RX ADMIN — CIPROFLOXACIN 400 MG: 2 INJECTION, SOLUTION INTRAVENOUS at 11:55

## 2024-11-25 RX ADMIN — ONDANSETRON 4 MG: 4 TABLET, ORALLY DISINTEGRATING ORAL at 06:30

## 2024-11-25 RX ADMIN — POTASSIUM CHLORIDE 40 MEQ: 1500 TABLET, EXTENDED RELEASE ORAL at 08:38

## 2024-11-25 RX ADMIN — METRONIDAZOLE 500 MG: 500 INJECTION, SOLUTION INTRAVENOUS at 14:44

## 2024-11-25 RX ADMIN — WARFARIN SODIUM 2 MG: 2 TABLET ORAL at 17:51

## 2024-11-25 RX ADMIN — SODIUM CHLORIDE, PRESERVATIVE FREE 10 ML: 5 INJECTION INTRAVENOUS at 08:38

## 2024-11-25 RX ADMIN — PANTOPRAZOLE SODIUM 40 MG: 40 TABLET, DELAYED RELEASE ORAL at 06:27

## 2024-11-25 RX ADMIN — SODIUM CHLORIDE, PRESERVATIVE FREE 10 ML: 5 INJECTION INTRAVENOUS at 20:33

## 2024-11-25 RX ADMIN — CIPROFLOXACIN 400 MG: 2 INJECTION, SOLUTION INTRAVENOUS at 23:27

## 2024-11-26 LAB
ANION GAP SERPL CALCULATED.3IONS-SCNC: 15 MMOL/L (ref 3–16)
BASOPHILS # BLD: 0.1 K/UL (ref 0–0.2)
BASOPHILS NFR BLD: 1 %
BUN SERPL-MCNC: 11 MG/DL (ref 7–20)
CALCIUM SERPL-MCNC: 8.4 MG/DL (ref 8.3–10.6)
CHLORIDE SERPL-SCNC: 108 MMOL/L (ref 99–110)
CO2 SERPL-SCNC: 19 MMOL/L (ref 21–32)
CREAT SERPL-MCNC: 1 MG/DL (ref 0.6–1.2)
DEPRECATED RDW RBC AUTO: 14 % (ref 12.4–15.4)
EOSINOPHIL # BLD: 0.1 K/UL (ref 0–0.6)
EOSINOPHIL NFR BLD: 1.8 %
GFR SERPLBLD CREATININE-BSD FMLA CKD-EPI: 52 ML/MIN/{1.73_M2}
GLUCOSE SERPL-MCNC: 99 MG/DL (ref 70–99)
HCT VFR BLD AUTO: 38.6 % (ref 36–48)
HGB BLD-MCNC: 12.8 G/DL (ref 12–16)
INR PPP: 2.83 (ref 0.85–1.15)
LYMPHOCYTES # BLD: 0.9 K/UL (ref 1–5.1)
LYMPHOCYTES NFR BLD: 15.3 %
MCH RBC QN AUTO: 33.6 PG (ref 26–34)
MCHC RBC AUTO-ENTMCNC: 33.2 G/DL (ref 31–36)
MCV RBC AUTO: 101.3 FL (ref 80–100)
MONOCYTES # BLD: 0.7 K/UL (ref 0–1.3)
MONOCYTES NFR BLD: 12.7 %
NEUTROPHILS # BLD: 4 K/UL (ref 1.7–7.7)
NEUTROPHILS NFR BLD: 69.2 %
PLATELET # BLD AUTO: 184 K/UL (ref 135–450)
PMV BLD AUTO: 9 FL (ref 5–10.5)
POTASSIUM SERPL-SCNC: 3.7 MMOL/L (ref 3.5–5.1)
PROTHROMBIN TIME: 29.6 SEC (ref 11.9–14.9)
RBC # BLD AUTO: 3.81 M/UL (ref 4–5.2)
SODIUM SERPL-SCNC: 142 MMOL/L (ref 136–145)
WBC # BLD AUTO: 5.8 K/UL (ref 4–11)

## 2024-11-26 PROCEDURE — 97166 OT EVAL MOD COMPLEX 45 MIN: CPT

## 2024-11-26 PROCEDURE — 97110 THERAPEUTIC EXERCISES: CPT

## 2024-11-26 PROCEDURE — 97162 PT EVAL MOD COMPLEX 30 MIN: CPT

## 2024-11-26 PROCEDURE — 85025 COMPLETE CBC W/AUTO DIFF WBC: CPT

## 2024-11-26 PROCEDURE — 97530 THERAPEUTIC ACTIVITIES: CPT

## 2024-11-26 PROCEDURE — 80048 BASIC METABOLIC PNL TOTAL CA: CPT

## 2024-11-26 PROCEDURE — 2580000003 HC RX 258

## 2024-11-26 PROCEDURE — 97535 SELF CARE MNGMENT TRAINING: CPT

## 2024-11-26 PROCEDURE — 6370000000 HC RX 637 (ALT 250 FOR IP)

## 2024-11-26 PROCEDURE — 85610 PROTHROMBIN TIME: CPT

## 2024-11-26 PROCEDURE — 1200000000 HC SEMI PRIVATE

## 2024-11-26 PROCEDURE — 36415 COLL VENOUS BLD VENIPUNCTURE: CPT

## 2024-11-26 PROCEDURE — 6360000002 HC RX W HCPCS

## 2024-11-26 RX ORDER — METRONIDAZOLE 500 MG/1
500 TABLET ORAL 3 TIMES DAILY
Qty: 21 TABLET | Refills: 0 | Status: CANCELLED | OUTPATIENT
Start: 2024-11-26 | End: 2024-12-03

## 2024-11-26 RX ORDER — MECOBALAMIN 5000 MCG
5 TABLET,DISINTEGRATING ORAL NIGHTLY
Status: DISCONTINUED | OUTPATIENT
Start: 2024-11-26 | End: 2024-12-02 | Stop reason: HOSPADM

## 2024-11-26 RX ORDER — CIPROFLOXACIN 2 MG/ML
400 INJECTION, SOLUTION INTRAVENOUS EVERY 24 HOURS
Status: DISCONTINUED | OUTPATIENT
Start: 2024-11-27 | End: 2024-11-28

## 2024-11-26 RX ORDER — CIPROFLOXACIN 500 MG/1
250 TABLET, FILM COATED ORAL 2 TIMES DAILY
Qty: 7 TABLET | Refills: 0 | Status: CANCELLED | OUTPATIENT
Start: 2024-11-26 | End: 2024-12-03

## 2024-11-26 RX ADMIN — ATORVASTATIN CALCIUM 20 MG: 10 TABLET, FILM COATED ORAL at 22:15

## 2024-11-26 RX ADMIN — PANTOPRAZOLE SODIUM 40 MG: 40 TABLET, DELAYED RELEASE ORAL at 05:56

## 2024-11-26 RX ADMIN — METRONIDAZOLE 500 MG: 500 INJECTION, SOLUTION INTRAVENOUS at 05:58

## 2024-11-26 RX ADMIN — SODIUM CHLORIDE 30 ML: 9 INJECTION, SOLUTION INTRAVENOUS at 14:18

## 2024-11-26 RX ADMIN — METRONIDAZOLE 500 MG: 500 INJECTION, SOLUTION INTRAVENOUS at 22:26

## 2024-11-26 RX ADMIN — SODIUM CHLORIDE, PRESERVATIVE FREE 10 ML: 5 INJECTION INTRAVENOUS at 22:29

## 2024-11-26 RX ADMIN — CIPROFLOXACIN 400 MG: 2 INJECTION, SOLUTION INTRAVENOUS at 11:41

## 2024-11-26 RX ADMIN — SODIUM CHLORIDE 30 ML: 9 INJECTION, SOLUTION INTRAVENOUS at 11:40

## 2024-11-26 RX ADMIN — SODIUM CHLORIDE: 9 INJECTION, SOLUTION INTRAVENOUS at 22:23

## 2024-11-26 RX ADMIN — DILTIAZEM HYDROCHLORIDE 240 MG: 240 CAPSULE, COATED, EXTENDED RELEASE ORAL at 09:39

## 2024-11-26 RX ADMIN — SODIUM CHLORIDE, PRESERVATIVE FREE 10 ML: 5 INJECTION INTRAVENOUS at 09:40

## 2024-11-26 RX ADMIN — METRONIDAZOLE 500 MG: 500 INJECTION, SOLUTION INTRAVENOUS at 14:19

## 2024-11-27 ENCOUNTER — APPOINTMENT (OUTPATIENT)
Dept: CT IMAGING | Age: 88
DRG: 392 | End: 2024-11-27
Payer: MEDICARE

## 2024-11-27 LAB
ANION GAP SERPL CALCULATED.3IONS-SCNC: 12 MMOL/L (ref 3–16)
BASOPHILS # BLD: 0.1 K/UL (ref 0–0.2)
BASOPHILS NFR BLD: 1.2 %
BUN SERPL-MCNC: 12 MG/DL (ref 7–20)
CALCIUM SERPL-MCNC: 8.7 MG/DL (ref 8.3–10.6)
CHLORIDE SERPL-SCNC: 107 MMOL/L (ref 99–110)
CO2 SERPL-SCNC: 21 MMOL/L (ref 21–32)
CREAT SERPL-MCNC: 1.1 MG/DL (ref 0.6–1.2)
DEPRECATED RDW RBC AUTO: 14.2 % (ref 12.4–15.4)
EOSINOPHIL # BLD: 0.1 K/UL (ref 0–0.6)
EOSINOPHIL NFR BLD: 2.5 %
GFR SERPLBLD CREATININE-BSD FMLA CKD-EPI: 47 ML/MIN/{1.73_M2}
GLUCOSE SERPL-MCNC: 118 MG/DL (ref 70–99)
HCT VFR BLD AUTO: 40.1 % (ref 36–48)
HGB BLD-MCNC: 13.4 G/DL (ref 12–16)
INR PPP: 3.1 (ref 0.85–1.15)
LYMPHOCYTES # BLD: 1 K/UL (ref 1–5.1)
LYMPHOCYTES NFR BLD: 20 %
MCH RBC QN AUTO: 33.5 PG (ref 26–34)
MCHC RBC AUTO-ENTMCNC: 33.3 G/DL (ref 31–36)
MCV RBC AUTO: 100.7 FL (ref 80–100)
MONOCYTES # BLD: 0.7 K/UL (ref 0–1.3)
MONOCYTES NFR BLD: 14.4 %
NEUTROPHILS # BLD: 3.2 K/UL (ref 1.7–7.7)
NEUTROPHILS NFR BLD: 61.9 %
PLATELET # BLD AUTO: 189 K/UL (ref 135–450)
PMV BLD AUTO: 9 FL (ref 5–10.5)
POTASSIUM SERPL-SCNC: 3.6 MMOL/L (ref 3.5–5.1)
PROTHROMBIN TIME: 31.8 SEC (ref 11.9–14.9)
RBC # BLD AUTO: 3.99 M/UL (ref 4–5.2)
SODIUM SERPL-SCNC: 140 MMOL/L (ref 136–145)
WBC # BLD AUTO: 5.2 K/UL (ref 4–11)

## 2024-11-27 PROCEDURE — 85610 PROTHROMBIN TIME: CPT

## 2024-11-27 PROCEDURE — 70450 CT HEAD/BRAIN W/O DYE: CPT

## 2024-11-27 PROCEDURE — 1200000000 HC SEMI PRIVATE

## 2024-11-27 PROCEDURE — 6360000002 HC RX W HCPCS

## 2024-11-27 PROCEDURE — 92610 EVALUATE SWALLOWING FUNCTION: CPT

## 2024-11-27 PROCEDURE — 6360000004 HC RX CONTRAST MEDICATION: Performed by: INTERNAL MEDICINE

## 2024-11-27 PROCEDURE — 80048 BASIC METABOLIC PNL TOTAL CA: CPT

## 2024-11-27 PROCEDURE — 99222 1ST HOSP IP/OBS MODERATE 55: CPT

## 2024-11-27 PROCEDURE — 70498 CT ANGIOGRAPHY NECK: CPT

## 2024-11-27 PROCEDURE — 2580000003 HC RX 258

## 2024-11-27 PROCEDURE — 6370000000 HC RX 637 (ALT 250 FOR IP)

## 2024-11-27 PROCEDURE — 97535 SELF CARE MNGMENT TRAINING: CPT

## 2024-11-27 PROCEDURE — 97530 THERAPEUTIC ACTIVITIES: CPT

## 2024-11-27 PROCEDURE — 85025 COMPLETE CBC W/AUTO DIFF WBC: CPT

## 2024-11-27 PROCEDURE — 36415 COLL VENOUS BLD VENIPUNCTURE: CPT

## 2024-11-27 PROCEDURE — 92526 ORAL FUNCTION THERAPY: CPT

## 2024-11-27 RX ORDER — IOPAMIDOL 755 MG/ML
75 INJECTION, SOLUTION INTRAVASCULAR
Status: COMPLETED | OUTPATIENT
Start: 2024-11-27 | End: 2024-11-27

## 2024-11-27 RX ADMIN — CIPROFLOXACIN 400 MG: 2 INJECTION, SOLUTION INTRAVENOUS at 10:39

## 2024-11-27 RX ADMIN — SODIUM CHLORIDE, PRESERVATIVE FREE 10 ML: 5 INJECTION INTRAVENOUS at 09:18

## 2024-11-27 RX ADMIN — METRONIDAZOLE 500 MG: 500 INJECTION, SOLUTION INTRAVENOUS at 16:03

## 2024-11-27 RX ADMIN — PANTOPRAZOLE SODIUM 40 MG: 40 TABLET, DELAYED RELEASE ORAL at 06:15

## 2024-11-27 RX ADMIN — IOPAMIDOL 75 ML: 755 INJECTION, SOLUTION INTRAVENOUS at 12:42

## 2024-11-27 RX ADMIN — METRONIDAZOLE 500 MG: 500 INJECTION, SOLUTION INTRAVENOUS at 06:19

## 2024-11-27 RX ADMIN — DILTIAZEM HYDROCHLORIDE 240 MG: 240 CAPSULE, COATED, EXTENDED RELEASE ORAL at 09:18

## 2024-11-27 RX ADMIN — METRONIDAZOLE 500 MG: 500 INJECTION, SOLUTION INTRAVENOUS at 20:31

## 2024-11-27 RX ADMIN — SODIUM CHLORIDE, PRESERVATIVE FREE 10 ML: 5 INJECTION INTRAVENOUS at 20:31

## 2024-11-27 RX ADMIN — SODIUM CHLORIDE: 9 INJECTION, SOLUTION INTRAVENOUS at 20:30

## 2024-11-27 RX ADMIN — ATORVASTATIN CALCIUM 20 MG: 10 TABLET, FILM COATED ORAL at 20:25

## 2024-11-27 RX ADMIN — Medication 5 MG: at 20:25

## 2024-11-27 NOTE — CONSULTS
Department of Cardiovascular & Thoracic Surgery  History and Physical          DIAGNOSIS:  Penetrating aortic ulcer    CHIEF COMPLAINT:  Nausea, weakness    History Obtained From:  patient, electronic medical record    HISTORY OF PRESENT ILLNESS:      The patient is a 89 y.o. female with significant past medical history of HTN, HLD, AFib on coumadin, colon CA s/p R hemicolectomy who initially presented to [facility] ED w/ c/o fatigue, weakness and nausea x 2-3 days.  CT A/P in the ED showed fat stranding around the sigmoid colon concerning for diverticulitis. Since admission she was noted to have dysarthria, L sided facial droop and LUE weakness. CT head was negative for acute findings. Today she underwent CTA head/neck that was negative for significant stenosis, but incidentally noted a 1 cm penetrating ulcer to the aortic arch.  We are asked to see in consultation regarding surgical recommendations.     Past Medical History:        Diagnosis Date    Anemia 2016    Atrial fibrillation (HCC) 2006    RESOLVED, ONE INCIDENT    Cancer (HCC) 2016    colon cancer    Hyperlipidemia     Hypertension     HAS BEEN RUNNING NORMAL    Skin cancer of forehead 11/2021       Past Surgical History:        Procedure Laterality Date    APPENDECTOMY      COLON SURGERY Right 6/7/16    Robotic Assisted Laparoscopic Right Colectomy    COLONOSCOPY  5/18/16    ascending colon mass, biopsy    FRACTURE SURGERY      Right ORIF ANKLE    HYSTERECTOMY (CERVIX STATUS UNKNOWN)      BSO    UPPER GASTROINTESTINAL ENDOSCOPY  5/18/16    biopsy gastric       Medications Prior to Admission:   Medications Prior to Admission: vitamin D (D3 HIGH POTENCY) 50 MCG (2000 UT) CAPS capsule, TAKE ONE CAPSULE BY MOUTH THREE TIMES A WEEK  warfarin (COUMADIN) 4 MG tablet, TAKE 1 TABLET BY MOUTH DAILY  omeprazole (PRILOSEC) 40 MG delayed release capsule, TAKE 1 CAPSULE BY MOUTH DAILY  atorvastatin (LIPITOR) 20 MG tablet, TAKE 1 TABLET BY MOUTH DAILY  dilTIAZem (CARDIZEM CD)

## 2024-11-27 NOTE — CONSULTS
Consult Placed     Who: leon  Date:11/27/24  Time:1715   Perfect served  Electronically signed by Tila Chung on 11/27/2024 at 5:14 PM

## 2024-11-27 NOTE — DISCHARGE INSTR - COC
Continuity of Care Form    Patient Name: Zeinab De León   :  1931  MRN:  [id number]    Admit date:  2024  Discharge date:  2024    Code Status Order: Limited   Advance Directives:   Advance Care Flowsheet Documentation             Admitting Physician:  Dada Justin DO  PCP: Fidel Black DO    Discharging Nurse: Lala Vogt RN   Discharging Hospital Unit/Room#: 0334/0334-01  Discharging Unit Phone Number: [phone]    Emergency Contact:   Extended Emergency Contact Information  Primary Emergency Contact: Werner Quinn   [facility]  Home Phone: [phone]  Relation: Child  Secondary Emergency Contact: Raphael Quinn                 Home Phone: [phone]  Relation: Other    Past Surgical History:  Past Surgical History:   Procedure Laterality Date    APPENDECTOMY      COLON SURGERY Right 16    Robotic Assisted Laparoscopic Right Colectomy    COLONOSCOPY  16    ascending colon mass, biopsy    FRACTURE SURGERY      Right ORIF ANKLE    HYSTERECTOMY (CERVIX STATUS UNKNOWN)      BSO    UPPER GASTROINTESTINAL ENDOSCOPY  16    biopsy gastric       Immunization History:   Immunization History   Administered Date(s) Administered    COVID-19, PFIZER PURPLE top, DILUTE for use, (age 12 y+), 30mcg/0.3mL 2021, 2021, 10/11/2021    Influenza Vaccine, unspecified formulation 10/02/2015    Influenza Virus Vaccine 2016    Influenza, FLUAD, (age 65 y+), IM, Quadv, 0.5mL 2023    Influenza, FLUZONE High Dose (age 65 y+), IM, Quadv, 0.7mL 2020, 2021, 2022    Influenza, FLUZONE High Dose, (age 65 y+), IM, Trivalent PF, 0.5mL 2017    PPD Test 2016, 2016    Pneumococcal, PCV-13, PREVNAR 13, (age 6w+), IM, 0.5mL 10/02/2015    Pneumococcal, PPSV23, PNEUMOVAX 23, (age 2y+), SC/IM, 0.5mL 10/22/2018       Active Problems:  Patient Active Problem List   Diagnosis Code    Hyperlipemia E78.5    Anemia D64.9    Urinary retention R33.9

## 2024-11-28 LAB
ANION GAP SERPL CALCULATED.3IONS-SCNC: 11 MMOL/L (ref 3–16)
BACTERIA BLD CULT ORG #2: NORMAL
BACTERIA BLD CULT: NORMAL
BASOPHILS # BLD: 0.1 K/UL (ref 0–0.2)
BASOPHILS NFR BLD: 1.4 %
BUN SERPL-MCNC: 8 MG/DL (ref 7–20)
CALCIUM SERPL-MCNC: 8.4 MG/DL (ref 8.3–10.6)
CHLORIDE SERPL-SCNC: 108 MMOL/L (ref 99–110)
CO2 SERPL-SCNC: 23 MMOL/L (ref 21–32)
CREAT SERPL-MCNC: 0.9 MG/DL (ref 0.6–1.2)
DEPRECATED RDW RBC AUTO: 13.9 % (ref 12.4–15.4)
EOSINOPHIL # BLD: 0.1 K/UL (ref 0–0.6)
EOSINOPHIL NFR BLD: 3.3 %
GFR SERPLBLD CREATININE-BSD FMLA CKD-EPI: 59 ML/MIN/{1.73_M2}
GLUCOSE SERPL-MCNC: 121 MG/DL (ref 70–99)
HCT VFR BLD AUTO: 38.6 % (ref 36–48)
HGB BLD-MCNC: 12.9 G/DL (ref 12–16)
INR PPP: 2.83 (ref 0.85–1.15)
LYMPHOCYTES # BLD: 0.8 K/UL (ref 1–5.1)
LYMPHOCYTES NFR BLD: 17.9 %
MAGNESIUM SERPL-MCNC: 1.46 MG/DL (ref 1.8–2.4)
MCH RBC QN AUTO: 33.5 PG (ref 26–34)
MCHC RBC AUTO-ENTMCNC: 33.3 G/DL (ref 31–36)
MCV RBC AUTO: 100.6 FL (ref 80–100)
MONOCYTES # BLD: 0.7 K/UL (ref 0–1.3)
MONOCYTES NFR BLD: 15.2 %
NEUTROPHILS # BLD: 2.8 K/UL (ref 1.7–7.7)
NEUTROPHILS NFR BLD: 62.2 %
PLATELET # BLD AUTO: 166 K/UL (ref 135–450)
PMV BLD AUTO: 8.7 FL (ref 5–10.5)
POTASSIUM SERPL-SCNC: 3.3 MMOL/L (ref 3.5–5.1)
PROTHROMBIN TIME: 29.6 SEC (ref 11.9–14.9)
RBC # BLD AUTO: 3.84 M/UL (ref 4–5.2)
SODIUM SERPL-SCNC: 142 MMOL/L (ref 136–145)
WBC # BLD AUTO: 4.4 K/UL (ref 4–11)

## 2024-11-28 PROCEDURE — 36415 COLL VENOUS BLD VENIPUNCTURE: CPT

## 2024-11-28 PROCEDURE — 85610 PROTHROMBIN TIME: CPT

## 2024-11-28 PROCEDURE — 85025 COMPLETE CBC W/AUTO DIFF WBC: CPT

## 2024-11-28 PROCEDURE — 1200000000 HC SEMI PRIVATE

## 2024-11-28 PROCEDURE — 6360000002 HC RX W HCPCS: Performed by: INTERNAL MEDICINE

## 2024-11-28 PROCEDURE — 6370000000 HC RX 637 (ALT 250 FOR IP)

## 2024-11-28 PROCEDURE — 80048 BASIC METABOLIC PNL TOTAL CA: CPT

## 2024-11-28 PROCEDURE — 6370000000 HC RX 637 (ALT 250 FOR IP): Performed by: STUDENT IN AN ORGANIZED HEALTH CARE EDUCATION/TRAINING PROGRAM

## 2024-11-28 PROCEDURE — 6370000000 HC RX 637 (ALT 250 FOR IP): Performed by: INTERNAL MEDICINE

## 2024-11-28 PROCEDURE — 2580000003 HC RX 258

## 2024-11-28 PROCEDURE — 6360000002 HC RX W HCPCS

## 2024-11-28 PROCEDURE — 83735 ASSAY OF MAGNESIUM: CPT

## 2024-11-28 RX ORDER — METRONIDAZOLE 250 MG/1
500 TABLET ORAL EVERY 8 HOURS SCHEDULED
Status: DISCONTINUED | OUTPATIENT
Start: 2024-11-28 | End: 2024-12-01

## 2024-11-28 RX ORDER — MAGNESIUM SULFATE IN WATER 40 MG/ML
2000 INJECTION, SOLUTION INTRAVENOUS PRN
Status: DISCONTINUED | OUTPATIENT
Start: 2024-11-28 | End: 2024-12-02 | Stop reason: HOSPADM

## 2024-11-28 RX ORDER — POTASSIUM CHLORIDE 1500 MG/1
20 TABLET, EXTENDED RELEASE ORAL ONCE
Status: COMPLETED | OUTPATIENT
Start: 2024-11-28 | End: 2024-11-28

## 2024-11-28 RX ORDER — PANTOPRAZOLE SODIUM 40 MG/10ML
40 INJECTION, POWDER, LYOPHILIZED, FOR SOLUTION INTRAVENOUS 2 TIMES DAILY
Status: DISCONTINUED | OUTPATIENT
Start: 2024-11-28 | End: 2024-12-02 | Stop reason: HOSPADM

## 2024-11-28 RX ORDER — SUCRALFATE 1 G/1
1 TABLET ORAL EVERY 6 HOURS SCHEDULED
Status: DISCONTINUED | OUTPATIENT
Start: 2024-11-28 | End: 2024-12-02 | Stop reason: HOSPADM

## 2024-11-28 RX ORDER — POTASSIUM CHLORIDE 1500 MG/1
40 TABLET, EXTENDED RELEASE ORAL PRN
Status: DISCONTINUED | OUTPATIENT
Start: 2024-11-28 | End: 2024-12-01

## 2024-11-28 RX ORDER — WARFARIN SODIUM 1 MG/1
1 TABLET ORAL
Status: COMPLETED | OUTPATIENT
Start: 2024-11-28 | End: 2024-11-28

## 2024-11-28 RX ORDER — POTASSIUM CHLORIDE 7.45 MG/ML
10 INJECTION INTRAVENOUS PRN
Status: DISCONTINUED | OUTPATIENT
Start: 2024-11-28 | End: 2024-12-01

## 2024-11-28 RX ORDER — ONDANSETRON 4 MG/1
4 TABLET, ORALLY DISINTEGRATING ORAL EVERY 8 HOURS
Status: DISCONTINUED | OUTPATIENT
Start: 2024-11-28 | End: 2024-12-02 | Stop reason: HOSPADM

## 2024-11-28 RX ORDER — CIPROFLOXACIN 500 MG/1
500 TABLET, FILM COATED ORAL EVERY 12 HOURS SCHEDULED
Status: DISCONTINUED | OUTPATIENT
Start: 2024-11-28 | End: 2024-12-01

## 2024-11-28 RX ORDER — LANOLIN ALCOHOL/MO/W.PET/CERES
400 CREAM (GRAM) TOPICAL ONCE
Status: COMPLETED | OUTPATIENT
Start: 2024-11-28 | End: 2024-11-28

## 2024-11-28 RX ADMIN — Medication 400 MG: at 09:45

## 2024-11-28 RX ADMIN — METRONIDAZOLE 500 MG: 250 TABLET ORAL at 15:51

## 2024-11-28 RX ADMIN — CIPROFLOXACIN HYDROCHLORIDE 500 MG: 500 TABLET, FILM COATED ORAL at 12:05

## 2024-11-28 RX ADMIN — SUCRALFATE 1 G: 1 TABLET ORAL at 18:04

## 2024-11-28 RX ADMIN — SODIUM CHLORIDE, PRESERVATIVE FREE 10 ML: 5 INJECTION INTRAVENOUS at 20:43

## 2024-11-28 RX ADMIN — ONDANSETRON 4 MG: 2 INJECTION INTRAMUSCULAR; INTRAVENOUS at 15:52

## 2024-11-28 RX ADMIN — ONDANSETRON 4 MG: 2 INJECTION INTRAMUSCULAR; INTRAVENOUS at 09:46

## 2024-11-28 RX ADMIN — METRONIDAZOLE 500 MG: 250 TABLET ORAL at 21:57

## 2024-11-28 RX ADMIN — WARFARIN SODIUM 1 MG: 1 TABLET ORAL at 18:05

## 2024-11-28 RX ADMIN — SODIUM CHLORIDE, PRESERVATIVE FREE 10 ML: 5 INJECTION INTRAVENOUS at 09:44

## 2024-11-28 RX ADMIN — METRONIDAZOLE 500 MG: 500 INJECTION, SOLUTION INTRAVENOUS at 07:49

## 2024-11-28 RX ADMIN — DILTIAZEM HYDROCHLORIDE 240 MG: 240 CAPSULE, COATED, EXTENDED RELEASE ORAL at 09:45

## 2024-11-28 RX ADMIN — POTASSIUM CHLORIDE 20 MEQ: 1500 TABLET, EXTENDED RELEASE ORAL at 09:45

## 2024-11-28 RX ADMIN — PANTOPRAZOLE SODIUM 40 MG: 40 TABLET, DELAYED RELEASE ORAL at 07:44

## 2024-11-28 RX ADMIN — PANTOPRAZOLE SODIUM 40 MG: 40 INJECTION, POWDER, FOR SOLUTION INTRAVENOUS at 12:04

## 2024-11-28 RX ADMIN — Medication 5 MG: at 20:42

## 2024-11-28 RX ADMIN — SUCRALFATE 1 G: 1 TABLET ORAL at 12:05

## 2024-11-28 RX ADMIN — ONDANSETRON 4 MG: 4 TABLET, ORALLY DISINTEGRATING ORAL at 18:04

## 2024-11-28 RX ADMIN — SODIUM CHLORIDE, PRESERVATIVE FREE 10 ML: 5 INJECTION INTRAVENOUS at 12:05

## 2024-11-28 RX ADMIN — CIPROFLOXACIN HYDROCHLORIDE 500 MG: 500 TABLET, FILM COATED ORAL at 20:43

## 2024-11-28 RX ADMIN — PANTOPRAZOLE SODIUM 40 MG: 40 INJECTION, POWDER, FOR SOLUTION INTRAVENOUS at 20:43

## 2024-11-28 RX ADMIN — ATORVASTATIN CALCIUM 20 MG: 10 TABLET, FILM COATED ORAL at 20:42

## 2024-11-28 NOTE — CONSULTS
Gastroenterology Consult Note    Patient:   Zeinab De León   :    1931   Facility:     [facility]  [facility] C3 TELE/MED SURG/ONC  [address] [facility] [address]   Referring/PCP: Fidel Black, DO  Date:     2024  Consultant:   Elliott Garcia MD    Subjective:     89-year-old female with a history of hypertension, atrial fibrillation on anticoagulation, colon cancer status post hemicolectomy presents with abdominal pain and is diagnosed with diverticulitis.  Patient also reports chronic intermittent nausea, dysphagia as well as an abnormal sensation akin to globus in her chest area on which GI is consulted after speech pathology evaluation.      Of note, CTA of the chest demonstrated a 1 cm penetrating aortic arch ulceration and cardiothoracic surgery was consulted.  Of note, the patient has also been having some neurological symptoms including dysarthria and weakness.  She reports that her dysphagia is occasional and only with certain foods such as hamburger and had this present for several months.  She also reports chronic intermittent nausea.  She also reports an ill-defined sensation in her chest that she cannot describe accurately.  Last EGD by Dr. Skelton in  demonstrated hiatal hernia.  Colonoscopy at that time demonstrated an ascending colon cancer.  Hemoglobin is normal with no overt signs of bleeding.    Past Medical History:   Diagnosis Date    Anemia 2016    Atrial fibrillation (HCC)     RESOLVED, ONE INCIDENT    Cancer (HCC) 2016    colon cancer    Hyperlipidemia     Hypertension     HAS BEEN RUNNING NORMAL    Skin cancer of forehead 2021     Past Surgical History:   Procedure Laterality Date    APPENDECTOMY      COLON SURGERY Right 16    Robotic Assisted Laparoscopic Right Colectomy    COLONOSCOPY  16    ascending colon mass, biopsy    FRACTURE SURGERY      Right ORIF ANKLE    HYSTERECTOMY (CERVIX STATUS UNKNOWN)      BSO    UPPER

## 2024-11-29 ENCOUNTER — APPOINTMENT (OUTPATIENT)
Age: 88
DRG: 392 | End: 2024-11-29
Payer: MEDICARE

## 2024-11-29 ENCOUNTER — APPOINTMENT (OUTPATIENT)
Dept: MRI IMAGING | Age: 88
DRG: 392 | End: 2024-11-29
Payer: MEDICARE

## 2024-11-29 PROBLEM — R47.9 TRANSIENT SPEECH DISTURBANCE: Status: ACTIVE | Noted: 2024-11-29

## 2024-11-29 LAB
ANION GAP SERPL CALCULATED.3IONS-SCNC: 10 MMOL/L (ref 3–16)
BASOPHILS # BLD: 0.1 K/UL (ref 0–0.2)
BASOPHILS NFR BLD: 1.7 %
BUN SERPL-MCNC: 9 MG/DL (ref 7–20)
CALCIUM SERPL-MCNC: 8.6 MG/DL (ref 8.3–10.6)
CHLORIDE SERPL-SCNC: 106 MMOL/L (ref 99–110)
CHOLEST SERPL-MCNC: 129 MG/DL (ref 0–199)
CO2 SERPL-SCNC: 24 MMOL/L (ref 21–32)
CREAT SERPL-MCNC: 0.9 MG/DL (ref 0.6–1.2)
DEPRECATED RDW RBC AUTO: 13.9 % (ref 12.4–15.4)
ECHO AO ARCH DIAM: 2.7 CM
ECHO AO ASC DIAM: 4.1 CM
ECHO AO ASCENDING AORTA INDEX: 2.63 CM/M2
ECHO AO ROOT DIAM: 3.8 CM
ECHO AO ROOT INDEX: 2.44 CM/M2
ECHO AV AREA PEAK VELOCITY: 0.7 CM2
ECHO AV AREA PLAN/BSA: 0.64 CM2/M2
ECHO AV AREA PLAN: 1 CM2
ECHO AV AREA VTI: 0.7 CM2
ECHO AV AREA/BSA PEAK VELOCITY: 0.4 CM2/M2
ECHO AV AREA/BSA VTI: 0.4 CM2/M2
ECHO AV CUSP MM: 0.9 CM
ECHO AV MEAN GRADIENT: 14 MMHG
ECHO AV MEAN VELOCITY: 1.7 M/S
ECHO AV PEAK GRADIENT: 29 MMHG
ECHO AV PEAK VELOCITY: 2.7 M/S
ECHO AV VELOCITY RATIO: 0.26
ECHO AV VTI: 62.9 CM
ECHO BSA: 1.59 M2
ECHO EST RA PRESSURE: 15 MMHG
ECHO IVC PROX: 2.9 CM
ECHO LA AREA 2C: 30.9 CM2
ECHO LA AREA 4C: 37.2 CM2
ECHO LA DIAMETER INDEX: 2.37 CM/M2
ECHO LA DIAMETER: 3.7 CM
ECHO LA MAJOR AXIS: 8.4 CM
ECHO LA MINOR AXIS: 7.7 CM
ECHO LA TO AORTIC ROOT RATIO: 0.97
ECHO LA VOL BP: 120 ML (ref 22–52)
ECHO LA VOL MOD A2C: 101 ML (ref 22–52)
ECHO LA VOL MOD A4C: 131 ML (ref 22–52)
ECHO LA VOL/BSA BIPLANE: 77 ML/M2 (ref 16–34)
ECHO LA VOLUME INDEX MOD A2C: 65 ML/M2 (ref 16–34)
ECHO LA VOLUME INDEX MOD A4C: 84 ML/M2 (ref 16–34)
ECHO LV E' SEPTAL VELOCITY: 6.24 CM/S
ECHO LV EDV A2C: 23 ML
ECHO LV EDV A4C: 42 ML
ECHO LV EDV INDEX A4C: 27 ML/M2
ECHO LV EDV NDEX A2C: 15 ML/M2
ECHO LV EF PHYSICIAN: 53 %
ECHO LV EJECTION FRACTION A2C: 50 %
ECHO LV EJECTION FRACTION A4C: 53 %
ECHO LV EJECTION FRACTION BIPLANE: 50 % (ref 55–100)
ECHO LV ESV A2C: 11 ML
ECHO LV ESV A4C: 20 ML
ECHO LV ESV INDEX A2C: 7 ML/M2
ECHO LV ESV INDEX A4C: 13 ML/M2
ECHO LV FRACTIONAL SHORTENING: 5 % (ref 28–44)
ECHO LV INTERNAL DIMENSION DIASTOLE INDEX: 2.5 CM/M2
ECHO LV INTERNAL DIMENSION DIASTOLIC: 3.9 CM (ref 3.9–5.3)
ECHO LV INTERNAL DIMENSION SYSTOLIC INDEX: 2.37 CM/M2
ECHO LV INTERNAL DIMENSION SYSTOLIC: 3.7 CM
ECHO LV ISOVOLUMETRIC RELAXATION TIME (IVRT): 92 MS
ECHO LV IVSD: 1.3 CM (ref 0.6–0.9)
ECHO LV MASS 2D: 179.7 G (ref 67–162)
ECHO LV MASS INDEX 2D: 115.2 G/M2 (ref 43–95)
ECHO LV POSTERIOR WALL DIASTOLIC: 1.3 CM (ref 0.6–0.9)
ECHO LV RELATIVE WALL THICKNESS RATIO: 0.67
ECHO LVOT AREA: 2.8 CM2
ECHO LVOT AV VTI INDEX: 0.23
ECHO LVOT DIAM: 1.9 CM
ECHO LVOT MEAN GRADIENT: 1 MMHG
ECHO LVOT PEAK GRADIENT: 2 MMHG
ECHO LVOT PEAK VELOCITY: 0.7 M/S
ECHO LVOT STROKE VOLUME INDEX: 26.2 ML/M2
ECHO LVOT SV: 40.8 ML
ECHO LVOT VTI: 14.4 CM
ECHO MV E VELOCITY: 1 M/S
ECHO MV E/E' SEPTAL: 16.03
ECHO RA AREA 4C: 33.8 CM2
ECHO RA END SYSTOLIC VOLUME APICAL 4 CHAMBER INDEX BSA: 88 ML/M2
ECHO RA VOLUME: 137 ML
ECHO RIGHT VENTRICULAR SYSTOLIC PRESSURE (RVSP): 35 MMHG
ECHO RV BASAL DIMENSION: 4.6 CM
ECHO RV EJECTION FRACTION 3D: 18 %
ECHO RV FRACTIONAL AREA CHANGE: 13 %
ECHO RV FREE WALL PEAK S': 6.9 CM/S
ECHO RV INTERNAL DIMENSION: 3.8 CM
ECHO RV LONGITUDINAL DIMENSION: 5.6 CM
ECHO RV MID DIMENSION: 4 CM
ECHO RV TAPSE: 0.8 CM (ref 1.7–?)
ECHO TV REGURGITANT MAX VELOCITY: 2.25 M/S
ECHO TV REGURGITANT PEAK GRADIENT: 20 MMHG
EOSINOPHIL # BLD: 0.2 K/UL (ref 0–0.6)
EOSINOPHIL NFR BLD: 3.5 %
GFR SERPLBLD CREATININE-BSD FMLA CKD-EPI: 59 ML/MIN/{1.73_M2}
GLUCOSE SERPL-MCNC: 111 MG/DL (ref 70–99)
HCT VFR BLD AUTO: 39.6 % (ref 36–48)
HDLC SERPL-MCNC: 48 MG/DL (ref 40–60)
HGB BLD-MCNC: 13.1 G/DL (ref 12–16)
INR PPP: 2.49 (ref 0.85–1.15)
LDLC SERPL CALC-MCNC: 67 MG/DL
LYMPHOCYTES # BLD: 0.8 K/UL (ref 1–5.1)
LYMPHOCYTES NFR BLD: 17.1 %
MAGNESIUM SERPL-MCNC: 1.49 MG/DL (ref 1.8–2.4)
MCH RBC QN AUTO: 33.3 PG (ref 26–34)
MCHC RBC AUTO-ENTMCNC: 33 G/DL (ref 31–36)
MCV RBC AUTO: 100.9 FL (ref 80–100)
MONOCYTES # BLD: 0.6 K/UL (ref 0–1.3)
MONOCYTES NFR BLD: 13.1 %
NEUTROPHILS # BLD: 3.2 K/UL (ref 1.7–7.7)
NEUTROPHILS NFR BLD: 64.6 %
PLATELET # BLD AUTO: 192 K/UL (ref 135–450)
PMV BLD AUTO: 9.1 FL (ref 5–10.5)
POTASSIUM SERPL-SCNC: 3.5 MMOL/L (ref 3.5–5.1)
PROTHROMBIN TIME: 26.9 SEC (ref 11.9–14.9)
RBC # BLD AUTO: 3.93 M/UL (ref 4–5.2)
SODIUM SERPL-SCNC: 140 MMOL/L (ref 136–145)
TRIGL SERPL-MCNC: 71 MG/DL (ref 0–150)
VLDLC SERPL CALC-MCNC: 14 MG/DL
WBC # BLD AUTO: 4.9 K/UL (ref 4–11)

## 2024-11-29 PROCEDURE — 6360000002 HC RX W HCPCS

## 2024-11-29 PROCEDURE — 6370000000 HC RX 637 (ALT 250 FOR IP): Performed by: INTERNAL MEDICINE

## 2024-11-29 PROCEDURE — 99222 1ST HOSP IP/OBS MODERATE 55: CPT | Performed by: STUDENT IN AN ORGANIZED HEALTH CARE EDUCATION/TRAINING PROGRAM

## 2024-11-29 PROCEDURE — 1200000000 HC SEMI PRIVATE

## 2024-11-29 PROCEDURE — 83036 HEMOGLOBIN GLYCOSYLATED A1C: CPT

## 2024-11-29 PROCEDURE — 85610 PROTHROMBIN TIME: CPT

## 2024-11-29 PROCEDURE — 6360000004 HC RX CONTRAST MEDICATION

## 2024-11-29 PROCEDURE — 2580000003 HC RX 258

## 2024-11-29 PROCEDURE — 83735 ASSAY OF MAGNESIUM: CPT

## 2024-11-29 PROCEDURE — 6370000000 HC RX 637 (ALT 250 FOR IP)

## 2024-11-29 PROCEDURE — 6360000002 HC RX W HCPCS: Performed by: INTERNAL MEDICINE

## 2024-11-29 PROCEDURE — 70551 MRI BRAIN STEM W/O DYE: CPT

## 2024-11-29 PROCEDURE — 36415 COLL VENOUS BLD VENIPUNCTURE: CPT

## 2024-11-29 PROCEDURE — 80048 BASIC METABOLIC PNL TOTAL CA: CPT

## 2024-11-29 PROCEDURE — 6370000000 HC RX 637 (ALT 250 FOR IP): Performed by: STUDENT IN AN ORGANIZED HEALTH CARE EDUCATION/TRAINING PROGRAM

## 2024-11-29 PROCEDURE — 85025 COMPLETE CBC W/AUTO DIFF WBC: CPT

## 2024-11-29 PROCEDURE — C8929 TTE W OR WO FOL WCON,DOPPLER: HCPCS

## 2024-11-29 PROCEDURE — APPSS45 APP SPLIT SHARED TIME 31-45 MINUTES

## 2024-11-29 PROCEDURE — 97110 THERAPEUTIC EXERCISES: CPT

## 2024-11-29 PROCEDURE — 97116 GAIT TRAINING THERAPY: CPT

## 2024-11-29 PROCEDURE — 6370000000 HC RX 637 (ALT 250 FOR IP): Performed by: NURSE PRACTITIONER

## 2024-11-29 PROCEDURE — 93306 TTE W/DOPPLER COMPLETE: CPT | Performed by: INTERNAL MEDICINE

## 2024-11-29 PROCEDURE — 80061 LIPID PANEL: CPT

## 2024-11-29 RX ORDER — MAGNESIUM SULFATE IN WATER 40 MG/ML
2000 INJECTION, SOLUTION INTRAVENOUS ONCE
Status: COMPLETED | OUTPATIENT
Start: 2024-11-29 | End: 2024-11-29

## 2024-11-29 RX ORDER — TRAMADOL HYDROCHLORIDE 50 MG/1
50 TABLET ORAL EVERY 6 HOURS PRN
Status: DISCONTINUED | OUTPATIENT
Start: 2024-11-29 | End: 2024-12-02 | Stop reason: HOSPADM

## 2024-11-29 RX ORDER — WARFARIN SODIUM 2 MG/1
2 TABLET ORAL
Status: COMPLETED | OUTPATIENT
Start: 2024-11-29 | End: 2024-11-29

## 2024-11-29 RX ADMIN — SODIUM CHLORIDE, PRESERVATIVE FREE 10 ML: 5 INJECTION INTRAVENOUS at 07:54

## 2024-11-29 RX ADMIN — SODIUM CHLORIDE, PRESERVATIVE FREE 10 ML: 5 INJECTION INTRAVENOUS at 20:03

## 2024-11-29 RX ADMIN — WARFARIN SODIUM 2 MG: 2 TABLET ORAL at 16:53

## 2024-11-29 RX ADMIN — SUCRALFATE 1 G: 1 TABLET ORAL at 12:02

## 2024-11-29 RX ADMIN — SUCRALFATE 1 G: 1 TABLET ORAL at 00:17

## 2024-11-29 RX ADMIN — METRONIDAZOLE 500 MG: 250 TABLET ORAL at 05:34

## 2024-11-29 RX ADMIN — SUCRALFATE 1 G: 1 TABLET ORAL at 05:34

## 2024-11-29 RX ADMIN — ONDANSETRON 4 MG: 4 TABLET, ORALLY DISINTEGRATING ORAL at 09:33

## 2024-11-29 RX ADMIN — ACETAMINOPHEN 650 MG: 325 TABLET ORAL at 00:21

## 2024-11-29 RX ADMIN — CIPROFLOXACIN HYDROCHLORIDE 500 MG: 500 TABLET, FILM COATED ORAL at 20:02

## 2024-11-29 RX ADMIN — ONDANSETRON 4 MG: 4 TABLET, ORALLY DISINTEGRATING ORAL at 16:53

## 2024-11-29 RX ADMIN — PERFLUTREN 1.5 ML: 6.52 INJECTION, SUSPENSION INTRAVENOUS at 10:28

## 2024-11-29 RX ADMIN — TRAMADOL HYDROCHLORIDE 50 MG: 50 TABLET, COATED ORAL at 22:50

## 2024-11-29 RX ADMIN — MAGNESIUM SULFATE HEPTAHYDRATE 2000 MG: 40 INJECTION, SOLUTION INTRAVENOUS at 09:55

## 2024-11-29 RX ADMIN — Medication 5 MG: at 20:02

## 2024-11-29 RX ADMIN — ATORVASTATIN CALCIUM 20 MG: 10 TABLET, FILM COATED ORAL at 20:02

## 2024-11-29 RX ADMIN — TRAMADOL HYDROCHLORIDE 50 MG: 50 TABLET, COATED ORAL at 16:55

## 2024-11-29 RX ADMIN — METRONIDAZOLE 500 MG: 250 TABLET ORAL at 22:48

## 2024-11-29 RX ADMIN — TRAMADOL HYDROCHLORIDE 50 MG: 50 TABLET, COATED ORAL at 02:22

## 2024-11-29 RX ADMIN — CIPROFLOXACIN HYDROCHLORIDE 500 MG: 500 TABLET, FILM COATED ORAL at 07:54

## 2024-11-29 RX ADMIN — METRONIDAZOLE 500 MG: 250 TABLET ORAL at 14:40

## 2024-11-29 RX ADMIN — ONDANSETRON 4 MG: 4 TABLET, ORALLY DISINTEGRATING ORAL at 02:23

## 2024-11-29 RX ADMIN — PANTOPRAZOLE SODIUM 40 MG: 40 INJECTION, POWDER, FOR SOLUTION INTRAVENOUS at 20:02

## 2024-11-29 RX ADMIN — TRAMADOL HYDROCHLORIDE 50 MG: 50 TABLET, COATED ORAL at 09:32

## 2024-11-29 RX ADMIN — DILTIAZEM HYDROCHLORIDE 240 MG: 240 CAPSULE, COATED, EXTENDED RELEASE ORAL at 07:54

## 2024-11-29 RX ADMIN — SUCRALFATE 1 G: 1 TABLET ORAL at 16:53

## 2024-11-29 RX ADMIN — ONDANSETRON 4 MG: 2 INJECTION INTRAMUSCULAR; INTRAVENOUS at 18:09

## 2024-11-29 RX ADMIN — SUCRALFATE 1 G: 1 TABLET ORAL at 23:57

## 2024-11-29 RX ADMIN — PANTOPRAZOLE SODIUM 40 MG: 40 INJECTION, POWDER, FOR SOLUTION INTRAVENOUS at 07:54

## 2024-11-29 ASSESSMENT — PAIN SCALES - GENERAL
PAINLEVEL_OUTOF10: 6
PAINLEVEL_OUTOF10: 6
PAINLEVEL_OUTOF10: 7
PAINLEVEL_OUTOF10: 6
PAINLEVEL_OUTOF10: 7

## 2024-11-29 ASSESSMENT — PAIN DESCRIPTION - DESCRIPTORS
DESCRIPTORS: ACHING
DESCRIPTORS: DISCOMFORT
DESCRIPTORS: DISCOMFORT

## 2024-11-29 ASSESSMENT — PAIN DESCRIPTION - LOCATION
LOCATION: LEG
LOCATION: ABDOMEN
LOCATION: ABDOMEN
LOCATION: LEG
LOCATION: ABDOMEN
LOCATION: ABDOMEN

## 2024-11-29 ASSESSMENT — PAIN DESCRIPTION - ORIENTATION
ORIENTATION: LOWER
ORIENTATION: RIGHT;LOWER

## 2024-11-29 ASSESSMENT — PAIN - FUNCTIONAL ASSESSMENT: PAIN_FUNCTIONAL_ASSESSMENT: PREVENTS OR INTERFERES SOME ACTIVE ACTIVITIES AND ADLS

## 2024-11-29 ASSESSMENT — PAIN DESCRIPTION - ONSET: ONSET: ON-GOING

## 2024-11-29 ASSESSMENT — PAIN DESCRIPTION - FREQUENCY: FREQUENCY: INTERMITTENT

## 2024-11-29 ASSESSMENT — PAIN DESCRIPTION - PAIN TYPE: TYPE: ACUTE PAIN

## 2024-11-29 NOTE — CONSULTS
In patient Neurology consult        [facility] Neurology            Zeinab De León  1/2/1931    Date of Service: 11/29/2024    Referring Physician: Hi Ruvalcaba MD      Reason for the consult and CC: L weakness, dysarthria    HPI:   The patient is a 89 y.o.  years old female with a prior medical history of A-fib, colon cancer, hyperlipidemia, hypertension who presents to the hospital on 11/24 and was admitted with diverticulitis and UTI.  It was documented on 11/27 that patient was having left upper extremity weakness and dysarthria.  Is unclear exactly when the symptoms started, however patient states that it happened at some point during her admission and that she was not having these symptoms prior.  A CT head was completed which revealed no acute findings and a CTA head/neck revealed a 1 cm aortic arch penetrating plaque, otherwise no acute findings. Patient does report that for the last several months she has had intermittent left upper extremity, left lower extremity tingling. However she does not have this symptom at the time of my exam. At the time of my exam, patient has slight dysarthria but is otherwise asymptomatic.  Pt is unsure when her symptoms resolved.       Constitutional:   Vitals:    11/29/24 0222 11/29/24 0536 11/29/24 0752 11/29/24 1233   BP:  (!) 142/84 (!) 141/90 126/70   Pulse:  67 88 82   Resp: 16 16 16 16   Temp:  98 °F (36.7 °C) 97.9 °F (36.6 °C) 97.8 °F (36.6 °C)   TempSrc:  Oral Oral Oral   SpO2:  91% 91% 90%   Weight:       Height:             I personally reviewed and updated social history, past medical history, medications, allergy, surgical history, and family history as documented in the patient's electronic health records.       ROS: 10-14 ROS reviewed with the patient/nurse/family which were unremarkable except mentioned in H&P.    General appearance:  Normal development and appear in no acute distress.   Mental Status:   Oriented to person, place, problem, and

## 2024-11-30 ENCOUNTER — APPOINTMENT (OUTPATIENT)
Dept: CT IMAGING | Age: 88
DRG: 392 | End: 2024-11-30
Payer: MEDICARE

## 2024-11-30 LAB
ANION GAP SERPL CALCULATED.3IONS-SCNC: 10 MMOL/L (ref 3–16)
BASOPHILS # BLD: 0.1 K/UL (ref 0–0.2)
BASOPHILS NFR BLD: 1.1 %
BUN SERPL-MCNC: 9 MG/DL (ref 7–20)
CALCIUM SERPL-MCNC: 9.3 MG/DL (ref 8.3–10.6)
CHLORIDE SERPL-SCNC: 104 MMOL/L (ref 99–110)
CO2 SERPL-SCNC: 24 MMOL/L (ref 21–32)
CREAT SERPL-MCNC: 0.9 MG/DL (ref 0.6–1.2)
DEPRECATED RDW RBC AUTO: 14.1 % (ref 12.4–15.4)
EOSINOPHIL # BLD: 0.2 K/UL (ref 0–0.6)
EOSINOPHIL NFR BLD: 3.3 %
EST. AVERAGE GLUCOSE BLD GHB EST-MCNC: 139.9 MG/DL
GFR SERPLBLD CREATININE-BSD FMLA CKD-EPI: 59 ML/MIN/{1.73_M2}
GLUCOSE SERPL-MCNC: 108 MG/DL (ref 70–99)
HBA1C MFR BLD: 6.5 %
HCT VFR BLD AUTO: 37.2 % (ref 36–48)
HGB BLD-MCNC: 12.4 G/DL (ref 12–16)
INR PPP: 2.69 (ref 0.85–1.15)
LYMPHOCYTES # BLD: 0.7 K/UL (ref 1–5.1)
LYMPHOCYTES NFR BLD: 15.8 %
MCH RBC QN AUTO: 33.9 PG (ref 26–34)
MCHC RBC AUTO-ENTMCNC: 33.3 G/DL (ref 31–36)
MCV RBC AUTO: 101.9 FL (ref 80–100)
MONOCYTES # BLD: 0.6 K/UL (ref 0–1.3)
MONOCYTES NFR BLD: 13.6 %
NEUTROPHILS # BLD: 3.1 K/UL (ref 1.7–7.7)
NEUTROPHILS NFR BLD: 66.2 %
PLATELET # BLD AUTO: 178 K/UL (ref 135–450)
PMV BLD AUTO: 8.6 FL (ref 5–10.5)
POTASSIUM SERPL-SCNC: 3.6 MMOL/L (ref 3.5–5.1)
PROTHROMBIN TIME: 28.5 SEC (ref 11.9–14.9)
RBC # BLD AUTO: 3.65 M/UL (ref 4–5.2)
SODIUM SERPL-SCNC: 138 MMOL/L (ref 136–145)
WBC # BLD AUTO: 4.7 K/UL (ref 4–11)

## 2024-11-30 PROCEDURE — 2580000003 HC RX 258

## 2024-11-30 PROCEDURE — 6370000000 HC RX 637 (ALT 250 FOR IP): Performed by: INTERNAL MEDICINE

## 2024-11-30 PROCEDURE — 6370000000 HC RX 637 (ALT 250 FOR IP)

## 2024-11-30 PROCEDURE — 6370000000 HC RX 637 (ALT 250 FOR IP): Performed by: STUDENT IN AN ORGANIZED HEALTH CARE EDUCATION/TRAINING PROGRAM

## 2024-11-30 PROCEDURE — 6360000002 HC RX W HCPCS

## 2024-11-30 PROCEDURE — 36415 COLL VENOUS BLD VENIPUNCTURE: CPT

## 2024-11-30 PROCEDURE — 74176 CT ABD & PELVIS W/O CONTRAST: CPT

## 2024-11-30 PROCEDURE — 85025 COMPLETE CBC W/AUTO DIFF WBC: CPT

## 2024-11-30 PROCEDURE — 80048 BASIC METABOLIC PNL TOTAL CA: CPT

## 2024-11-30 PROCEDURE — 85610 PROTHROMBIN TIME: CPT

## 2024-11-30 PROCEDURE — 6360000002 HC RX W HCPCS: Performed by: INTERNAL MEDICINE

## 2024-11-30 PROCEDURE — 1200000000 HC SEMI PRIVATE

## 2024-11-30 RX ORDER — WARFARIN SODIUM 2 MG/1
2 TABLET ORAL
Status: COMPLETED | OUTPATIENT
Start: 2024-11-30 | End: 2024-11-30

## 2024-11-30 RX ORDER — PROCHLORPERAZINE EDISYLATE 5 MG/ML
10 INJECTION INTRAMUSCULAR; INTRAVENOUS EVERY 6 HOURS PRN
Status: DISCONTINUED | OUTPATIENT
Start: 2024-11-30 | End: 2024-12-02 | Stop reason: HOSPADM

## 2024-11-30 RX ADMIN — SUCRALFATE 1 G: 1 TABLET ORAL at 18:11

## 2024-11-30 RX ADMIN — METRONIDAZOLE 500 MG: 250 TABLET ORAL at 06:01

## 2024-11-30 RX ADMIN — SODIUM CHLORIDE, PRESERVATIVE FREE 10 ML: 5 INJECTION INTRAVENOUS at 09:09

## 2024-11-30 RX ADMIN — METRONIDAZOLE 500 MG: 250 TABLET ORAL at 13:51

## 2024-11-30 RX ADMIN — DILTIAZEM HYDROCHLORIDE 240 MG: 240 CAPSULE, COATED, EXTENDED RELEASE ORAL at 09:09

## 2024-11-30 RX ADMIN — CIPROFLOXACIN HYDROCHLORIDE 500 MG: 500 TABLET, FILM COATED ORAL at 09:09

## 2024-11-30 RX ADMIN — WARFARIN SODIUM 2 MG: 2 TABLET ORAL at 18:11

## 2024-11-30 RX ADMIN — ONDANSETRON 4 MG: 4 TABLET, ORALLY DISINTEGRATING ORAL at 18:11

## 2024-11-30 RX ADMIN — Medication 5 MG: at 20:22

## 2024-11-30 RX ADMIN — SUCRALFATE 1 G: 1 TABLET ORAL at 12:02

## 2024-11-30 RX ADMIN — CIPROFLOXACIN HYDROCHLORIDE 500 MG: 500 TABLET, FILM COATED ORAL at 20:22

## 2024-11-30 RX ADMIN — PANTOPRAZOLE SODIUM 40 MG: 40 INJECTION, POWDER, FOR SOLUTION INTRAVENOUS at 20:22

## 2024-11-30 RX ADMIN — SUCRALFATE 1 G: 1 TABLET ORAL at 06:01

## 2024-11-30 RX ADMIN — SODIUM CHLORIDE, PRESERVATIVE FREE 10 ML: 5 INJECTION INTRAVENOUS at 20:22

## 2024-11-30 RX ADMIN — ONDANSETRON 4 MG: 4 TABLET, ORALLY DISINTEGRATING ORAL at 09:09

## 2024-11-30 RX ADMIN — SODIUM CHLORIDE, PRESERVATIVE FREE 10 ML: 5 INJECTION INTRAVENOUS at 13:52

## 2024-11-30 RX ADMIN — SUCRALFATE 1 G: 1 TABLET ORAL at 23:52

## 2024-11-30 RX ADMIN — METRONIDAZOLE 500 MG: 250 TABLET ORAL at 22:20

## 2024-11-30 RX ADMIN — PANTOPRAZOLE SODIUM 40 MG: 40 INJECTION, POWDER, FOR SOLUTION INTRAVENOUS at 09:09

## 2024-11-30 RX ADMIN — ATORVASTATIN CALCIUM 20 MG: 10 TABLET, FILM COATED ORAL at 20:22

## 2024-11-30 RX ADMIN — ONDANSETRON 4 MG: 2 INJECTION INTRAMUSCULAR; INTRAVENOUS at 13:51

## 2024-11-30 ASSESSMENT — PAIN SCALES - GENERAL: PAINLEVEL_OUTOF10: 7

## 2024-11-30 ASSESSMENT — PAIN DESCRIPTION - DESCRIPTORS: DESCRIPTORS: ACHING

## 2024-11-30 ASSESSMENT — PAIN DESCRIPTION - LOCATION: LOCATION: ABDOMEN

## 2024-11-30 ASSESSMENT — PAIN DESCRIPTION - ORIENTATION: ORIENTATION: RIGHT

## 2024-12-01 LAB
ANION GAP SERPL CALCULATED.3IONS-SCNC: 10 MMOL/L (ref 3–16)
BASOPHILS # BLD: 0.1 K/UL (ref 0–0.2)
BASOPHILS NFR BLD: 1.2 %
BUN SERPL-MCNC: 9 MG/DL (ref 7–20)
CALCIUM SERPL-MCNC: 8.8 MG/DL (ref 8.3–10.6)
CHLORIDE SERPL-SCNC: 103 MMOL/L (ref 99–110)
CO2 SERPL-SCNC: 25 MMOL/L (ref 21–32)
CREAT SERPL-MCNC: 1 MG/DL (ref 0.6–1.2)
DEPRECATED RDW RBC AUTO: 13.8 % (ref 12.4–15.4)
EOSINOPHIL # BLD: 0.2 K/UL (ref 0–0.6)
EOSINOPHIL NFR BLD: 3.2 %
GFR SERPLBLD CREATININE-BSD FMLA CKD-EPI: 52 ML/MIN/{1.73_M2}
GLUCOSE SERPL-MCNC: 108 MG/DL (ref 70–99)
HCT VFR BLD AUTO: 36.8 % (ref 36–48)
HGB BLD-MCNC: 12.2 G/DL (ref 12–16)
INR PPP: 2.89 (ref 0.85–1.15)
LYMPHOCYTES # BLD: 0.7 K/UL (ref 1–5.1)
LYMPHOCYTES NFR BLD: 15.8 %
MAGNESIUM SERPL-MCNC: 1.46 MG/DL (ref 1.8–2.4)
MCH RBC QN AUTO: 33.3 PG (ref 26–34)
MCHC RBC AUTO-ENTMCNC: 33.1 G/DL (ref 31–36)
MCV RBC AUTO: 100.6 FL (ref 80–100)
MONOCYTES # BLD: 0.6 K/UL (ref 0–1.3)
MONOCYTES NFR BLD: 12.6 %
NEUTROPHILS # BLD: 3.2 K/UL (ref 1.7–7.7)
NEUTROPHILS NFR BLD: 67.2 %
PLATELET # BLD AUTO: 177 K/UL (ref 135–450)
PMV BLD AUTO: 8.9 FL (ref 5–10.5)
POTASSIUM SERPL-SCNC: 3.3 MMOL/L (ref 3.5–5.1)
PROTHROMBIN TIME: 30.2 SEC (ref 11.9–14.9)
RBC # BLD AUTO: 3.66 M/UL (ref 4–5.2)
SODIUM SERPL-SCNC: 138 MMOL/L (ref 136–145)
WBC # BLD AUTO: 4.7 K/UL (ref 4–11)

## 2024-12-01 PROCEDURE — 6370000000 HC RX 637 (ALT 250 FOR IP)

## 2024-12-01 PROCEDURE — 80048 BASIC METABOLIC PNL TOTAL CA: CPT

## 2024-12-01 PROCEDURE — 6370000000 HC RX 637 (ALT 250 FOR IP): Performed by: INTERNAL MEDICINE

## 2024-12-01 PROCEDURE — 97110 THERAPEUTIC EXERCISES: CPT

## 2024-12-01 PROCEDURE — 85610 PROTHROMBIN TIME: CPT

## 2024-12-01 PROCEDURE — 2580000003 HC RX 258

## 2024-12-01 PROCEDURE — 85025 COMPLETE CBC W/AUTO DIFF WBC: CPT

## 2024-12-01 PROCEDURE — 97535 SELF CARE MNGMENT TRAINING: CPT

## 2024-12-01 PROCEDURE — 83735 ASSAY OF MAGNESIUM: CPT

## 2024-12-01 PROCEDURE — 36415 COLL VENOUS BLD VENIPUNCTURE: CPT

## 2024-12-01 PROCEDURE — 6360000002 HC RX W HCPCS: Performed by: INTERNAL MEDICINE

## 2024-12-01 PROCEDURE — 1200000000 HC SEMI PRIVATE

## 2024-12-01 PROCEDURE — 6370000000 HC RX 637 (ALT 250 FOR IP): Performed by: STUDENT IN AN ORGANIZED HEALTH CARE EDUCATION/TRAINING PROGRAM

## 2024-12-01 RX ORDER — POTASSIUM CHLORIDE 1500 MG/1
20 TABLET, EXTENDED RELEASE ORAL 2 TIMES DAILY
Status: COMPLETED | OUTPATIENT
Start: 2024-12-01 | End: 2024-12-01

## 2024-12-01 RX ORDER — LANOLIN ALCOHOL/MO/W.PET/CERES
400 CREAM (GRAM) TOPICAL 2 TIMES DAILY
Status: COMPLETED | OUTPATIENT
Start: 2024-12-01 | End: 2024-12-01

## 2024-12-01 RX ORDER — WARFARIN SODIUM 1 MG/1
1 TABLET ORAL
Status: COMPLETED | OUTPATIENT
Start: 2024-12-01 | End: 2024-12-01

## 2024-12-01 RX ADMIN — SUCRALFATE 1 G: 1 TABLET ORAL at 18:19

## 2024-12-01 RX ADMIN — DILTIAZEM HYDROCHLORIDE 240 MG: 240 CAPSULE, COATED, EXTENDED RELEASE ORAL at 08:31

## 2024-12-01 RX ADMIN — WARFARIN SODIUM 1 MG: 1 TABLET ORAL at 18:19

## 2024-12-01 RX ADMIN — CIPROFLOXACIN HYDROCHLORIDE 500 MG: 500 TABLET, FILM COATED ORAL at 08:31

## 2024-12-01 RX ADMIN — ONDANSETRON 4 MG: 4 TABLET, ORALLY DISINTEGRATING ORAL at 09:27

## 2024-12-01 RX ADMIN — Medication 400 MG: at 20:05

## 2024-12-01 RX ADMIN — ONDANSETRON 4 MG: 4 TABLET, ORALLY DISINTEGRATING ORAL at 18:19

## 2024-12-01 RX ADMIN — POTASSIUM CHLORIDE 20 MEQ: 1500 TABLET, EXTENDED RELEASE ORAL at 09:27

## 2024-12-01 RX ADMIN — SUCRALFATE 1 G: 1 TABLET ORAL at 12:14

## 2024-12-01 RX ADMIN — ONDANSETRON 4 MG: 4 TABLET, ORALLY DISINTEGRATING ORAL at 05:10

## 2024-12-01 RX ADMIN — POTASSIUM CHLORIDE 20 MEQ: 1500 TABLET, EXTENDED RELEASE ORAL at 20:05

## 2024-12-01 RX ADMIN — PANTOPRAZOLE SODIUM 40 MG: 40 INJECTION, POWDER, FOR SOLUTION INTRAVENOUS at 20:05

## 2024-12-01 RX ADMIN — PANTOPRAZOLE SODIUM 40 MG: 40 INJECTION, POWDER, FOR SOLUTION INTRAVENOUS at 08:31

## 2024-12-01 RX ADMIN — ATORVASTATIN CALCIUM 20 MG: 10 TABLET, FILM COATED ORAL at 20:05

## 2024-12-01 RX ADMIN — SODIUM CHLORIDE, PRESERVATIVE FREE 10 ML: 5 INJECTION INTRAVENOUS at 20:05

## 2024-12-01 RX ADMIN — Medication 400 MG: at 09:27

## 2024-12-01 RX ADMIN — SUCRALFATE 1 G: 1 TABLET ORAL at 05:10

## 2024-12-01 RX ADMIN — METRONIDAZOLE 500 MG: 250 TABLET ORAL at 05:10

## 2024-12-01 RX ADMIN — Medication 5 MG: at 20:05

## 2024-12-01 RX ADMIN — SODIUM CHLORIDE, PRESERVATIVE FREE 10 ML: 5 INJECTION INTRAVENOUS at 08:31

## 2024-12-02 VITALS
WEIGHT: 132 LBS | OXYGEN SATURATION: 91 % | SYSTOLIC BLOOD PRESSURE: 161 MMHG | BODY MASS INDEX: 25.91 KG/M2 | TEMPERATURE: 98.1 F | HEART RATE: 92 BPM | HEIGHT: 60 IN | DIASTOLIC BLOOD PRESSURE: 89 MMHG | RESPIRATION RATE: 16 BRPM

## 2024-12-02 PROBLEM — E44.0 MODERATE PROTEIN-CALORIE MALNUTRITION (HCC): Status: ACTIVE | Noted: 2024-12-02

## 2024-12-02 LAB
ANION GAP SERPL CALCULATED.3IONS-SCNC: 8 MMOL/L (ref 3–16)
BASOPHILS # BLD: 0.1 K/UL (ref 0–0.2)
BASOPHILS NFR BLD: 2.4 %
BUN SERPL-MCNC: 10 MG/DL (ref 7–20)
CALCIUM SERPL-MCNC: 8.9 MG/DL (ref 8.3–10.6)
CHLORIDE SERPL-SCNC: 105 MMOL/L (ref 99–110)
CO2 SERPL-SCNC: 28 MMOL/L (ref 21–32)
CREAT SERPL-MCNC: 1.1 MG/DL (ref 0.6–1.2)
DEPRECATED RDW RBC AUTO: 14.1 % (ref 12.4–15.4)
EOSINOPHIL # BLD: 0.1 K/UL (ref 0–0.6)
EOSINOPHIL NFR BLD: 2.9 %
GFR SERPLBLD CREATININE-BSD FMLA CKD-EPI: 47 ML/MIN/{1.73_M2}
GLUCOSE SERPL-MCNC: 106 MG/DL (ref 70–99)
HCT VFR BLD AUTO: 36.9 % (ref 36–48)
HGB BLD-MCNC: 12.4 G/DL (ref 12–16)
INR PPP: 2.71 (ref 0.85–1.15)
LYMPHOCYTES # BLD: 0.9 K/UL (ref 1–5.1)
LYMPHOCYTES NFR BLD: 16.7 %
MCH RBC QN AUTO: 33.9 PG (ref 26–34)
MCHC RBC AUTO-ENTMCNC: 33.7 G/DL (ref 31–36)
MCV RBC AUTO: 100.7 FL (ref 80–100)
MONOCYTES # BLD: 0.8 K/UL (ref 0–1.3)
MONOCYTES NFR BLD: 15.2 %
NEUTROPHILS # BLD: 3.2 K/UL (ref 1.7–7.7)
NEUTROPHILS NFR BLD: 62.8 %
PLATELET # BLD AUTO: 184 K/UL (ref 135–450)
PMV BLD AUTO: 9.2 FL (ref 5–10.5)
POTASSIUM SERPL-SCNC: 3.6 MMOL/L (ref 3.5–5.1)
PROTHROMBIN TIME: 28.7 SEC (ref 11.9–14.9)
RBC # BLD AUTO: 3.67 M/UL (ref 4–5.2)
SODIUM SERPL-SCNC: 141 MMOL/L (ref 136–145)
WBC # BLD AUTO: 5.1 K/UL (ref 4–11)

## 2024-12-02 PROCEDURE — 6370000000 HC RX 637 (ALT 250 FOR IP)

## 2024-12-02 PROCEDURE — 6360000002 HC RX W HCPCS: Performed by: INTERNAL MEDICINE

## 2024-12-02 PROCEDURE — 2580000003 HC RX 258

## 2024-12-02 PROCEDURE — 6370000000 HC RX 637 (ALT 250 FOR IP): Performed by: INTERNAL MEDICINE

## 2024-12-02 PROCEDURE — 85610 PROTHROMBIN TIME: CPT

## 2024-12-02 PROCEDURE — 36415 COLL VENOUS BLD VENIPUNCTURE: CPT

## 2024-12-02 PROCEDURE — 85025 COMPLETE CBC W/AUTO DIFF WBC: CPT

## 2024-12-02 PROCEDURE — 99231 SBSQ HOSP IP/OBS SF/LOW 25: CPT | Performed by: STUDENT IN AN ORGANIZED HEALTH CARE EDUCATION/TRAINING PROGRAM

## 2024-12-02 PROCEDURE — 80048 BASIC METABOLIC PNL TOTAL CA: CPT

## 2024-12-02 PROCEDURE — APPSS30 APP SPLIT SHARED TIME 16-30 MINUTES

## 2024-12-02 RX ORDER — WARFARIN SODIUM 2 MG/1
2 TABLET ORAL
Status: DISCONTINUED | OUTPATIENT
Start: 2024-12-02 | End: 2024-12-02 | Stop reason: HOSPADM

## 2024-12-02 RX ADMIN — SUCRALFATE 1 G: 1 TABLET ORAL at 00:11

## 2024-12-02 RX ADMIN — SUCRALFATE 1 G: 1 TABLET ORAL at 05:47

## 2024-12-02 RX ADMIN — SODIUM CHLORIDE, PRESERVATIVE FREE 10 ML: 5 INJECTION INTRAVENOUS at 08:28

## 2024-12-02 RX ADMIN — ONDANSETRON 4 MG: 4 TABLET, ORALLY DISINTEGRATING ORAL at 03:57

## 2024-12-02 RX ADMIN — PANTOPRAZOLE SODIUM 40 MG: 40 INJECTION, POWDER, FOR SOLUTION INTRAVENOUS at 08:28

## 2024-12-02 RX ADMIN — ONDANSETRON 4 MG: 4 TABLET, ORALLY DISINTEGRATING ORAL at 08:28

## 2024-12-02 RX ADMIN — DILTIAZEM HYDROCHLORIDE 240 MG: 240 CAPSULE, COATED, EXTENDED RELEASE ORAL at 08:28

## 2024-12-02 NOTE — DISCHARGE INSTRUCTIONS
Discharge Instructions  You were admitted to [facility] with Diverticulitis.     You are now ready to discharge and will be discharging to: SNF    Please take your medications as prescribed. Medication changes are listed below and a full list of medications is in this discharge packet. Please keep your follow-up appointments after the hospital for ongoing care. It has been a pleasure taking care of you, we wish you the best.     MEDICATION CHANGES:  -- none    FOLLOW-UP:  -- It is important to see your primary care provider (PCP) after being in the hospital. Your PCP is Dr. Fidel Black and the phone number of their office is [phone].   -- Dr. Sierra Ramirez, Gastroenterology: [phone]   
Wheelchair/Stroller

## 2024-12-02 NOTE — PLAN OF CARE
Problem: Safety - Adult  Goal: Free from fall injury  11/25/2024 2126 by Polly Black RN  Outcome: Progressing  11/25/2024 1736 by Carolina Ramirez RN  Outcome: Progressing  Flowsheets (Taken 11/25/2024 1736)  Free From Fall Injury: Instruct family/caregiver on patient safety     Problem: Skin/Tissue Integrity  Goal: Absence of new skin breakdown  Description: 1.  Monitor for areas of redness and/or skin breakdown  2.  Assess vascular access sites hourly  3.  Every 4-6 hours minimum:  Change oxygen saturation probe site  4.  Every 4-6 hours:  If on nasal continuous positive airway pressure, respiratory therapy assess nares and determine need for appliance change or resting period.  Outcome: Progressing     Problem: ABCDS Injury Assessment  Goal: Absence of physical injury  11/25/2024 2126 by Polly Black RN  Outcome: Progressing  11/25/2024 1736 by Carolina Ramirez RN  Outcome: Progressing  Flowsheets (Taken 11/25/2024 1736)  Absence of Physical Injury: Implement safety measures based on patient assessment     
  Problem: Safety - Adult  Goal: Free from fall injury  11/26/2024 1041 by Erin Leyva, RN  Outcome: Progressing  11/25/2024 2126 by Polly Black RN  Outcome: Progressing     Problem: Skin/Tissue Integrity  Goal: Absence of new skin breakdown  Description: 1.  Monitor for areas of redness and/or skin breakdown  2.  Assess vascular access sites hourly  3.  Every 4-6 hours minimum:  Change oxygen saturation probe site  4.  Every 4-6 hours:  If on nasal continuous positive airway pressure, respiratory therapy assess nares and determine need for appliance change or resting period.  11/26/2024 1041 by Erin Leyva, RN  Outcome: Progressing  11/25/2024 2126 by Polly Black RN  Outcome: Progressing     Problem: ABCDS Injury Assessment  Goal: Absence of physical injury  11/26/2024 1041 by Erin Leyva, RN  Outcome: Progressing  11/25/2024 2126 by Polly Black RN  Outcome: Progressing     
  Problem: Safety - Adult  Goal: Free from fall injury  11/29/2024 1003 by Amy Gan, RN  Outcome: Progressing  11/29/2024 0037 by Mario Brar RN  Outcome: Progressing     Problem: Skin/Tissue Integrity  Goal: Absence of new skin breakdown  Description: 1.  Monitor for areas of redness and/or skin breakdown  2.  Assess vascular access sites hourly  3.  Every 4-6 hours minimum:  Change oxygen saturation probe site  4.  Every 4-6 hours:  If on nasal continuous positive airway pressure, respiratory therapy assess nares and determine need for appliance change or resting period.  11/29/2024 1003 by Amy Gan, RN  Outcome: Progressing  11/29/2024 0037 by Mario Brar RN  Outcome: Progressing     Problem: ABCDS Injury Assessment  Goal: Absence of physical injury  11/29/2024 1003 by Amy Gan, RN  Outcome: Progressing  11/29/2024 0037 by Mario Brar RN  Outcome: Progressing     
  Problem: Safety - Adult  Goal: Free from fall injury  11/30/2024 1145 by Frances Lemus, RN  Outcome: Progressing  Flowsheets (Taken 11/30/2024 1145)  Free From Fall Injury: Instruct family/caregiver on patient safety     Problem: Skin/Tissue Integrity  Goal: Absence of new skin breakdown  Description: 1.  Monitor for areas of redness and/or skin breakdown  2.  Assess vascular access sites hourly  3.  Every 4-6 hours minimum:  Change oxygen saturation probe site  4.  Every 4-6 hours:  If on nasal continuous positive airway pressure, respiratory therapy assess nares and determine need for appliance change or resting period.  11/30/2024 1145 by Frances Lemus RN  Outcome: Progressing     Problem: ABCDS Injury Assessment  Goal: Absence of physical injury  11/30/2024 1145 by Frances Lemus RN  Outcome: Progressing  Flowsheets (Taken 11/30/2024 1145)  Absence of Physical Injury: Implement safety measures based on patient assessment     Problem: Pain  Goal: Verbalizes/displays adequate comfort level or baseline comfort level  11/30/2024 1145 by Frances Lemus, RN  Outcome: Progressing  Flowsheets (Taken 11/30/2024 1145)  Verbalizes/displays adequate comfort level or baseline comfort level:   Encourage patient to monitor pain and request assistance   Assess pain using appropriate pain scale     
  Problem: Safety - Adult  Goal: Free from fall injury  11/30/2024 2029 by Celine Cross RN  Outcome: Progressing  Flowsheets (Taken 11/30/2024 1145 by Frances Lemus, RN)  Free From Fall Injury: Instruct family/caregiver on patient safety     Problem: Skin/Tissue Integrity  Goal: Absence of new skin breakdown  Description: 1.  Monitor for areas of redness and/or skin breakdown  2.  Assess vascular access sites hourly  3.  Every 4-6 hours minimum:  Change oxygen saturation probe site  4.  Every 4-6 hours:  If on nasal continuous positive airway pressure, respiratory therapy assess nares and determine need for appliance change or resting period.  11/30/2024 2029 by Celine Cross RN  Outcome: Progressing     Problem: ABCDS Injury Assessment  Goal: Absence of physical injury  11/30/2024 2029 by Celine Cross RN  Outcome: Progressing  Flowsheets (Taken 11/30/2024 1145 by Frances Lemus, RN)  Absence of Physical Injury: Implement safety measures based on patient assessment     Problem: Pain  Goal: Verbalizes/displays adequate comfort level or baseline comfort level  11/30/2024 2029 by Celine Cross RN  Outcome: Progressing  Flowsheets (Taken 11/30/2024 1145 by Frances Lemus, RN)  Verbalizes/displays adequate comfort level or baseline comfort level:   Encourage patient to monitor pain and request assistance   Assess pain using appropriate pain scale     
  Problem: Safety - Adult  Goal: Free from fall injury  12/1/2024 0726 by Frances Lemus, RN  Outcome: Progressing     Problem: Skin/Tissue Integrity  Goal: Absence of new skin breakdown  Description: 1.  Monitor for areas of redness and/or skin breakdown  2.  Assess vascular access sites hourly  3.  Every 4-6 hours minimum:  Change oxygen saturation probe site  4.  Every 4-6 hours:  If on nasal continuous positive airway pressure, respiratory therapy assess nares and determine need for appliance change or resting period.  12/1/2024 0726 by Frances Lemus, RN  Outcome: Progressing     Problem: ABCDS Injury Assessment  Goal: Absence of physical injury  12/1/2024 0726 by Frances Lemus, RN  Outcome: Progressing     Problem: Pain  Goal: Verbalizes/displays adequate comfort level or baseline comfort level  12/1/2024 0726 by Frances Lemus, RN  Outcome: Progressing     
  Problem: Safety - Adult  Goal: Free from fall injury  12/2/2024 1232 by Lala Vogt RN  Outcome: Adequate for Discharge     Problem: Skin/Tissue Integrity  Goal: Absence of new skin breakdown  12/2/2024 1232 by Lala Vogt RN  Outcome: Adequate for Discharge       Problem: ABCDS Injury Assessment  Goal: Absence of physical injury  12/2/2024 1232 by Lala Vogt RN  Outcome: Adequate for Discharge       Problem: Pain  Goal: Verbalizes/displays adequate comfort level or baseline comfort level  12/2/2024 1232 by Lala Vogt RN  Outcome: Adequate for Discharge       Problem: Discharge Planning  Goal: Discharge to home or other facility with appropriate resources  Outcome: Adequate for Discharge     
  Problem: Safety - Adult  Goal: Free from fall injury  Outcome: Progressing     Problem: Skin/Tissue Integrity  Goal: Absence of new skin breakdown  Description: 1.  Monitor for areas of redness and/or skin breakdown  2.  Assess vascular access sites hourly  3.  Every 4-6 hours minimum:  Change oxygen saturation probe site  4.  Every 4-6 hours:  If on nasal continuous positive airway pressure, respiratory therapy assess nares and determine need for appliance change or resting period.  Outcome: Progressing     
  Problem: Safety - Adult  Goal: Free from fall injury  Outcome: Progressing     Problem: Skin/Tissue Integrity  Goal: Absence of new skin breakdown  Description: 1.  Monitor for areas of redness and/or skin breakdown  2.  Assess vascular access sites hourly  3.  Every 4-6 hours minimum:  Change oxygen saturation probe site  4.  Every 4-6 hours:  If on nasal continuous positive airway pressure, respiratory therapy assess nares and determine need for appliance change or resting period.  Outcome: Progressing     Problem: ABCDS Injury Assessment  Goal: Absence of physical injury  Outcome: Progressing     
  Problem: Safety - Adult  Goal: Free from fall injury  Outcome: Progressing     Problem: Skin/Tissue Integrity  Goal: Absence of new skin breakdown  Description: 1.  Monitor for areas of redness and/or skin breakdown  2.  Assess vascular access sites hourly  3.  Every 4-6 hours minimum:  Change oxygen saturation probe site  4.  Every 4-6 hours:  If on nasal continuous positive airway pressure, respiratory therapy assess nares and determine need for appliance change or resting period.  Outcome: Progressing     Problem: ABCDS Injury Assessment  Goal: Absence of physical injury  Outcome: Progressing     Problem: Pain  Goal: Verbalizes/displays adequate comfort level or baseline comfort level  Outcome: Progressing     
  Problem: Safety - Adult  Goal: Free from fall injury  Outcome: Progressing  Flowsheets (Taken 11/25/2024 1736)  Free From Fall Injury: Instruct family/caregiver on patient safety     Problem: ABCDS Injury Assessment  Goal: Absence of physical injury  Outcome: Progressing  Flowsheets (Taken 11/25/2024 1736)  Absence of Physical Injury: Implement safety measures based on patient assessment     
  Problem: Safety - Adult  Goal: Free from fall injury  Outcome: Progressing  Flowsheets (Taken 11/27/2024 0251)  Free From Fall Injury:   Instruct family/caregiver on patient safety   Based on caregiver fall risk screen, instruct family/caregiver to ask for assistance with transferring infant if caregiver noted to have fall risk factors     Problem: Skin/Tissue Integrity  Goal: Absence of new skin breakdown  Description: 1.  Monitor for areas of redness and/or skin breakdown  2.  Assess vascular access sites hourly  3.  Every 4-6 hours minimum:  Change oxygen saturation probe site  4.  Every 4-6 hours:  If on nasal continuous positive airway pressure, respiratory therapy assess nares and determine need for appliance change or resting period.  Outcome: Progressing     
  Problem: Safety - Adult  Goal: Free from fall injury  Outcome: Progressing  Flowsheets (Taken 11/27/2024 0251)  Free From Fall Injury:   Instruct family/caregiver on patient safety   Based on caregiver fall risk screen, instruct family/caregiver to ask for assistance with transferring infant if caregiver noted to have fall risk factors     Problem: Skin/Tissue Integrity  Goal: Absence of new skin breakdown  Description: 1.  Monitor for areas of redness and/or skin breakdown  2.  Assess vascular access sites hourly  3.  Every 4-6 hours minimum:  Change oxygen saturation probe site  4.  Every 4-6 hours:  If on nasal continuous positive airway pressure, respiratory therapy assess nares and determine need for appliance change or resting period.  Outcome: Progressing     
  Problem: Safety - Adult  Goal: Free from fall injury  Outcome: Progressing  Flowsheets (Taken 11/30/2024 1145 by Frances Lemus, RN)  Free From Fall Injury: Instruct family/caregiver on patient safety     Problem: Skin/Tissue Integrity  Goal: Absence of new skin breakdown  Description: 1.  Monitor for areas of redness and/or skin breakdown  2.  Assess vascular access sites hourly  3.  Every 4-6 hours minimum:  Change oxygen saturation probe site  4.  Every 4-6 hours:  If on nasal continuous positive airway pressure, respiratory therapy assess nares and determine need for appliance change or resting period.  Outcome: Progressing     Problem: ABCDS Injury Assessment  Goal: Absence of physical injury  Outcome: Progressing  Flowsheets (Taken 11/30/2024 1145 by Frances Lemus, RN)  Absence of Physical Injury: Implement safety measures based on patient assessment     Problem: Pain  Goal: Verbalizes/displays adequate comfort level or baseline comfort level  Outcome: Progressing  Flowsheets (Taken 12/2/2024 0050)  Verbalizes/displays adequate comfort level or baseline comfort level:   Encourage patient to monitor pain and request assistance   Assess pain using appropriate pain scale   Administer analgesics based on type and severity of pain and evaluate response     
Increase patients ADLs/functional status to baseline.    
Progress functional mobility   
SLP completed evaluation. Please refer to notes in EMR.      Thank you,   Cynthia Gaston M.A. CCC-SLP   Speech-Language Pathologist    
all other ROS negative except as per HPI

## 2024-12-02 NOTE — PROGRESS NOTES
PROGRESS NOTE    HPI: Zeinab De León is a(n)89 y.o. female admitted for work-up and treatment for Diverticulitis [K57.92]  Diverticulitis of colon [K57.32]  Elevated troponin [R79.89].     We are following for diverticulitis, dysphagia.    Subjective:     Reports continued RLQ pain but improved.  She reports normal bowel movements.  She reports improvement with dysphagia with small bites of food.      Objective:     I/O last 3 completed shifts:  In: 960 [P.O.:960]  Out: 875 [Urine:875]      BP (!) 161/89   Pulse 92   Temp 98.1 °F (36.7 °C) (Oral)   Resp 16   Ht 1.524 m (5')   Wt 59.9 kg (132 lb)   SpO2 91%   BMI 25.78 kg/m²     Physical Exam:  HEENT: anicteric sclera, oropharyngeal membranes pink and moist.  Cor: RRR  Lungs: non-labored, no respiratory distress  Abdomen: soft, non distended, + mild RLQ pain. No ascites.  No hepatomegaly or splenomegaly  Extremities: no edema  Neuro: alert and oriented x 3      Results:   Lab Results   Component Value Date    ALT 7 (L) 11/24/2024    AST 18 11/24/2024    ALKPHOS 114 11/24/2024    BILIDIR <0.2 10/16/2018    INR 2.71 (H) 12/02/2024     Lab Results   Component Value Date    WBC 5.1 12/02/2024    HGB 12.4 12/02/2024    HCT 36.9 12/02/2024    .7 (H) 12/02/2024     12/02/2024     BUN/Cr/glu/ALT/AST/amyl/lip:  10/1.1/--/--/--/--/-- (12/02 0516)  CT ABDOMEN PELVIS WO CONTRAST Additional Contrast? None    Result Date: 11/30/2024  Extensive colonic diverticulosis without definite acute diverticulitis.     MRI BRAIN WO CONTRAST    Result Date: 11/29/2024  Cerebral atrophy.  Moderate to severe chronic small vessel ischemic changes. No acute brain parenchymal abnormality.     CTA HEAD NECK W CONTRAST    Result Date: 11/27/2024  1. No large vessel occlusion, significant stenosis or cerebral aneurysm evident. 2. Penetrating ulcer measuring 1 cm in depth within the aortic arch.  This could represent a source 
                                     PROGRESS NOTE  S:89 yrs Patient  admitted on 11/24/2024 with Diverticulitis [K57.92]  Diverticulitis of colon [K57.32]  Elevated troponin [R79.89] .    Patient states swallowing and abdominal pain have improved    Current Hospital Schedued Meds   warfarin  1 mg Oral Once    potassium chloride  20 mEq Oral BID    magnesium oxide  400 mg Oral BID    pantoprazole  40 mg IntraVENous BID    sucralfate  1 g Oral 4 times per day    ondansetron  4 mg Oral q8h    melatonin  5 mg Oral Nightly    atorvastatin  20 mg Oral Daily    dilTIAZem  240 mg Oral Daily    sodium chloride flush  5-40 mL IntraVENous 2 times per day    warfarin placeholder: dosing by pharmacy   Other RX Placeholder     Current Hospital IV Meds   sodium chloride 10 mL/hr at 11/27/24 2030     Current Hospital PRN Meds  prochlorperazine, traMADol, magnesium sulfate, sodium chloride flush, sodium chloride, ondansetron **OR** ondansetron, polyethylene glycol, acetaminophen **OR** acetaminophen    Exam:   Vitals:    12/01/24 1509   BP:    Pulse: 63   Resp: 17   Temp:    SpO2: 95%     I/O last 3 completed shifts:  In: 1040 [P.O.:1040]  Out: 1200 [Urine:1200]   General appearance: alert, appears stated age, and cooperative  HEENT: PERRLA  Neck: no adenopathy, no carotid bruit, no JVD, supple, symmetrical, trachea midline, and thyroid not enlarged, symmetric, no tenderness/mass/nodules  Lungs: clear to auscultation bilaterally  Heart: regular rate and rhythm, S1, S2 normal, no murmur, click, rub or gallop  Abdomen: soft, non-tender; bowel sounds normal; no masses,  no organomegaly  Extremities: extremities normal, atraumatic, no cyanosis or edema     Labs:  CBC:   Recent Labs     11/29/24  0511 11/30/24  0537 12/01/24  0454   WBC 4.9 4.7 4.7   HGB 13.1 12.4 12.2   HCT 39.6 37.2 36.8   .9* 101.9* 100.6*    178 177     BMP:   Recent Labs     11/29/24  0511 11/30/24  0537 12/01/24  0454    138 138   K 3.5 3.6 
    V2.0    [facility] Progress Note      Name:  Zeinab De León /Age/Sex: 1931  (89 y.o. female)   MRN & CSN:  [id number] & [id number] Encounter Date/Time: 2024 10:09 AM EST   Location:  0334/0334-01 PCP: Fidel Black DO     Attending:Hi Ruvalcaba MD       Hospital Day: 6    Assessment and Recommendations   Zeinab De León is a 89 y.o. female with pmh of HTN, HLD, Afib, colon cancer s/p hemicolectomy who presents with Diverticulitis    Interval History: Patient reports RLQ abdominal pain that began overnight. She states it is relieved with a heating pad. She states that her nausea comes and goes, but she is able to tolerate a regular diet. Patient continues to feel weak this morning.    Plan:   Diverticulitis  - CT AP (24) shows subtle fat stranding is seen surrounding the sigmoid colon the pelvis,  suggesting acute diverticulitis.  No pericolonic abscess.  - Meropenem started in the ED  - Discontinued meropenem. IV cipro given for 4 days and IV flagyl given for 5 days  - Discontinued IV antibiotics on 24. Started a 6 day course of PO cipro and PO flagyl  - Continue Zofran PRN  - Blood cultures x2 NGTD  - Lactic acid 1.2>1.0  - WBC not elevated  - Will plan to repeat CT AP on  if patient's abdominal pain persists    Dysarthria   - CT head shows atrophy and small-vessel ischemic change. No definite acute findings noted   - CTA head shows  no large vessel occlusion, significant stenosis or cerebral aneurysm evident. Penetrating ulcer measuring 1 cm in depth within the aortic arch. This could represent a source of distal embolization.  - Echo pending  - Neurology consulted.    Penetrating aortic ulcer  - CT Surgery consulted. Given her multiple comorbidities and advanced age, would not recommend surgical intervention given its complexity and high risk.    Dysphagia, Globus sensation  - GI consulted  -Given the aortic valve ulceration with uncertain prognosis, active warfarin 
    V2.0    [facility] Progress Note      Name:  Zeinab De León /Age/Sex: 1931  (89 y.o. female)   MRN & CSN:  [id number] & [id number] Encounter Date/Time: 2024 10:09 AM EST   Location:  0334/0334-01 PCP: Fidel Black DO     Attending:Hi Ruvalcaba MD       Hospital Day: 8    Assessment and Recommendations   Zeinab De León is a 89 y.o. female with pmh of HTN, HLD, Afib, colon cancer s/p hemicolectomy who presents with Diverticulitis    Interval History:   Patient seen this morning, sitting comfortably in chair.  She reported having lower abdominal pain more in RLQ, 6/10 in intensity.  She denies having nausea this morning.  Her fatigue is persistent but she denies having any other acute complaints today.    Plan:   Diverticulitis  - CT AP (24) shows subtle fat stranding is seen surrounding the sigmoid colon the pelvis,  suggesting acute diverticulitis.  No pericolonic abscess.  - Meropenem started in the ED  - Discontinued meropenem. IV cipro given for 4 days and IV flagyl given for 5 days  - Discontinued IV antibiotics on 24  - Continue Zofran PRN  - CT AP > extensive evidence of diverticulosis without any acute diverticulitis  - Received p.o. Cipro and Flagyl for 6 days, discontinue antibiotics today    Dysarthria   - CT head shows atrophy and small-vessel ischemic change. No definite acute findings noted   - CTA head shows  no large vessel occlusion, significant stenosis or cerebral aneurysm evident. Penetrating ulcer measuring 1 cm in depth within the aortic arch. This could represent a source of distal embolization.  - Neurology consulted  - Brain MRI shows cerebral atrophy. Moderate to severe small vessel ischemic changes. No acute abnormality  - EEG  - Will consider alternative antibiotics. Discussed allergies with patient today, and she does report hives with amoxicillin and Bactrim    Penetrating aortic ulcer  - CT Surgery consulted. Given her multiple comorbidities and 
    V2.0    [facility] Progress Note      Name:  Zeinab De León /Age/Sex: 1931  (89 y.o. female)   MRN & CSN:  [id number] & [id number] Encounter Date/Time: 2024 10:09 AM EST   Location:  03340334-01 PCP: Fidel Black DO     Attending:Hi Ruvalcaba MD       Hospital Day: 4    Assessment and Recommendations   Zeinab De León is a 89 y.o. female with pmh of HTN, HLD, Afib, colon cancer s/p hemicolectomy who presents with Diverticulitis    Interval History: Patient reports minimal nausea, and has been tolerating regular diet. Patient has left sided facial droop that appears to be her baseline. She does have some weakness on left with hand . She reports tremors in her bilateral upper extremities and restlessness in her legs. She reports no new or worsening pain and no shortness of breath.    Plan:   Diverticulitis  - CT AP (24) shows subtle fat stranding is seen surrounding the sigmoid colon the pelvis,  suggesting acute diverticulitis.  No pericolonic abscess.  - Meropenem started in the ED  - Discontinued meropenem. Started IV cipro (Day 3) and IV flagyl (Day 4)  - Continue Zofran PRN  - Continuous IV NS 100ml/hr. Will plan to discontinue if patient tolerates diet  - Blood cultures x2 pending  - Lactic acid 1.2>1.0  - WBC not elevated    Dysarthria   - CT head shows atrophy and small-vessel ischemic change. No definite acute findings noted   - CTA head pending  - Echo pending     UTI  - Patient reports increased urinary frequency and blood tinged urine for the past couple of days  - UA significant for blood, leukocyte esterase, 4+ bacteria  - IV ciprofloxacin    Dysarthria     Elevated Troponin  - Troponin trended 17>15     Afib  - EKG (24) shows Afib with rate of 101, Qtc 238  - Will continue patient's home warfarin   - Warfarin dosing per pharmacy   - Telemetry monitoring    Hypomagnesemia  Hypokalemia  - Will continue to monitor and replace as needed     HTN  - Continue home 
    V2.0    [facility] Progress Note      Name:  Zeinab De León /Age/Sex: 1931  (89 y.o. female)   MRN & CSN:  [id number] & [id number] Encounter Date/Time: 2024 10:09 AM EST   Location:  0334/0334-01 PCP: Fidel Black DO     Attending:Dada Justin DO       Hospital Day: 5    Assessment and Recommendations   Zeinab De León is a 89 y.o. female with pmh of HTN, HLD, Afib, colon cancer s/p hemicolectomy who presents with Diverticulitis    Interval History: Patient reports no change in her weakness this morning. She is tolerating PO intake and has minimal nausea. She reports no new or worsening pain. She reports no shortness of breath.    Plan:   Diverticulitis  - CT AP (24) shows subtle fat stranding is seen surrounding the sigmoid colon the pelvis,  suggesting acute diverticulitis.  No pericolonic abscess.  - Meropenem started in the ED  - Discontinued meropenem. IV cipro given for 4 days and IV flagyl given for 5 days  - Discontinued IV antibiotics on 24. Started a 6 day course of PO cipro and PO flagyl  - Continue Zofran PRN  - Blood cultures x2 NGTD  - Lactic acid 1.2>1.0  - WBC not elevated    Dysarthria   - CT head shows atrophy and small-vessel ischemic change. No definite acute findings noted   - CTA head shows  no large vessel occlusion, significant stenosis or cerebral aneurysm evident. Penetrating ulcer measuring 1 cm in depth within the aortic arch. This could represent a source of distal embolization.  - Echo pending  - Neurology consulted.    Penetrating aortic ulcer  - CT Surgery consulted. Given her multiple comorbidities and advanced age, would not recommend surgical intervention given its complexity and high risk.    Dysphagia, Globus sensation  - GI consulted  -Given the aortic valve ulceration with uncertain prognosis, active warfarin treatment and advanced age with comorbidities feel that the risks of EGD outweigh the benefits at this time.  -Will plan to treat chronic 
  4 Eyes Skin Assessment and Patient belongings     The patient is being assess for  Shift Handoff    I agree that 2 Nurses have performed a thorough Head to Toe Skin Assessment on the patient. ALL assessment sites listed below have been assessed.       Areas assessed by both nurses: HEMANT Daniels and Fidel MARCOS  [x]   Head, Face, and Ears   [x]   Shoulders, Back, and Chest  [x]   Arms, Elbows, and Hands   [x]   Coccyx, Sacrum, and IschIum  [x]   Legs, Feet, and Heels        Does the Patient have Skin Breakdown?  No         Yogesh Prevention initiated:  Yes   Wound Care Orders initiated:  NA      [facility] nurse consulted for Pressure Injury (Stage 3,4, Unstageable, DTI, NWPT, and Complex wounds), New and Established Ostomies:  NA      I agree that 2 Nurses have reviewed patient belongings with the patient/family and documented in the flowsheet upon admission or transfer to the unit.     Belongings  Dental Appliances: None  Vision - Corrective Lenses: None  Hearing Aid: Bilateral hearing aids  Clothing: Pajamas  Jewelry: None  Body Piercings Removed: N/A  Electronic Devices: Cell Phone  Weapons (Notify Protective Services/Security): None  Other Valuables: Purse  Home Medications: None  Valuables Given To: Patient  Provide Name(s) of Who Valuable(s) Were Given To: Zeinab       Nurse 1 eSignature: Electronically signed by Frances Lemus RN on 11/30/24 at 11:46 AM EST    **SHARE this note so that the co-signing nurse is able to place an eSignature**    Nurse 2 eSignature: Electronically signed by Fidel Ball RN on 11/30/24 at 4:01 PM EST   
  Pharmacy Note  Warfarin Consult  Dx: AFib  Goal INR range 2-3   Home Warfarin dose: 2 mg  every Mon, Wed, Fri; 4 mg  all other days   Potential DDI with Cipro (started 11/25): may elevate INR     Date                 INR                  Warfarin  11/24              1.71                     4 mg   11/25              ------                     2 mg  11/26              2.83                     Hold  11/27              3.10                     Hold  11/28              2.83                     1 mg     Recommend Warfarin 1mg tonight.  Daily INR ordered.  Rx will continue to manage therapy per consult order.  Hayes West PharmD 11/28/2024  6:56 AM  
  Pharmacy Note  Warfarin Consult  Dx: AFib  Goal INR range 2-3   Home Warfarin dose: 2 mg  every Mon, Wed, Fri; 4 mg  all other days   Potential DDI with Cipro (started 11/25): may elevate INR     Date                 INR                  Warfarin  11/24              1.71                     4 mg   11/25              ------                     2 mg  11/26              2.83                     Hold  11/27              3.10                     Hold  11/28              2.83                     1 mg  11/29              2.49                     2 mg     Recommend Warfarin 2mg tonight.  Daily INR ordered.  Rx will continue to manage therapy per consult order.  Hayes West PharmD 11/29/2024  6:30 AM  
  Pharmacy Note  Warfarin Consult  Dx: AFib  Goal INR range 2-3   Home Warfarin dose: 2 mg  every Mon, Wed, Fri; 4 mg  all other days   Potential DDI with Cipro (started 11/25): may elevate INR     Date                 INR                  Warfarin  11/24              1.71                     4 mg   11/25              ------                     2 mg  11/26              2.83                     Hold  11/27              3.10                     Hold  11/28              2.83                     1 mg  11/29              2.49                     2 mg  11/30              2.69                     2 mg    Recommend Warfarin 2mg tonight. Daily INR ordered. Rx will continue to manage therapy per consult order.   Hayes West PharmD 11/30/2024  6:50 AM  
  Pharmacy Note  Warfarin Consult  Dx: AFib  Goal INR range 2-3   Home Warfarin dose: 2 mg  every Mon, Wed, Fri; 4 mg  all other days   Potential DDI with Cipro (started 11/25): may elevate INR     Date                 INR                  Warfarin  11/24              1.71                     4 mg   11/25              ------                     2 mg  11/26              2.83                     Hold  11/27              3.10                     Hold  11/28              2.83                     1 mg  11/29              2.49                     2 mg  11/30              2.69                     2 mg  12/1                2.89                    1 mg     Recommend Warfarin 1mg tonight. Daily INR ordered. Rx will continue to manage therapy per consult order.   Hayes West PharmD 12/1/2024  6:34 AM  
  Pharmacy Note  Warfarin Consult  Dx: AFib  Goal INR range 2-3   Home Warfarin dose: 2 mg  every Mon, Wed, Fri; 4 mg  all other days   Potential DDI with Cipro (started 11/25): may elevate INR    Date  INR  Warfarin  11/24              1.71                     4 mg   11/25              ------                     2 mg    Recommend Warfarin 2 mg tonight x1.  Daily INR ordered.  Rx will continue to manage therapy per consult order.  Bob Carrasco, PharmD  11/25/2024 at 8:34 AM    
  Pharmacy Note  Warfarin Consult  Dx: AFib  Goal INR range 2-3   Home Warfarin dose: 2 mg  every Mon, Wed, Fri; 4 mg  all other days   Potential DDI with Cipro (started 11/25): may elevate INR    Date  INR  Warfarin  11/24              1.71                     4 mg   11/25              ------                     2 mg  11/26              2.83                     Hold    Recommend to hold Warfarin tonight x1 d/t moderate increase in INR and Cipro DDI.  Daily INR ordered.  Rx will continue to manage therapy per consult order.  Bob Carrasco, PharmD  11/26/2024 at 8:16 AM                  
  Pharmacy Note  Warfarin Consult  Dx: AFib  Goal INR range 2-3   Home Warfarin dose: 2 mg  every Mon, Wed, Fri; 4 mg  all other days   Potential DDI with Cipro (started 11/25): may elevate INR    Date  INR  Warfarin  11/24              1.71                     4 mg   11/25              ------                     2 mg  11/26              2.83                     Hold  11/27              3.10                     Hold    Recommend to hold Warfarin tonight x1 d/t moderate increase in INR and Cipro DDI.  Daily INR ordered.  Rx will continue to manage therapy per consult order.  Bronson Arias, Pharm D.11/27/2024 7:02 AM                      
  Speech Language Pathology  Attempt Note     Name: Zeinab De León  : 1931  Medical Diagnosis: Diverticulitis [K57.92]  Diverticulitis of colon [K57.32]  Elevated troponin [R79.89]      SLP attempted to see pt for bedside swallow evaluation (BSE). Order acknowledged and chart reviewed for completion of eval. Evaluation unable to be completed due to pt NPO for CT scheduled for approx 1200 per RN. SLP to re-attempt as pt's condition and schedule allows. RN aware. No charges.    Thank you,    Cynthia Gaston M.A. CCC-SLP   Speech-Language Pathologist     
  Speech Language Pathology  Clinical Bedside Swallow Assessment  Facility/Department: [facility] C3 TELE/MED SURG/ONC        Recommendations:  Diet recommendation: IDDSI 7 Regular Solids; IDDSI 0 Thin Liquids; Meds whole with thin liquids  Instrumentation: Not clinically indicated at this time. Will continue to monitor  Risk management: upright for all intake, stay upright for at least 30 mins after intake, small bites/sips, distant supervision with intake, oral care 2-3x/day to reduce adverse affects in the event of aspiration, increase physical mobility as able, alternate bites/sips, slow rate of intake, general GERD precautions, general aspiration precautions, and hold PO and contact SLP if s/s of aspiration or worsening respiratory status develop.  *Recommend consideration for GI consult d/t pt c/o weight loss, globus sensation, and premature satiety. PS sent to MD.   *Pt c/o ongoing dysarthric speech- son endorsed. Recommend speech eval- PS sent to MD.     NAME:Zeinab De León  : 1931 (89 y.o.)   MRN: [id number]  ROOM: [address] [place] [address]  ADMISSION DATE: 2024  PATIENT DIAGNOSIS(ES): Diverticulitis [K57.92]  Diverticulitis of colon [K57.32]  Elevated troponin [R79.89]  Chief Complaint   Patient presents with    Fatigue     Pt c/o weakness and nausea for 'a few days'.  Reports difficulty emptying bladder.  [id number].  #18 L AC started by EMS 4mg zofran given and 0.9NS started.     Patient Active Problem List    Diagnosis Date Noted    Diverticulitis 2024    Secondary hypercoagulable state (HCC) 2024    Long term current use of anticoagulant therapy 2016    Permanent atrial fibrillation (HCC)     Essential hypertension     Urinary retention 2016    Hypernatremia 2016    Hypokalemia 2016    Acute blood loss anemia 2016    Anemia 2016    Hyperlipemia 2015     Past Medical History:   Diagnosis Date    Anemia 2016    Atrial fibrillation (HCC) 2006    RESOLVED, ONE 
4 Eyes Skin Assessment and Patient belongings     The patient is being assess for  Admission    I agree that 2 Nurses have performed a thorough Head to Toe Skin Assessment on the patient. ALL assessment sites listed below have been assessed.       Areas assessed by both nurses: HEMANT Figueroa/Trey MARCOS  [x]   Head, Face, and Ears   [x]   Shoulders, Back, and Chest  [x]   Arms, Elbows, and Hands   [x]   Coccyx, Sacrum, and IschIum  [x]   Legs, Feet, and Heels        Does the Patient have Skin Breakdown?   Redmess to coccyx, blanchable. Vascular discoloration to bilateral hands and bottom of feet, Dry flaky skin to BLE.           Yogesh Prevention initiated:  Yes   Wound Care Orders initiated:  NA      [facility] nurse consulted for Pressure Injury (Stage 3,4, Unstageable, DTI, NWPT, and Complex wounds), New and Established Ostomies:  NA      I agree that 2 Nurses have reviewed patient belongings with the patient/family and documented in the flowsheet upon admission or transfer to the unit.     Belongings  Dental Appliances: None  Vision - Corrective Lenses: None  Hearing Aid: Bilateral hearing aids  Clothing: Pajamas  Jewelry: None  Body Piercings Removed: N/A  Electronic Devices: Cell Phone  Weapons (Notify Protective Services/Security): None  Other Valuables: Purse  Home Medications: None  Valuables Given To: Patient  Provide Name(s) of Who Valuable(s) Were Given To: Zeinab       Nurse 1 eSignature: Electronically signed by EDSON RIZZO RN on 11/24/24 at 4:57 PM EST    **SHARE this note so that the co-signing nurse is able to place an eSignature**    Nurse 2 eSignature: {Esignature:[id number]}   
4 Eyes Skin Assessment and Patient belongings     The patient is being assess for  Shift Handoff    I agree that 2 Nurses have performed a thorough Head to Toe Skin Assessment on the patient. ALL assessment sites listed below have been assessed.       Areas assessed by both nurses: Edson RN/ Tiffany RN   [x]   Head, Face, and Ears   [x]   Shoulders, Back, and Chest  [x]   Arms, Elbows, and Hands   [x]   Coccyx, Sacrum, and IschIum  [x]   Legs, Feet, and Heels        Does the Patient have Skin Breakdown?  No         Yogesh Prevention initiated:  Yes   Wound Care Orders initiated:  NA      [facility] nurse consulted for Pressure Injury (Stage 3,4, Unstageable, DTI, NWPT, and Complex wounds), New and Established Ostomies:  NA      I agree that 2 Nurses have reviewed patient belongings with the patient/family and documented in the flowsheet upon admission or transfer to the unit.     Belongings  Dental Appliances: None  Vision - Corrective Lenses: None  Hearing Aid: Bilateral hearing aids  Clothing: Pajamas  Jewelry: None  Body Piercings Removed: N/A  Electronic Devices: Cell Phone  Weapons (Notify Protective Services/Security): None  Other Valuables: Purse  Home Medications: None  Valuables Given To: Patient  Provide Name(s) of Who Valuable(s) Were Given To: Zeinab Brock 1 eSignature: Electronically signed by EDSON RIZZO RN on 11/25/24 at 6:20 PM EST    **SHARE this note so that the co-signing nurse is able to place an eSignature**    Nurse 2 eSignature: {Esignature:[id number]}   
Assessment completed and documented. A&O x4. Remains on ATB for diverticulitis. No c/o pain this shift. Tolerating clears.  Bed in lowest position, call light in reach, bed alarm in place, non-skid socks on. Denies any needs at this time.  Electronically signed by EDSON RIZZO RN on 11/25/24 at 6:35 PM EST    
Occupational Therapy  Attempted OT tx. Pt is receiving Echo. Cont OT.   Michela Hairston OTR/L   
Occupational Therapy  Facility/Department: [facility] C3 TELE/MED SURG/ONC  Daily Treatment Note  NAME: Zeinab De León  : 1931  MRN: [id number]    Date of Service: 2024    Discharge Recommendations:  Subacute/Skilled Nursing Facility        Barriers to Home Discharge:   [] Steps to access home entry or bed/bath   [x] Unable to transfer, ambulate, or propel wheelchair household distances without assist   [] Limited available assist at home upon discharge    [x] Patient or family requests d/c to post-acute facility    [] Poor cognition/safety awareness for d/c to home alone    [] Unable to maintain ordered weight bearing status    [] Patient with salient signs of long-standing immobility   [x] Decreased independence with ADLs   [x] Increased risk for falls   [x] Other: Impaired balance during ADLs     Patient Diagnosis(es): The primary encounter diagnosis was Diverticulitis of colon. Diagnoses of Elevated troponin and Heart murmur were also pertinent to this visit.     Assessment   Assessment: Pt demos overall good tolerance of OT treatment, pleasant and cooperative throughout. Pt initially CGA for functional transfers and mobility with RW, pt quickly fatigues and requires min A to facilitate safe transfers, mobility and standing balance for completion of sink level grooming tasks. Pt tolerating UE therex with rest breaks. Pt functioning below her baseline, recommend SNF at d/c.   Activity Tolerance: Patient tolerated treatment well  Discharge Recommendations: Subacute/Skilled Nursing Facility     Plan  Occupational Therapy Plan  Times Per Week: 3-5x    Restrictions  Restrictions/Precautions  Restrictions/Precautions: Fall Risk  Position Activity Restriction  Other position/activity restrictions: Up with assist, brenda    Subjective  Subjective  Subjective: Pt in bed at entry, agreeable to OT treatment.    Pain: Pt denies pain.    Orientation  Overall Orientation Status: Within Functional 
Occupational Therapy  Facility/Department: [facility] C3 TELE/MED SURG/ONC  Occupational Therapy Initial Assessment and Treatment Note     Name: Zeinab De León  : 1931  MRN: [id number]  Date of Service: 2024    Discharge Recommendations:  Subacute/Skilled Nursing Facility   Therapy discharge recommendations are subject to collaboration from the patient’s interdisciplinary healthcare team, including MD and case management recommendations.  Barriers to Home Discharge:   [] Steps to access home entry or bed/bath:   [x] Unable to transfer, ambulate, or propel wheelchair household distances without assist   [] Limited available assist at home upon discharge    [] Patient or family requests d/c to post-acute facility    [] Poor cognition/safety awareness for d/c to home alone    [] Unable to maintain ordered weight bearing status    [] Patient with salient signs of long-standing immobility   [x] Decreased independence with ADLs   [x] Increased risk for falls   [x] Other: Impaired balance during ADLs     If pt is unable to be seen after this session, please let this note serve as discharge summary.  Please see case management note for discharge disposition.  Thank you.     Patient Diagnosis(es): The primary encounter diagnosis was Diverticulitis of colon. A diagnosis of Elevated troponin was also pertinent to this visit.  Past Medical History:  has a past medical history of Anemia, Atrial fibrillation (HCC), Cancer (HCC), Hyperlipidemia, Hypertension, and Skin cancer of forehead.  Past Surgical History:  has a past surgical history that includes Appendectomy; Hysterectomy; Colonoscopy (16); Upper gastrointestinal endoscopy (16); fracture surgery; and Colon surgery (Right, 16).    Treatment Diagnosis: deconditioning    Assessment  Performance deficits / Impairments: Decreased functional mobility ;Decreased ADL status;Decreased endurance;Decreased balance;Decreased strength;Decreased 
Occupational Therapy  Facility/Department: [facility] C3 TELE/MED SURG/ONC  Daily Treatment Note  NAME: Zeinab De León  : 1931  MRN: [id number]    Date of Service: 2024    Discharge Recommendations:  Subacute/Skilled Nursing Facility   Barriers to Home Discharge:   [] Steps to access home entry or bed/bath:   [x] Unable to transfer, ambulate, or propel wheelchair household distances without assist   [] Limited available assist at home upon discharge    [x] Patient or family requests d/c to post-acute facility    [] Poor cognition/safety awareness for d/c to home alone    [] Unable to maintain ordered weight bearing status    [] Patient with salient signs of long-standing immobility   [x] Decreased independence with ADLs   [x] Increased risk for falls   [x] Other: Impaired balance during ADLs      If pt is unable to be seen after this session, please let this note serve as discharge summary.  Please see case management note for discharge disposition.  Thank you.  Patient Diagnosis(es): The primary encounter diagnosis was Diverticulitis of colon. Diagnoses of Elevated troponin and Heart murmur were also pertinent to this visit.     Assessment   Assessment: Pt seen for OT tx for ADLs and mobility. She required mod x1 for supine<>sit and min A x1 for transfers with RW. Pt demo's decreased endurance during mobility.  She requires max to total assist for LE ADLs d/t impaired balance and endurance. Recommend SNF for skilled OT. Cont OT in acute care.  Activity Tolerance: Patient tolerated treatment well  Discharge Recommendations: Subacute/Skilled Nursing Facility     Plan  Occupational Therapy Plan  Times Per Week: 3-5x  Current Treatment Recommendations: Balance training;Strengthening;Functional mobility training;Endurance training;Safety education & training;Self-Care / ADL    Restrictions  Restrictions/Precautions  Restrictions/Precautions: Fall Risk  Position Activity Restriction  Other position/activity 
Pt A&O, VSS, assessment complete. Pt denies any pain/nausea at this time. Pt is sleeping. Pt has purewick, already changed once this shift. Bed at lowest position, call light and bedside table within reach.  
Pt A&O, VSS, assessment complete. Pt has purewick on at night. Walker x1 SBA when ambulating. Pt has denied any nausea or pain during shift. Bed at lowest position, call light and bedside table within reach.                
Pt a/o. VSS. Shift assessment updated and documented. Nil concerns overnight , nil nausea or vomiting reported by patient . Required x1 pain dose during the night. Up this morning assisted to ambulate to the bathroom. Nil bowel movement passed some gas and voiding with no concerns, Due medicines given and tolerated well by patient. Hygiene  care and patient left comfortable   
Pt assessment completed and charted. VSS. Pt a/ox4. Denies pain. Pt states she did have nausea after eating dinner today, nausea has resolved.  Bed in lowest position and wheels locked. Call light within reach. Bedside table within reach. Non-skid socks in place. Pt denies any other needs at this time.  Pt calls out appropriately.  
Pt at 30% of dinner, she stated she was afraid the food would get \"stuck\". No nausea, pt stated she feels stronger, Denies pain, no tremors of hands noted, SR 2/4, call light w/in reach, Bedcbeck on.  
RN attempted 3X to contact [facility] at [phone] w/o success. Contact number continues to ring without an answer.    Pt d/c'd to [facility].  Removed peripheral IV and stopped bleeding.  Catheter intact. Pt tolerated well. No redness noted at site.  Notified [facility] and removed tele box. Reviewed d/c instructions, home meds, and  f/u information utilizing teach-back method.  No new scripts given to patient. Patient verbalized understanding. Report given to EMS. Belongings at bedside during transfer. No questions or concerns at this time. Electronically signed by Lala Vogt RN on 12/2/24 at 2:17 PM EST      
RN attempted to make son Werner aware of room changes. LVMOM with call back number. Electronically signed by Lala Vogt RN on 12/2/24 at 9:22 AM EST     
Shift assessment completed.  Pt A&O, VSS. C/O 6/10 intermittent discomfort in RLQ, heat applied for comfort and PRN pain medication given per order. Denies any needs at this time.  Bed locked and in lowest position, side rails up 2/4.  Call light within reach.    
Shift assessment completed. Patient is A&O x4.  VSS.  Acute abdominal Pain 5/10.  IV site patent/ flushed, saline locked. Patient on RA.  Patient admits to passing gas. Patient has chronic nausea currently. Managed with Zofran. Patient ambulates with walker, X-1-SBA to bathroom. Patient has purewick in place.  Medication given per MAR.     Safety Measures in place:   Denies any needs at this time.   Bed/ Chair alarm on for safety.   Bed locked and in lowest position.    Call light within reach.   Gripper socks applied.   Patient in stable condition when RN leaving room.   Electronically signed by Frances Lemus RN on 11/30/2024 at 11:48 AM    
Shift assessment completed. Patient is A&O x4.  VSS.  Denies Pain, complains of nausea. IV site patent/ flushed, saline locked. Patient on RA.  Patient's last BM- 11/28/24. Patient admits to passing gas. Patient ambulates by with walker, X-1-SBA to bathroom.  Medication given per MAR.     Safety Measures in place:   Denies any needs at this time.   Bed/ Chair alarm on for safety.   Bed locked and in lowest position.    Call light within reach.   Gripper socks applied.   Patient in stable condition when RN leaving room.   Electronically signed by Frances Lemus RN on 11/28/2024 at 7:52 PM    
Shift assessment completed. Patient is A&O x4.  VSS.  IV site patent/ flushed, saline locked. Patient on RA.  Patient admits to passing gas. Patient had BM today. Patient has chronic nausea currently. Managed with Zofran. Patient ambulates with walker, X-1-SBA to bathroom. Patient has purewick in place while in bed. Brief while in chair. Calls when appropriate.  Medication given per MAR.      Safety Measures in place:   Denies any needs at this time.   Bed/ Chair alarm on for safety.   Bed locked and in lowest position.    Call light within reach.   Gripper socks applied.   Patient in stable condition when RN leaving room.   Electronically signed by Frances Lemus RN on 12/1/2024 at 10:37 AM    
Vitals:    11/26/24 1145   BP: (!) 144/81   Pulse: (!) 102   Resp: 16   Temp: 97.5 °F (36.4 °C)   SpO2: 94%     Patient doing well. She states, \"I feel so much better.\" Patient up to chair. IV antibiotics running. Room air. Patient eating lunch/regular diet. Will monitor.   
(11/24/24), 132 % IBW. Weight Source: Stated  Current BMI (kg/m2): 25.8  Usual Body Weight: 61.2 kg (135 lb)     % Weight Change (Calculated): -2.2  Weight Adjustment For: No Adjustment                 BMI Categories: Overweight (BMI 25.0-29.9)    Estimated Daily Nutrient Needs:  Energy Requirements Based On: Kcal/kg  Weight Used for Energy Requirements: Current  Energy (kcal/day): 1500 - 1800 (25-30kcal/kg)  Weight Used for Protein Requirements: Ideal  Protein (g/day):    Method Used for Fluid Requirements: 1 ml/kcal  Fluid (ml/day): 1500 - 1800    Nutrition Diagnosis:   Moderate malnutrition, in context of chronic illness related to catabolic illness as evidenced by muscle loss, loss of subcutaneous fat, poor intake prior to admission    Nutrition Interventions:   Food and/or Nutrient Delivery: Continue Current Diet, Continue Oral Nutrition Supplement  Nutrition Education/Counseling: No recommendation at this time  Coordination of Nutrition Care: Continue to monitor while inpatient       Goals:  Goals: Meet at least 75% of estimated needs, by next RD assessment          Nutrition Monitoring and Evaluation:   Behavioral-Environmental Outcomes: None Identified  Food/Nutrient Intake Outcomes: Food and Nutrient Intake, Supplement Intake  Physical Signs/Symptoms Outcomes: Biochemical Data, Nausea or Vomiting, Nutrition Focused Physical Findings, Weight, Meal Time Behavior    Discharge Planning:    Continue current diet     Brooklyn Garnica RD  Contact: [phone]    
05:16 AM    LABGLOM 42 11/01/2023 12:15 PM    GLUCOSE 106 12/02/2024 05:16 AM    PHOS 2.4 06/11/2016 04:52 PM    MG 1.46 12/01/2024 04:54 AM    CALCIUM 8.9 12/02/2024 05:16 AM     Lab Results   Component Value Date/Time    WBC 5.1 12/02/2024 05:16 AM    RBC 3.67 12/02/2024 05:16 AM    HGB 12.4 12/02/2024 05:16 AM    HCT 36.9 12/02/2024 05:16 AM    .7 12/02/2024 05:16 AM    RDW 14.1 12/02/2024 05:16 AM     12/02/2024 05:16 AM     Lab Results   Component Value Date    INR 2.71 (H) 12/02/2024    PROTIME 28.7 (H) 12/02/2024     I reviewed blood testing and other test results and discussed results with the patient      Impression:  LUE hemiparesis, dysarthria-- Onset 11/27. L hemiparesis has resolved although pt does have persistent mild dysarthria. No evidence of CVA on MRI  Atrial fibrillation  Hyperlipidemia  Hypertension         Recommendation  Awaiting EEG  Avoid ciprofloxacin/metronidazole       Electronically signed by CLAIRE Cruz CNP on 12/2/24 at 2:19 PM EST       This dictation was generated by voice recognition computer software. Although all attempts are made to edit the dictation for accuracy, there may be errors in the transcription that are not intended.      ===    I saw and evaluated the patient. I agree with the findings and plan of care as documented in the NP’s note. Additionally, I have included further information about my assessment and plan below:     I reviewed the lab results. Scr stable. Fluctuating dysarthria most likely due to ciprofloxacin and/or metronidazole neurotoxicity. Rule out seizure. EEG pending. Neurology will continue to follow.    Electronically signed by Jemal Schmidt MD on 12/2/2024 at 8:59 PM    
Home  Expected Disposition: Home  Estimated Date of Discharge: 2 days  Patient requires continued admission due to diverticulitis antibiotics   Surrogate Decision Maker/ POA       Personally reviewed Lab Studies and Imaging       Subjective:     Chief Complaint: Nausea, Fatigue     Zeinab De León is a 89 y.o. female with a pmh of HTN, HLD, Afib, colon cancer s/p right hemicolectomy who presents with Diverticulitis. History provided by patient and son. Patient lives at home with her son. She states that she has felt fatigues and nauseous for the past 2-3 days. She states she has not been eating or drinking much secondary to nausea. She reports minimal abdominal pain. She reports urinary frequency and blood-tinged urine for the past 2-3 days. She reports no fevers, no chills, no vomiting, no diarrhea, no black or bloody stools. She states she has not been admitted to the hospital in several years. She has not recently been on antibiotics.       Review of Systems:      Pertinent positives and negatives discussed in HPI    Objective:     Intake/Output Summary (Last 24 hours) at 11/25/2024 1009  Last data filed at 11/25/2024 0838  Gross per 24 hour   Intake 974.77 ml   Output 190 ml   Net 784.77 ml      Vitals:   Vitals:    11/24/24 1936 11/24/24 2343 11/25/24 0400 11/25/24 0745   BP: (!) 143/82 128/75 128/76 128/76   Pulse: 73 65 73 79   Resp: 16 16 16 15   Temp: 98.1 °F (36.7 °C) 98.2 °F (36.8 °C) 98.2 °F (36.8 °C) 98.2 °F (36.8 °C)   TempSrc: Oral Oral Oral Oral   SpO2: 90% 91% 90% 90%   Weight:       Height:             Physical Exam:      General appearance: No apparent distress, appears stated age and cooperative.  HEENT: Pupils equal, round, and reactive to light. Conjunctivae/corneas clear.  Neck: Supple, with full range of motion. No jugular venous distention. Trachea midline.  Respiratory:  Normal respiratory effort. Clear to auscultation, bilaterally without Rales/Wheezes/Rhonchi.  Cardiovascular: Regular 
    Extensive colonic diverticulosis without definite acute diverticulitis.     Echo (TTE) complete (PRN contrast/bubble/strain/3D)    Result Date: 11/29/2024    Left Ventricle: Low normal left ventricular systolic function with a visually estimated EF of 50 - 55%. Left ventricle size is normal. Mildly increased wall thickness. Normal wall motion.   Right Ventricle: Right ventricle is severely dilated. Moderately reduced systolic function.   Aortic Valve: Moderate stenosis of the aortic valve.  Low-flow low gradient severe aortic stenosis is possible.   Mitral Valve: Mild regurgitation.   Tricuspid Valve: Mild regurgitation. Normal RVSP. The estimated RVSP is 35 mmHg.   Left Atrium: Left atrium is severely dilated.   Right Atrium: Right atrium is severely dilated.   Aorta: Dilated aortic root. Ao root diameter is 3.8 cm. Dilated ascending aorta. Ao ascending diameter is 4.1 cm.     MRI BRAIN WO CONTRAST    Result Date: 11/29/2024  EXAMINATION: MRI OF THE BRAIN WITHOUT CONTRAST  11/29/2024 3:27 pm TECHNIQUE: Multiplanar multisequence MRI of the brain was performed without the administration of intravenous contrast. COMPARISON: None. HISTORY: ORDERING SYSTEM PROVIDED HISTORY: L hemiparesis, dysarthria TECHNOLOGIST PROVIDED HISTORY: Reason for exam:->L hemiparesis, dysarthria Initial evaluation. FINDINGS: INTRACRANIAL STRUCTURES/VENTRICLES: There is no acute infarct.  The ventricles and cisternal spaces are prominent consistent with cerebral atrophy. There are numerous punctate and confluent areas of high-signal in the periventricular white matter and centrum semiovale  that are likely related to chronic small vessel ischemic disease.  There is no midline shift or mass effect. There are no areas of restricted diffusion. ORBITS: The visualized portion of the orbits demonstrate no acute abnormality. SINUSES: There is mild mucoperiosteal thickening of the ethmoid air cells. The remainder of the sinuses are clear.  There 
  Social/Functional History  Social/Functional History  Lives With: Son (available 24/7)  Type of Home: House  Home Layout: One level  Home Access: Stairs to enter without rails  Entrance Stairs - Number of Steps: 1  Bathroom Shower/Tub: Tub/Shower unit  Bathroom Toilet: Handicap height  Bathroom Equipment: Shower chair, Toilet raiser  Home Equipment: Cane, Walker - Standard, Wheelchair - Manual, Lift chair (Pt sleeps in lift chair)  ADL Assistance: Independent  Homemaking Assistance: Needs assistance (Son helps with housework. Pt performs light chores also.)  Ambulation Assistance: Independent (Does not use device in home. Uses cane in community.)  Transfer Assistance: Independent  Active : No  Patient's  Info: Son drives  Leisure & Hobbies: crosswork puzzles and word finding  Vision/Hearing       Cognition   Orientation  Overall Orientation Status: Within Functional Limits  Orientation Level: Oriented to person;Oriented to time;Oriented to place;Disoriented to situation  Cognition  Overall Cognitive Status: WFL    Objective  Temp: 97.5 °F (36.4 °C)  Pulse: (!) 102  Heart Rate Source: Monitor  Respirations: 16  SpO2: 94 %  O2 Device: None (Room air)  BP: (!) 144/81  MAP (Calculated): 102  BP Location: Left upper arm  BP Method: Automatic  Patient Position: Sitting        Gross Assessment  AROM: Generally decreased, functional  Strength: Grossly decreased, non-functional  Coordination: Generally decreased, functional  Tone: Normal  Sensation: Intact       Strength RLE  Comment: 3/5  Strength LLE  Comment: 3/5        Bed Mobility Training  Bed Mobility Training: No  Balance  Sitting: Intact  Standing: Impaired  Standing - Static: Constant support;Poor  Standing - Dynamic: Constant support;Poor  Transfer Training  Transfer Training: Yes  Overall Level of Assistance: Moderate assistance  Interventions: Verbal cues;Safety awareness training;Manual cues  Sit to Stand: Minimum assistance;Assist X1;Additional 
at risk for falls;Nurse notified;Gait belt;Call light within reach;All fall risk precautions in place;Left in bed;Bed alarm in place  Restraints  Restraints Initially in Place: No      Goals  Short Term Goals  Time Frame for Short Term Goals: 12/1  Short Term Goal 1: Pt will complete bed mobility with Mod I  Short Term Goal 2: Pt will ambulate x 30' with RW with CGA  Short Term Goal 3: Pt will complete B LE exercises to improve functional mobility by 11/29  Patient Goals   Patient Goals : to get stronger    Education  Patient Education  Education Given To: Patient  Education Provided: Role of Therapy;Plan of Care;Home Exercise Program;Equipment;Transfer Training  Education Provided Comments: Disease Specific Education: Pt educated on importance of OOB mobility, prevention of complications of bedrest, and general safety during hospitalization.  Education Method: Verbal  Barriers to Learning: None  Education Outcome: Verbalized understanding;Continued education needed    AM-PAC - Mobility    AM-PAC Basic Mobility - Inpatient   How much help is needed turning from your back to your side while in a flat bed without using bedrails?: A Little  How much help is needed moving from lying on your back to sitting on the side of a flat bed without using bedrails?: A Lot  How much help is needed moving to and from a bed to a chair?: A Little  How much help is needed standing up from a chair using your arms?: A Little  How much help is needed walking in hospital room?: A Little  How much help is needed climbing 3-5 steps with a railing?: A Lot  AM-PAC Inpatient Mobility Raw Score : 16  AM-PAC Inpatient T-Scale Score : 40.78  Mobility Inpatient CMS 0-100% Score: 54.16  Mobility Inpatient CMS G-Code Modifier : CK         Therapy Time   Individual Concurrent Group Co-treatment   Time In 1440         Time Out 1503         Minutes 23         Timed Code Treatment Minutes: 23 Minutes       Sahara Castellano, PT           
[address]      [place] [address]     Phone: [phone]     Fax: [phone]         
11/28/24  0523 11/29/24  0511 11/30/24  0537    140 138   K 3.3* 3.5 3.6    106 104   CO2 23 24 24   BUN 8 9 9   CREATININE 0.9 0.9 0.9   GLUCOSE 121* 111* 108*     Hepatic:   No results for input(s): \"AST\", \"ALT\", \"BILITOT\", \"ALKPHOS\" in the last 72 hours.    Invalid input(s): \"ALB\"    Lipids:   Lab Results   Component Value Date/Time    CHOL 129 11/29/2024 05:11 AM    HDL 48 11/29/2024 05:11 AM    TRIG 71 11/29/2024 05:11 AM     Hemoglobin A1C:   Lab Results   Component Value Date/Time    LABA1C 6.5 11/29/2024 05:11 AM     TSH:   Lab Results   Component Value Date/Time    TSH 2.81 06/18/2024 09:18 AM     Troponin: No results found for: \"TROPONINT\"  Lactic Acid: No results for input(s): \"LACTA\" in the last 72 hours.  BNP: No results for input(s): \"PROBNP\" in the last 72 hours.  UA:  Lab Results   Component Value Date/Time    NITRU Negative 11/24/2024 11:41 AM    COLORU Yellow 11/24/2024 11:41 AM    PHUR 7.0 11/24/2024 11:41 AM    PHUR 5.5 10/27/2016 12:00 AM    PHUR 6.0 06/11/2016 02:30 PM    WBCUA 6-9 11/24/2024 11:41 AM    RBCUA None seen 11/24/2024 11:41 AM    BACTERIA 4+ 11/24/2024 11:41 AM    CLARITYU Clear 11/24/2024 11:41 AM    SPECGRAV 1.020 10/27/2016 12:00 AM    LEUKOCYTESUR SMALL 11/24/2024 11:41 AM    UROBILINOGEN 0.2 11/24/2024 11:41 AM    BILIRUBINUR Negative 11/24/2024 11:41 AM    BLOODU TRACE-INTACT 11/24/2024 11:41 AM    GLUCOSEU Negative 11/24/2024 11:41 AM    KETUA TRACE 11/24/2024 11:41 AM     Urine Cultures:   Lab Results   Component Value Date/Time    LABURIN >100,000 CFU/ml 06/11/2016 02:30 PM     Blood Cultures:   Lab Results   Component Value Date/Time    BC No Growth after 4 days of incubation. 11/24/2024 12:52 PM     Lab Results   Component Value Date/Time    BLOODCULT2 No Growth after 4 days of incubation. 11/24/2024 12:52 PM     Organism:   Lab Results   Component Value Date/Time    ORG Escherichia coli 06/11/2016 02:30 PM         Electronically signed by Margarito Holcomb DO

## 2024-12-02 NOTE — DISCHARGE SUMMARY
V2.0  If patient is not discharged, this note serves as a Progress Note  Discharge Summary    Name:  Zeinab De León /Age/Sex: 1931 (89 y.o. female)   Admit Date: 2024  Discharge Date: 24    MRN & CSN:  [id number] & [id number] Encounter Date and Time 24 12:08 PM EST    Attending:  Hi Ruvalcaba MD Discharging Provider: Margarito Holcomb DO       Hospital Course:     Brief HPI: Zeinab De León is a 89 y.o. female with a pmh of HTN, HLD, Afib, colon cancer s/p right hemicolectomy who presents with Diverticulitis. History provided by patient and son. Patient lives at home with her son. She states that she has felt fatigues and nauseous for the past 2-3 days. She states she has not been eating or drinking much secondary to nausea. She reports minimal abdominal pain. She reports urinary frequency and blood-tinged urine for the past 2-3 days. She reports no fevers, no chills, no vomiting, no diarrhea, no black or bloody stools. She states she has not been admitted to the hospital in several years. She has not recently been on antibiotics.     Brief Problem Based Course:     Diverticulitis  - CT AP (24) showed subtle fat stranding is seen surrounding the sigmoid colon the pelvis,  suggesting acute diverticulitis.  No pericolonic abscess.  - Meropenem started in the ED  - Discontinued meropenem. Managed with IV cipro and IV flagyl. Plan for PO cipro and PO flagyl for a total of 10 days duration upon discharge.  - Zofran PRN for nausea  - Blood cultures x2 NGTD  - Lactic acid 1.2>1.0  - WBC not elevated  - Patient reported resolution of her nausea. Diet was advanced as tolerated     UTI  - Patient with increased urinary frequency and blood tinged urine for the past couple of days  - UA significant for blood, leukocyte esterase, 4+ bacteria  - Treated with IV ciprofloxacin     Elevated Troponin  - Troponin trended 17>15     Afib  - EKG (24) shows Afib with rate of 101, Qtc 238  - 
vessel occlusion, significant stenosis or cerebral aneurysm evident. 2. Penetrating ulcer measuring 1 cm in depth within the aortic arch.  This could represent a source of distal embolization. 3. Atherosclerosis without significant arterial stenosis identified within the neck.     CT HEAD WO CONTRAST    Result Date: 11/27/2024  EXAMINATION: CT OF THE HEAD WITHOUT CONTRAST  11/27/2024 12:27 pm TECHNIQUE: CT of the head was performed without the administration of intravenous contrast. Automated exposure control, iterative reconstruction, and/or weight based adjustment of the mA/kV was utilized to reduce the radiation dose to as low as reasonably achievable. COMPARISON: None. HISTORY: ORDERING SYSTEM PROVIDED HISTORY: dysarthria TECHNOLOGIST PROVIDED HISTORY: Reason for exam:->dysarthria Has a \"code stroke\" or \"stroke alert\" been called?->No Reason for Exam: speech issues FINDINGS: BRAIN/VENTRICLES: Ventricles are midline in position.  There is prominence sulci, compatible with atrophy.  Moderate periventricular hypodensity seen. Scattered additional areas of hypodensity seen throughout the frontal parietal white matter.  There is intracranial atherosclerosis.  No obvious hemorrhage or mass identified.  Basal ganglia calcifications are seen. ORBITS: The visualized portion of the orbits demonstrate no acute abnormality. SINUSES: No significant mastoid opacification noted.  Mild mucosal thickening is seen in the ethmoid air cells.  No air-fluid levels noted SOFT TISSUES/SKULL:  No acute abnormality of the visualized skull or soft tissues.     Atrophy and small-vessel ischemic change.  No definite acute findings noted       CBC:   Recent Labs     11/30/24  0537 12/01/24  0454 12/02/24  0516   WBC 4.7 4.7 5.1   HGB 12.4 12.2 12.4    177 184     BMP:    Recent Labs     11/30/24  0537 12/01/24  0454 12/02/24  0516    138 141   K 3.6 3.3* 3.6    103 105   CO2 24 25 28   BUN 9 9 10   CREATININE 0.9 1.0 1.1

## 2024-12-02 NOTE — CARE COORDINATION
CASE MANAGEMENT DISCHARGE SUMMARY      Discharge to: [facility]    Precertification completed: NA  Hospital Exemption Notification (HENS) completed: Document ID : [id number]     IMM given: 12/1/24    New Durable Medical Equipment ordered/agency: NA    Transportation:    Medical Transport explained to pt/family. Pt/family voice no agency preference.    Agency used:[facility]   time:1400   Ambulance form completed: Yes    Confirmed discharge plan with:     Patient: yes     Family:  yes, son Werner     Facility/Agency, name:  [facility] to obtain from epic   Phone number for report to facility: [phone]     RN, name: Lala    Note: Discharging nurse to complete [facility], reconcile [facility], and place final copy with patient's discharge packet. RN to ensure that written prescriptions for  Level II medications are sent with patient to the facility as per protocol.        
CM update; LOS # 5. Followed by IM, GI, Pharmacy, Speech, PT/OT. Patient is to have MRI of brain , Echo completed. Discharge 1-2 days; [facility] in accepting no pre-cert needed. Will follow.Radha Pinzon RN    
Chart reviewed day 3. Spoke with patient. Stated not feeling well today, very shakey. Reported difficulty getting words out. CT head pending. Plan for skilled stay at [facility] when medically ready. No cert needed. Hilario Muñoz RN      
Chart reviewed. Therapy recs noted for SNF. Spoke with patient and son. Would like referral to [facility]. Done. Waiting determination. Would need 3 MN. Hliario Muñoz RN    [facility] can accept. Patient and son updated and agreeable. Hilario Muñoz RN    
Referred to patient for d/c planning.  Patient continues to plan to go to [facility] on d/c.  No other needs at this time. Electronically signed by VIRGILIO Pace on 12/2/2024 at 9:54 AM   
Issues/Barriers to RETURNING to current housing: none  Potential Assistance needed at discharge: Home Care, Skilled Nursing Facility            Potential DME:    Patient expects to discharge to: House  Plan for transportation at discharge:      Financial    Payor: MEDICARE / Plan: MEDICARE PART A AND B / Product Type: *No Product type* /     Does insurance require precert for SNF: No    Potential assistance Purchasing Medications: No  Meds-to-Beds request:        [facility] [id number] - [facility] [address] - P [phone] - F [phone]  [address] [address]  Phone: [phone] Fax: [phone]    [facility] [id number] - [facility] [address] - P [phone] - F [phone]  [address]  [facility] [address]  Phone: [phone] Fax: [phone]      Notes:    Factors facilitating achievement of predicted outcomes: Family support, Motivated, Cooperative, Pleasant, Sense of humor, and Good insight into deficits    Barriers to discharge: Limited family support, Decreased endurance, Upper extremity weakness, and Lower extremity weakness    Additional Case Management Notes: spoke with patient. Stated lives in ranch style home with son. 1STE. IPTA with ADL's, uses a cane for distance out of home. Son assists with transportation. Stated really weak now. Would like info on COA. Referral made. Hilario Muñoz RN      The Plan for Transition of Care is related to the following treatment goals of Diverticulitis [K57.92]  Diverticulitis of colon [K57.32]  Elevated troponin [R79.89]    IF APPLICABLE: The Patient and/or patient representative Zeinab and her family were provided with a choice of provider and agrees with the discharge plan. Freedom of choice list with basic dialogue that supports the patient's individualized plan of care/goals and shares the quality data associated with the providers was provided to:     Patient Representative 
 delivery delivered

## 2024-12-19 ENCOUNTER — TELEPHONE (OUTPATIENT)
Dept: CARDIOLOGY CLINIC | Age: 88
End: 2024-12-19

## 2024-12-19 NOTE — TELEPHONE ENCOUNTER
Reviewed echo- patient has ulcerative plaque of aortic arch. Would not need to see patient any sooner than planned from an EP standpoint. April would be a 6 month follow up from appointment with [facility] 10/2024.      FERNANDOOV asking for call back

## 2024-12-19 NOTE — TELEPHONE ENCOUNTER
Pts son Werner stated that pt was recently in the hospital at the end of November at [facility]. Pt was d/c to a nursing facility. During her testing at the hospital they noted that pt has an ulcer on her aorta. Werner would like to know if pt needs to be seen sooner than April 10. She is scheduled with [facility] for new pt appt. Werner can be reached at [phone]

## 2025-01-08 ENCOUNTER — OFFICE VISIT (OUTPATIENT)
Dept: FAMILY MEDICINE CLINIC | Age: 89
End: 2025-01-08

## 2025-01-08 VITALS
DIASTOLIC BLOOD PRESSURE: 74 MMHG | RESPIRATION RATE: 16 BRPM | TEMPERATURE: 97.7 F | OXYGEN SATURATION: 97 % | SYSTOLIC BLOOD PRESSURE: 118 MMHG | HEART RATE: 94 BPM

## 2025-01-08 DIAGNOSIS — R25.1 TREMOR: ICD-10-CM

## 2025-01-08 DIAGNOSIS — K57.92 DIVERTICULITIS: ICD-10-CM

## 2025-01-08 DIAGNOSIS — I10 ESSENTIAL HYPERTENSION: Primary | ICD-10-CM

## 2025-01-08 DIAGNOSIS — I10 ESSENTIAL HYPERTENSION: ICD-10-CM

## 2025-01-08 DIAGNOSIS — I48.21 PERMANENT ATRIAL FIBRILLATION (HCC): ICD-10-CM

## 2025-01-08 LAB
ALBUMIN SERPL-MCNC: 4 G/DL (ref 3.4–5)
ALBUMIN/GLOB SERPL: 1.6 {RATIO} (ref 1.1–2.2)
ALP SERPL-CCNC: 86 U/L (ref 40–129)
ALT SERPL-CCNC: 17 U/L (ref 10–40)
ANION GAP SERPL CALCULATED.3IONS-SCNC: 15 MMOL/L (ref 3–16)
AST SERPL-CCNC: 25 U/L (ref 15–37)
BASOPHILS # BLD: 0 K/UL (ref 0–0.2)
BASOPHILS NFR BLD: 1.5 %
BILIRUB SERPL-MCNC: 0.6 MG/DL (ref 0–1)
BUN SERPL-MCNC: 19 MG/DL (ref 7–20)
CALCIUM SERPL-MCNC: 8.7 MG/DL (ref 8.3–10.6)
CHLORIDE SERPL-SCNC: 96 MMOL/L (ref 99–110)
CO2 SERPL-SCNC: 26 MMOL/L (ref 21–32)
CREAT SERPL-MCNC: 1.3 MG/DL (ref 0.6–1.2)
DEPRECATED RDW RBC AUTO: 14.1 % (ref 12.4–15.4)
EOSINOPHIL # BLD: 0 K/UL (ref 0–0.6)
EOSINOPHIL NFR BLD: 0.4 %
GFR SERPLBLD CREATININE-BSD FMLA CKD-EPI: 38 ML/MIN/{1.73_M2}
GLUCOSE SERPL-MCNC: 120 MG/DL (ref 70–99)
HCT VFR BLD AUTO: 39.9 % (ref 36–48)
HGB BLD-MCNC: 13.6 G/DL (ref 12–16)
LYMPHOCYTES # BLD: 0.8 K/UL (ref 1–5.1)
LYMPHOCYTES NFR BLD: 25 %
MAGNESIUM SERPL-MCNC: 1.47 MG/DL (ref 1.8–2.4)
MCH RBC QN AUTO: 33.8 PG (ref 26–34)
MCHC RBC AUTO-ENTMCNC: 34.1 G/DL (ref 31–36)
MCV RBC AUTO: 99.1 FL (ref 80–100)
MONOCYTES # BLD: 0.5 K/UL (ref 0–1.3)
MONOCYTES NFR BLD: 14.2 %
NEUTROPHILS # BLD: 1.9 K/UL (ref 1.7–7.7)
NEUTROPHILS NFR BLD: 58.9 %
PLATELET # BLD AUTO: 150 K/UL (ref 135–450)
PMV BLD AUTO: 11.4 FL (ref 5–10.5)
POTASSIUM SERPL-SCNC: 3.3 MMOL/L (ref 3.5–5.1)
PROT SERPL-MCNC: 6.5 G/DL (ref 6.4–8.2)
RBC # BLD AUTO: 4.03 M/UL (ref 4–5.2)
SODIUM SERPL-SCNC: 137 MMOL/L (ref 136–145)
TSH SERPL DL<=0.005 MIU/L-ACNC: 2.27 UIU/ML (ref 0.27–4.2)
WBC # BLD AUTO: 3.3 K/UL (ref 4–11)

## 2025-01-08 SDOH — ECONOMIC STABILITY: FOOD INSECURITY: WITHIN THE PAST 12 MONTHS, THE FOOD YOU BOUGHT JUST DIDN'T LAST AND YOU DIDN'T HAVE MONEY TO GET MORE.: NEVER TRUE

## 2025-01-08 SDOH — ECONOMIC STABILITY: FOOD INSECURITY: WITHIN THE PAST 12 MONTHS, YOU WORRIED THAT YOUR FOOD WOULD RUN OUT BEFORE YOU GOT MONEY TO BUY MORE.: NEVER TRUE

## 2025-01-08 ASSESSMENT — PATIENT HEALTH QUESTIONNAIRE - PHQ9
2. FEELING DOWN, DEPRESSED OR HOPELESS: NOT AT ALL
SUM OF ALL RESPONSES TO PHQ QUESTIONS 1-9: 0
SUM OF ALL RESPONSES TO PHQ9 QUESTIONS 1 & 2: 0
SUM OF ALL RESPONSES TO PHQ QUESTIONS 1-9: 0
1. LITTLE INTEREST OR PLEASURE IN DOING THINGS: NOT AT ALL
SUM OF ALL RESPONSES TO PHQ QUESTIONS 1-9: 0
SUM OF ALL RESPONSES TO PHQ QUESTIONS 1-9: 0

## 2025-01-08 NOTE — PROGRESS NOTES
Subjective:      Patient ID: Zeinab De León is a 89 y.o. y.o. female.    Post hospital    Dec in [place]  Diverticuliits,, GERD, A0fib    Hospital notes reviewed.    In SNF  son says was doing well until about 5 days,  Nausea and weak,   not the strength shehad      Hand Problem          Chief Complaint   Patient presents with    Diverticulitis     Went to [facility] 11/24/24 - 12/2/24  Currently at [facility] for rehab    Ulcer on aorta    Hand Problem     Shaking - gradual - gotten worse       Allergies   Allergen Reactions    Amoxicillin Hives     Questionable allergy      Sulfa Antibiotics Hives       Past Medical History:   Diagnosis Date    Anemia 2016    Atrial fibrillation (HCC) 2006    RESOLVED, ONE INCIDENT    Cancer (HCC) 2016    colon cancer    Hyperlipidemia     Hypertension     HAS BEEN RUNNING NORMAL    Skin cancer of forehead 11/2021       Past Surgical History:   Procedure Laterality Date    APPENDECTOMY      COLON SURGERY Right 6/7/16    Robotic Assisted Laparoscopic Right Colectomy    COLONOSCOPY  5/18/16    ascending colon mass, biopsy    FRACTURE SURGERY      Right ORIF ANKLE    HYSTERECTOMY (CERVIX STATUS UNKNOWN)      BSO    UPPER GASTROINTESTINAL ENDOSCOPY  5/18/16    biopsy gastric       Social History     Socioeconomic History    Marital status:      Spouse name: Not on file    Number of children: Not on file    Years of education: Not on file    Highest education level: Not on file   Occupational History    Not on file   Tobacco Use    Smoking status: Never     Passive exposure: Past    Smokeless tobacco: Never   Vaping Use    Vaping status: Never Used   Substance and Sexual Activity    Alcohol use: No     Alcohol/week: 0.0 standard drinks of alcohol    Drug use: No    Sexual activity: Not Currently   Other Topics Concern    Not on file   Social History Narrative    Not on file     Social Determinants of Health     Financial Resource Strain: Low Risk  (11/1/2023)    Overall

## 2025-01-13 ENCOUNTER — TELEPHONE (OUTPATIENT)
Dept: FAMILY MEDICINE CLINIC | Age: 89
End: 2025-01-13

## 2025-01-13 NOTE — TELEPHONE ENCOUNTER
Fidel Black, Chasidy Hensley MA  Please fax her recent labs to the [facility] where she currently is.  Report from them is in media section.  Thanks      Done.

## 2025-01-13 NOTE — TELEPHONE ENCOUNTER
Zeinab BROCK [facility] Practice Support (supporting Fidel Black DO)57 minutes ago (10:59 AM)       Dr Black , This is Werner Quinn Zeinab’s son . Now that we have lab results , could you please give me a plan on what we do to treat her . At this point there is no improvement since office visit . I look forward to your response. If you need to call me please don’t hesitate.  [phone]        Please advise on recent labs

## 2025-01-15 NOTE — TELEPHONE ENCOUNTER
Called yesterday and left message for her son.  Nursing home was advised orally on the 11th and then labs were faxed on the 13 th.

## 2025-01-21 ENCOUNTER — PATIENT MESSAGE (OUTPATIENT)
Dept: FAMILY MEDICINE CLINIC | Age: 89
End: 2025-01-21

## 2025-01-24 ENCOUNTER — TELEPHONE (OUTPATIENT)
Dept: FAMILY MEDICINE CLINIC | Age: 89
End: 2025-01-24

## 2025-01-24 NOTE — TELEPHONE ENCOUNTER
Bridgett from [facility] nursing called in Follow up appointment made on February 4th at 4 pm. Discharged on Monday January 27th. Needs to be seen to review medication and discharge information

## 2025-01-29 RX ORDER — POTASSIUM CHLORIDE 1500 MG/1
20 TABLET, EXTENDED RELEASE ORAL DAILY
Qty: 60 TABLET | Refills: 3 | Status: SHIPPED | OUTPATIENT
Start: 2025-01-29

## 2025-01-29 NOTE — TELEPHONE ENCOUNTER
Patient needs a refill, also wants to know if she can have something for peeing multiple times a night       potassium chloride (KLOR-CON M) extended release tablet 20 mEq     Coumadin 2.5 daily per facility        90 day  Heberelisabeth cumminsfran    Future Appointments   Date Time Provider Department Center   2/4/2025  4:00 PM Linda Quinn APRN - CNP [facility]   4/10/2025 11:15 AM REED Montes De Oca Jr., MD [facility]

## 2025-01-29 NOTE — TELEPHONE ENCOUNTER
Kaylee lemus/ Nursing called back.     Do you want INR on Friday when the nurse come in to check on her?    Please advise.

## 2025-01-31 ENCOUNTER — TELEPHONE (OUTPATIENT)
Dept: FAMILY MEDICINE CLINIC | Age: 89
End: 2025-01-31

## 2025-01-31 NOTE — TELEPHONE ENCOUNTER
Amy from [facility] wants to know if Dr. Black will follow orders for Occupational therapy once a week for five weeks.

## 2025-01-31 NOTE — TELEPHONE ENCOUNTER
[facility] is calling to let dr godoy know her INR was 1.3, she want a call back today since its the weekend and she needs to know what to do

## 2025-02-03 DIAGNOSIS — I48.21 PERMANENT ATRIAL FIBRILLATION (HCC): ICD-10-CM

## 2025-02-03 RX ORDER — WARFARIN SODIUM 4 MG/1
TABLET ORAL
Qty: 90 TABLET | Refills: 0 | Status: SHIPPED | OUTPATIENT
Start: 2025-02-03

## 2025-02-03 NOTE — TELEPHONE ENCOUNTER
Spoke to patient.  She is taking 4 mg of warfarin  a day.  Advised her to take an extra 1/2 tablet on Tuesdays and Fridays.  Will get a follow-up INR in about 2 weeks.    Medical assistant, please call the home nursing people and advise him of this dose change on the warfarin

## 2025-02-04 ENCOUNTER — OFFICE VISIT (OUTPATIENT)
Dept: FAMILY MEDICINE CLINIC | Age: 89
End: 2025-02-04

## 2025-02-04 VITALS
TEMPERATURE: 98.6 F | BODY MASS INDEX: 24.65 KG/M2 | WEIGHT: 126.2 LBS | DIASTOLIC BLOOD PRESSURE: 72 MMHG | OXYGEN SATURATION: 97 % | SYSTOLIC BLOOD PRESSURE: 128 MMHG | HEART RATE: 110 BPM | RESPIRATION RATE: 16 BRPM

## 2025-02-04 DIAGNOSIS — K57.92 DIVERTICULITIS: ICD-10-CM

## 2025-02-04 DIAGNOSIS — Z09 HOSPITAL DISCHARGE FOLLOW-UP: Primary | ICD-10-CM

## 2025-02-04 DIAGNOSIS — I48.21 PERMANENT ATRIAL FIBRILLATION (HCC): ICD-10-CM

## 2025-02-04 DIAGNOSIS — N39.44 NOCTURNAL ENURESIS: ICD-10-CM

## 2025-02-04 DIAGNOSIS — I10 ESSENTIAL HYPERTENSION: ICD-10-CM

## 2025-02-04 RX ORDER — MAGNESIUM 30 MG
30 TABLET ORAL 2 TIMES DAILY
COMMUNITY

## 2025-02-04 SDOH — ECONOMIC STABILITY: FOOD INSECURITY: WITHIN THE PAST 12 MONTHS, THE FOOD YOU BOUGHT JUST DIDN'T LAST AND YOU DIDN'T HAVE MONEY TO GET MORE.: NEVER TRUE

## 2025-02-04 SDOH — ECONOMIC STABILITY: FOOD INSECURITY: WITHIN THE PAST 12 MONTHS, YOU WORRIED THAT YOUR FOOD WOULD RUN OUT BEFORE YOU GOT MONEY TO BUY MORE.: NEVER TRUE

## 2025-02-04 ASSESSMENT — PATIENT HEALTH QUESTIONNAIRE - PHQ9
2. FEELING DOWN, DEPRESSED OR HOPELESS: NOT AT ALL
SUM OF ALL RESPONSES TO PHQ QUESTIONS 1-9: 0
SUM OF ALL RESPONSES TO PHQ QUESTIONS 1-9: 0
SUM OF ALL RESPONSES TO PHQ9 QUESTIONS 1 & 2: 0
SUM OF ALL RESPONSES TO PHQ QUESTIONS 1-9: 0
1. LITTLE INTEREST OR PLEASURE IN DOING THINGS: NOT AT ALL
SUM OF ALL RESPONSES TO PHQ QUESTIONS 1-9: 0

## 2025-02-04 NOTE — PROGRESS NOTES
Post-Discharge Transitional Care  Follow Up      Zeinab De León   YOB: 1931    Date of Office Visit:  2/4/2025  Date of Hospital Admission: 11/24/24  Date of Hospital Discharge: 12/2/24  Risk of hospital readmission (high >=14%. Medium >=10%) :Readmission Risk Score: 11.8    She was discharged from the hospital to [facility]; was discharged home on 1/27/25.  Lives with her son, Werner.   Active with [facility].     Care management risk score Rising risk (score 2-5) and Complex Care (Scores >=6): No Risk Score On File     Non face to face  following discharge, date last encounter closed (first attempt may have been earlier): *No documented post hospital discharge outreach found in the last 14 days    Call initiated 2 business days of discharge: *No response recorded in the last 14 days    ASSESSMENT/PLAN:   Hospital discharge follow-up  -     [place] DISCHARGE MEDS RECONCILED W/ CURRENT OUTPATIENT MED LIST  Essential hypertension  -     CBC with Auto Differential; Future  -     Magnesium; Future  -     Basic Metabolic Panel; Future  Permanent atrial fibrillation (HCC)  -     CBC with Auto Differential; Future  -     Magnesium; Future  -     Basic Metabolic Panel; Future  Diverticulitis  Nocturnal enuresis  -     POCT Urinalysis no Micro    - She was unable to leave a urine specimen for us today. Sent her home with supplies to obtain a urine specimen.    Medical Decision Making: moderate complexity  Return in 1 month (on 3/4/2025).           BP Readings from Last 3 Encounters:   02/04/25 128/72   01/08/25 118/74   12/02/24 (!) 161/89      Wt Readings from Last 3 Encounters:   02/04/25 57.2 kg (126 lb 3.2 oz)   11/24/24 59.9 kg (132 lb)   10/09/24 59.2 kg (130 lb 9.6 oz)        Subjective:   HPI:  Follow up of Hospital problems/diagnosis(es): Diverticulitis. Was having nausea & vomiting. UTI.     Inpatient course: Discharge summary reviewed- see chart.    Interval history/Current status: States that she has

## 2025-02-05 ENCOUNTER — TELEPHONE (OUTPATIENT)
Dept: FAMILY MEDICINE CLINIC | Age: 89
End: 2025-02-05

## 2025-02-05 NOTE — TELEPHONE ENCOUNTER
Juanito with [facility] called.  Patient has been taking 4 mg of Coumadin every day.  She never changed the dose to 6 mg on Tues & Fridays as requested.    Verbal order given to check pt's INR this Friday 2/7/25  Will call us with the result & order to adjust coumadin as needed

## 2025-02-06 ENCOUNTER — TELEPHONE (OUTPATIENT)
Dept: FAMILY MEDICINE CLINIC | Age: 89
End: 2025-02-06

## 2025-02-06 DIAGNOSIS — R33.9 URINARY RETENTION: Primary | ICD-10-CM

## 2025-02-06 DIAGNOSIS — I48.21 PERMANENT ATRIAL FIBRILLATION (HCC): ICD-10-CM

## 2025-02-06 DIAGNOSIS — N39.44 NOCTURNAL ENURESIS: ICD-10-CM

## 2025-02-06 DIAGNOSIS — I10 ESSENTIAL HYPERTENSION: ICD-10-CM

## 2025-02-06 DIAGNOSIS — R33.9 URINARY RETENTION: ICD-10-CM

## 2025-02-06 LAB
ANION GAP SERPL CALCULATED.3IONS-SCNC: 10 MMOL/L (ref 3–16)
BASOPHILS # BLD: 0.1 K/UL (ref 0–0.2)
BASOPHILS NFR BLD: 1.3 %
BILIRUB UR QL STRIP.AUTO: NEGATIVE
BUN SERPL-MCNC: 20 MG/DL (ref 7–20)
CALCIUM SERPL-MCNC: 9.4 MG/DL (ref 8.3–10.6)
CHLORIDE SERPL-SCNC: 107 MMOL/L (ref 99–110)
CLARITY UR: CLEAR
CO2 SERPL-SCNC: 26 MMOL/L (ref 21–32)
COLOR UR: YELLOW
CREAT SERPL-MCNC: 1.1 MG/DL (ref 0.6–1.2)
DEPRECATED RDW RBC AUTO: 15.4 % (ref 12.4–15.4)
EOSINOPHIL # BLD: 0.1 K/UL (ref 0–0.6)
EOSINOPHIL NFR BLD: 2.3 %
GFR SERPLBLD CREATININE-BSD FMLA CKD-EPI: 47 ML/MIN/{1.73_M2}
GLUCOSE SERPL-MCNC: 141 MG/DL (ref 70–99)
GLUCOSE UR STRIP.AUTO-MCNC: NEGATIVE MG/DL
HCT VFR BLD AUTO: 38 % (ref 36–48)
HGB BLD-MCNC: 12.6 G/DL (ref 12–16)
HGB UR QL STRIP.AUTO: NEGATIVE
KETONES UR STRIP.AUTO-MCNC: NEGATIVE MG/DL
LEUKOCYTE ESTERASE UR QL STRIP.AUTO: NEGATIVE
LYMPHOCYTES # BLD: 1.2 K/UL (ref 1–5.1)
LYMPHOCYTES NFR BLD: 25.2 %
MAGNESIUM SERPL-MCNC: 1.71 MG/DL (ref 1.8–2.4)
MCH RBC QN AUTO: 33.9 PG (ref 26–34)
MCHC RBC AUTO-ENTMCNC: 33.1 G/DL (ref 31–36)
MCV RBC AUTO: 102.4 FL (ref 80–100)
MONOCYTES # BLD: 0.5 K/UL (ref 0–1.3)
MONOCYTES NFR BLD: 11.6 %
NEUTROPHILS # BLD: 2.8 K/UL (ref 1.7–7.7)
NEUTROPHILS NFR BLD: 59.6 %
NITRITE UR QL STRIP.AUTO: NEGATIVE
PH UR STRIP.AUTO: 8 [PH] (ref 5–8)
PLATELET # BLD AUTO: 209 K/UL (ref 135–450)
PMV BLD AUTO: 10.1 FL (ref 5–10.5)
POTASSIUM SERPL-SCNC: 4.2 MMOL/L (ref 3.5–5.1)
PROT UR STRIP.AUTO-MCNC: NEGATIVE MG/DL
RBC # BLD AUTO: 3.71 M/UL (ref 4–5.2)
SODIUM SERPL-SCNC: 143 MMOL/L (ref 136–145)
SP GR UR STRIP.AUTO: 1.01 (ref 1–1.03)
UA DIPSTICK W REFLEX MICRO PNL UR: NORMAL
URN SPEC COLLECT METH UR: NORMAL
UROBILINOGEN UR STRIP-ACNC: 0.2 E.U./DL
WBC # BLD AUTO: 4.6 K/UL (ref 4–11)

## 2025-02-06 NOTE — TELEPHONE ENCOUNTER
Juliette from [facility] calling in for verbal orders on pt. Eight weeks of physical therapy.

## 2025-02-06 NOTE — TELEPHONE ENCOUNTER
Pls tell them OK   Received patient to [facility], s/p MRI under anesthesia, patent  Asleep, VSS,

## 2025-02-06 NOTE — TELEPHONE ENCOUNTER
Jt from [facility] calling in for verbal orders for Speech therapy once a week for 5 weeks effective February 4th. Will Dr. Black follow?

## 2025-02-07 ENCOUNTER — TELEPHONE (OUTPATIENT)
Dept: FAMILY MEDICINE CLINIC | Age: 89
End: 2025-02-07

## 2025-02-07 LAB — BACTERIA UR CULT: NORMAL

## 2025-02-07 NOTE — TELEPHONE ENCOUNTER
Called pt spoke with son, instructions given and sent via my chart per Pt's son who's has access to this.

## 2025-02-07 NOTE — TELEPHONE ENCOUNTER
She needs to increase the coumadin to 6 mg on Tuesday, Friday, and Sunday. She should take 4 mg on Monday, Wednesday, Thursday and Saturday.  She needs a repeat INR in 2 weeks

## 2025-02-20 ENCOUNTER — TELEPHONE (OUTPATIENT)
Dept: FAMILY MEDICINE CLINIC | Age: 89
End: 2025-02-20

## 2025-02-20 NOTE — TELEPHONE ENCOUNTER
Called & spoke to Simone with [facility]  I let her know that I have been trying to fax many orders on multiple patients for about a week, every day multiple times trying.  She provided a secure email to send them all:  [email or web address]    Done.

## 2025-02-20 NOTE — TELEPHONE ENCOUNTER
Simone with [facility] called and said she received 55 e-mails from Chasidy. She said thank you.

## 2025-02-20 NOTE — TELEPHONE ENCOUNTER
Simone lemus/ [facility] # [phone]  Needing a signature for   Treatment plan of 1-29-25 - 3-29-25  And 9 verbal orders that they faxed over on 1-29-25 Some will be on the plan of care and some won't. 1-29-25-2-12-25 faxed   They have 30 days from medicare to get them sign.   Medicare asking them for a review and this can not be done until the plan of care and verbal orders are  signed.

## 2025-02-21 ENCOUNTER — TELEPHONE (OUTPATIENT)
Dept: FAMILY MEDICINE CLINIC | Age: 89
End: 2025-02-21

## 2025-02-21 NOTE — TELEPHONE ENCOUNTER
Juanito from [facility] Called in for the INR and needs a call back on any dosage changes and when to recheck her.

## 2025-02-24 NOTE — TELEPHONE ENCOUNTER
Juanito called back with [facility]  She states pt's INR was 4.0  Pt has been taking 4 mg nightly except on   Sunday, Tues & Friday she takes 6 mg    Please advise.

## 2025-02-25 NOTE — TELEPHONE ENCOUNTER
Juanito is calling back for an answer, she said if yo call back and she doesn't answer please leave a VM

## 2025-02-26 NOTE — TELEPHONE ENCOUNTER
[facility]ito with [facility] called.  She states she will do another INR this Friday since it has been so long now.    She is asking for you to respond in managing coumadin.

## 2025-02-26 NOTE — TELEPHONE ENCOUNTER
Dr. Black left a vm with Juanito yesterday with instructions    LM for Juanito to cb    Per Dr. Black, pt to hold coumadin for 2 days then resume by taking 4 mg daily.  After 1 week of this, then get another INR.   This Friday is not going to show anything since no changes were made.

## 2025-02-26 NOTE — TELEPHONE ENCOUNTER
Called [facility] thinking we must have the wrong number.  It was off by one digit.    Called & spoke with Juanito.  Information given.  Next INR will be Tuesday, 3/4/25

## 2025-03-04 ENCOUNTER — TELEPHONE (OUTPATIENT)
Dept: FAMILY MEDICINE CLINIC | Age: 89
End: 2025-03-04

## 2025-03-04 ENCOUNTER — OFFICE VISIT (OUTPATIENT)
Dept: FAMILY MEDICINE CLINIC | Age: 89
End: 2025-03-04
Payer: MEDICARE

## 2025-03-04 VITALS
OXYGEN SATURATION: 96 % | RESPIRATION RATE: 16 BRPM | DIASTOLIC BLOOD PRESSURE: 78 MMHG | TEMPERATURE: 98.6 F | SYSTOLIC BLOOD PRESSURE: 138 MMHG | WEIGHT: 123 LBS | HEART RATE: 75 BPM | BODY MASS INDEX: 24.02 KG/M2

## 2025-03-04 DIAGNOSIS — I48.21 PERMANENT ATRIAL FIBRILLATION (HCC): ICD-10-CM

## 2025-03-04 DIAGNOSIS — E78.2 MIXED HYPERLIPIDEMIA: ICD-10-CM

## 2025-03-04 DIAGNOSIS — I10 ESSENTIAL HYPERTENSION: Primary | ICD-10-CM

## 2025-03-04 DIAGNOSIS — K21.9 GASTROESOPHAGEAL REFLUX DISEASE WITHOUT ESOPHAGITIS: ICD-10-CM

## 2025-03-04 PROCEDURE — 1036F TOBACCO NON-USER: CPT

## 2025-03-04 PROCEDURE — 99214 OFFICE O/P EST MOD 30 MIN: CPT

## 2025-03-04 PROCEDURE — G8420 CALC BMI NORM PARAMETERS: HCPCS

## 2025-03-04 PROCEDURE — 1123F ACP DISCUSS/DSCN MKR DOCD: CPT

## 2025-03-04 PROCEDURE — 1160F RVW MEDS BY RX/DR IN RCRD: CPT

## 2025-03-04 PROCEDURE — 1159F MED LIST DOCD IN RCRD: CPT

## 2025-03-04 PROCEDURE — 1090F PRES/ABSN URINE INCON ASSESS: CPT

## 2025-03-04 PROCEDURE — G8427 DOCREV CUR MEDS BY ELIG CLIN: HCPCS

## 2025-03-04 RX ORDER — OMEGA-3S/DHA/EPA/FISH OIL/D3 300MG-1000
400 CAPSULE ORAL DAILY
COMMUNITY

## 2025-03-04 RX ORDER — OMEPRAZOLE 40 MG/1
40 CAPSULE, DELAYED RELEASE ORAL DAILY
Qty: 90 CAPSULE | Refills: 1 | Status: SHIPPED | OUTPATIENT
Start: 2025-03-04

## 2025-03-04 RX ORDER — ATORVASTATIN CALCIUM 20 MG/1
20 TABLET, FILM COATED ORAL DAILY
Qty: 90 TABLET | Refills: 1 | Status: SHIPPED | OUTPATIENT
Start: 2025-03-04

## 2025-03-04 RX ORDER — DILTIAZEM HYDROCHLORIDE 240 MG/1
240 CAPSULE, COATED, EXTENDED RELEASE ORAL DAILY
Qty: 90 CAPSULE | Refills: 1 | Status: SHIPPED | OUTPATIENT
Start: 2025-03-04

## 2025-03-04 SDOH — ECONOMIC STABILITY: FOOD INSECURITY: WITHIN THE PAST 12 MONTHS, YOU WORRIED THAT YOUR FOOD WOULD RUN OUT BEFORE YOU GOT MONEY TO BUY MORE.: NEVER TRUE

## 2025-03-04 SDOH — ECONOMIC STABILITY: FOOD INSECURITY: WITHIN THE PAST 12 MONTHS, THE FOOD YOU BOUGHT JUST DIDN'T LAST AND YOU DIDN'T HAVE MONEY TO GET MORE.: NEVER TRUE

## 2025-03-04 ASSESSMENT — ENCOUNTER SYMPTOMS
SHORTNESS OF BREATH: 0
ABDOMINAL PAIN: 0
NAUSEA: 0
COUGH: 0
VOMITING: 0

## 2025-03-04 ASSESSMENT — PATIENT HEALTH QUESTIONNAIRE - PHQ9
SUM OF ALL RESPONSES TO PHQ QUESTIONS 1-9: 0
SUM OF ALL RESPONSES TO PHQ QUESTIONS 1-9: 0
2. FEELING DOWN, DEPRESSED OR HOPELESS: NOT AT ALL
SUM OF ALL RESPONSES TO PHQ QUESTIONS 1-9: 0
1. LITTLE INTEREST OR PLEASURE IN DOING THINGS: NOT AT ALL
SUM OF ALL RESPONSES TO PHQ QUESTIONS 1-9: 0

## 2025-03-04 NOTE — PROGRESS NOTES
time.   Psychiatric:         Mood and Affect: Mood normal.         Behavior: Behavior normal.         CLAIRE Flanagan - CNP

## 2025-03-04 NOTE — TELEPHONE ENCOUNTER
Her INR for her was 1.9 and would like a call back for any new order and when to check her INR again. Has an appointment today with Minerva Quinn     Last Visit:1/8/2025    Future Appointments   Date Time Provider Department Center   3/4/2025  3:00 PM Linda Quinn APRN - CNP [facility]   4/10/2025 11:15 AM REED Montes De Oca Jr., MD [facility]

## 2025-03-04 NOTE — TELEPHONE ENCOUNTER
I saw the patient today in the office for a follow-up visit.    If my understanding is correct, she had been taking 34mg total weekly of her coumadin.  Mon/Wed/Th/Sat: 4mg  Sun/T/Friday: 6mg.  However, was recently adjusted to 4mg daily due to supra-therapeutic INR.    INR today is 1.9.  We need to increase her dose by at least 10%.    Please let Juanito from [facility] know that we can have her take a dose of the 6mg on Wednesday & Saturday this week instead of the 4mg.  Stick with the 4mg the other days.  Recheck Thursday or Friday next week.

## 2025-03-14 ENCOUNTER — TELEPHONE (OUTPATIENT)
Dept: FAMILY MEDICINE CLINIC | Age: 89
End: 2025-03-14

## 2025-03-14 NOTE — TELEPHONE ENCOUNTER
Juanito from [facility] called to report the patients INR from today.     INR 3.4    Please call back and advise.  She ask that a practitioner who is here today review and call her back.

## 2025-03-14 NOTE — TELEPHONE ENCOUNTER
Pt should take 4 mg coumadin  on Monday, tues, wed, thurs, Friday and Sunday. She should take 6 mg on Saturday and recheck in 1 week

## 2025-03-21 ENCOUNTER — TELEPHONE (OUTPATIENT)
Dept: FAMILY MEDICINE CLINIC | Age: 89
End: 2025-03-21

## 2025-03-21 DIAGNOSIS — R25.1 TREMOR: Primary | ICD-10-CM

## 2025-03-21 NOTE — TELEPHONE ENCOUNTER
Juanito with [facility] called with INR results from today 3.5.    She also said the patient is saying her hands are shaking more and more. Do you think she needs a neurology referral?    She is asking for a call back today regarding the INR. If Dr Black is not able to please have another provider respond.

## 2025-03-21 NOTE — TELEPHONE ENCOUNTER
Called & spoke to Juanito with [facility]  Last week the dose was changed by Maria Isabel Aponte  4 mg for 6 days a week  6 mg once a week on Saturdays    Patient to hold tonights dose and will take 4 mg daily     Please advise if that needs to be different    Juanito states the patient has not seen a Neurologist  Please place referral

## 2025-03-21 NOTE — TELEPHONE ENCOUNTER
Pls call thepatient and Juanito  Looks like her warfarin dose is 4 mg five days a week and 6 mg 2 days a week.  If that is right, have her just take 4 mg daily.  Do not take one dose of the warfarin and then just 4 mg daily.  INR in 2 week.    We can get a neurology about the tremor / shaking, if the patient agreeable.  See if she has ever seen a neurologist ?  thx

## 2025-03-25 DIAGNOSIS — I48.21 PERMANENT ATRIAL FIBRILLATION (HCC): ICD-10-CM

## 2025-03-25 RX ORDER — WARFARIN SODIUM 4 MG/1
4 TABLET ORAL DAILY
Qty: 90 TABLET | Refills: 0 | Status: SHIPPED | OUTPATIENT
Start: 2025-03-25

## 2025-03-25 NOTE — TELEPHONE ENCOUNTER
Future Appointments   Date Time Provider Department Center   4/10/2025 11:15 AM REED Montes De Oca Jr., MD [facility] 3/4/2025     Prep Survey      Responses   [facility] patient discharged from? [place]   Is LACE score < 7 ? No   Emergency Room discharge w/ pulse ox? No   Eligibility [facility]   Date of Admission 01/30/22   Date of Discharge 02/05/22   Discharge Disposition Home or Self Care   Discharge diagnosis Healthcare-associated pneumonia,    Enterococcus bacteremia,    NSTEMI,   acute exacerbation of chronic diastolic heart failure    Does the patient have one of the following disease processes/diagnoses(primary or secondary)? COPD/Pneumonia   Does the patient have Home health ordered? Yes   What is the Home health agency?  [facility]   Is there a DME ordered? No   Prep survey completed? Yes          Belén Dominguez RN

## 2025-03-26 ENCOUNTER — PATIENT MESSAGE (OUTPATIENT)
Dept: FAMILY MEDICINE CLINIC | Age: 89
End: 2025-03-26

## 2025-03-28 ENCOUNTER — TELEPHONE (OUTPATIENT)
Dept: FAMILY MEDICINE CLINIC | Age: 89
End: 2025-03-28

## 2025-03-28 NOTE — TELEPHONE ENCOUNTER
Juanito lemus/[facility] called. She re certified the patient for SN - 9 weeks once a week.     Call back with verbal that you will continue to sign orders.

## 2025-04-04 ENCOUNTER — TELEPHONE (OUTPATIENT)
Dept: FAMILY MEDICINE CLINIC | Age: 89
End: 2025-04-04

## 2025-04-04 NOTE — TELEPHONE ENCOUNTER
Juanito with [facility] called to report the patients INR today. 3.0      Please call back and advise. If Dr Black can not address today they would like another provider to address.

## 2025-04-10 ENCOUNTER — OFFICE VISIT (OUTPATIENT)
Dept: CARDIOLOGY CLINIC | Age: 89
End: 2025-04-10
Payer: MEDICARE

## 2025-04-10 ENCOUNTER — TELEPHONE (OUTPATIENT)
Dept: FAMILY MEDICINE CLINIC | Age: 89
End: 2025-04-10

## 2025-04-10 VITALS
OXYGEN SATURATION: 97 % | DIASTOLIC BLOOD PRESSURE: 82 MMHG | SYSTOLIC BLOOD PRESSURE: 114 MMHG | WEIGHT: 127 LBS | BODY MASS INDEX: 24.94 KG/M2 | HEART RATE: 77 BPM | HEIGHT: 60 IN

## 2025-04-10 DIAGNOSIS — I48.21 PERMANENT ATRIAL FIBRILLATION (HCC): Primary | ICD-10-CM

## 2025-04-10 LAB
EKG DIAGNOSIS: NORMAL
EKG Q-T INTERVAL: 384 MS
EKG QRS DURATION: 68 MS
EKG QTC CALCULATION (BAZETT): 434 MS
EKG R AXIS: -14 DEGREES
EKG T AXIS: -15 DEGREES
EKG VENTRICULAR RATE: 77 BPM

## 2025-04-10 PROCEDURE — G8427 DOCREV CUR MEDS BY ELIG CLIN: HCPCS | Performed by: INTERNAL MEDICINE

## 2025-04-10 PROCEDURE — 1123F ACP DISCUSS/DSCN MKR DOCD: CPT | Performed by: INTERNAL MEDICINE

## 2025-04-10 PROCEDURE — 99214 OFFICE O/P EST MOD 30 MIN: CPT | Performed by: INTERNAL MEDICINE

## 2025-04-10 PROCEDURE — 1090F PRES/ABSN URINE INCON ASSESS: CPT | Performed by: INTERNAL MEDICINE

## 2025-04-10 PROCEDURE — G8420 CALC BMI NORM PARAMETERS: HCPCS | Performed by: INTERNAL MEDICINE

## 2025-04-10 PROCEDURE — 1036F TOBACCO NON-USER: CPT | Performed by: INTERNAL MEDICINE

## 2025-04-10 NOTE — TELEPHONE ENCOUNTER
Juanito with [facility] returning someone's call.Please call Juanito back.   No phone note of who may have called her back.

## 2025-04-10 NOTE — PROGRESS NOTES
[facility]   Electrophysiology Consult Note              Date: 4/10/25  Patient Name: Zeinab De León  YOB: 1931    Primary Care Physician: Fidel Black DO    CHIEF COMPLAINT:   Chief Complaint   Patient presents with    New Patient    Atrial Fibrillation       HISTORY OF PRESENT ILLNESS: Zeinab De León is a 89 y.o. female with a past medical history of permanent atrial fibrillation. She has a past medical history of HTN, HLD, colon cancer, and permanent AF. She was originally admitted in 6/2016 for right colectomy. While hospitalized she was found to have AF with RVR. She was started on coumadin, lopressor and diltiazem upon DC. At following office visits in 7/2016 and 8/2016 she was noted to be in sinus bradycardia and her lopressor and diltiazem were decreased. She was noted to be in asymptomatic AF at subsequent office visits. Her metoprolol was stopped in 8/2017 d/t fatigue and dizziness. At office visit in 9/2019 she was noted to remain in AF with HR 86 BPM. [facility].   Patient wore a cardiac event monitor from 09/28/2020 to 09/30/2020 which demonstrated predominately AF with an average HR of 76 (). PAC burden 0%, PVC burden 0.01%.  Echo 5/2023 demonstrated an LVEF of 55% and 4 cm dilation of ascending aorta.    Echo 11/2024 demonstrates 50-55%.    Today, 4/10/2025, ECG demonstrated AF 77 BPM. She presents today with a walker. She reports she is doing well. Patient is taking all cardiac medications as prescribed and tolerates them well. Patient denies current edema, chest pain, palpitations, dizziness or syncope.     Past Medical History:   has a past medical history of Anemia, Atrial fibrillation (HCC), Cancer (HCC), Hyperlipidemia, Hypertension, and Skin cancer of forehead.    Past Surgical History:   has a past surgical history that includes Appendectomy; Hysterectomy; Colonoscopy (5/18/16); Upper gastrointestinal endoscopy

## 2025-04-10 NOTE — TELEPHONE ENCOUNTER
[facility] ([place]) called in regarding pt INR (3.5) and wants a call back today if possible from any practitioner.

## 2025-04-10 NOTE — TELEPHONE ENCOUNTER
Need to confirm what her current dose of warfarin is please.  Also no warfarin today, then we will advise further when I know trhe dose,   thx

## 2025-04-11 ENCOUNTER — TELEPHONE (OUTPATIENT)
Dept: FAMILY MEDICINE CLINIC | Age: 89
End: 2025-04-11

## 2025-04-11 NOTE — TELEPHONE ENCOUNTER
Pt was to hold the warfarin one day.  Taking 4 mg daily  INR 3.5   Change warfarin to 4 mg alternating with 2 mg daily.  Break the 4 mg in half, the tablets are usually scored for breaking   recheck INR in 10 days     New dose ,   2 mg alternating with 4 mg daily

## 2025-04-11 NOTE — TELEPHONE ENCOUNTER
Received a call from Juanito at [facility] regarding warfarin dosing. Informed Juanito Black wants pt to hold coumadin today and he will adjust the dose when he is aware of pts current warfain dose of 4 mg and  Dr. Black or associate will adjust the dose tomorrow during office hours.

## 2025-04-18 ENCOUNTER — TELEPHONE (OUTPATIENT)
Dept: FAMILY MEDICINE CLINIC | Age: 89
End: 2025-04-18

## 2025-04-18 NOTE — TELEPHONE ENCOUNTER
Per previous telephone encounter when her INR was up to 3.5, she has been alternating the 4mg and 2mg dose of her coumadin.    I would like for her to resume taking the 4mg daily and stay with the 2mg dose on Monday & Wednesday of next week.  Repeat INR in 10 days-- Monday 4/28.    Thank you!

## 2025-04-18 NOTE — TELEPHONE ENCOUNTER
Juanito with [facility] called. Patients INR from today was 1.7. please advise.     She would like this addressed by another provider since Dr Black is out of the office.

## 2025-04-18 NOTE — TELEPHONE ENCOUNTER
Called & spoke to Juanito with [facility]  She will be able to recheck on the following Tues, 4/29/25

## 2025-04-30 ENCOUNTER — TELEPHONE (OUTPATIENT)
Dept: FAMILY MEDICINE CLINIC | Age: 89
End: 2025-04-30

## 2025-05-05 ENCOUNTER — TELEPHONE (OUTPATIENT)
Dept: FAMILY MEDICINE CLINIC | Age: 89
End: 2025-05-05

## 2025-05-05 NOTE — TELEPHONE ENCOUNTER
Juanito with [facility] called.  Patient has a small abrasion left lower leg  2 x a week for wound care  Cleaning with wound wash,  Xeroform and island dressing and changing twice weekly until healed.  Please call with verbal

## 2025-05-15 ENCOUNTER — TELEPHONE (OUTPATIENT)
Dept: FAMILY MEDICINE CLINIC | Age: 89
End: 2025-05-15

## 2025-05-15 NOTE — TELEPHONE ENCOUNTER
Tell the patient the INR is 2.4, where we want it    Confirm her warfarin dose and put itin then note.  thanks

## 2025-05-15 NOTE — TELEPHONE ENCOUNTER
Juanito with [facility] called. The patients INR from Tues evening was 2.4.      please call back and advise with any changes. She is asking that another provider review if Dr Black is out of the office.

## 2025-05-16 NOTE — TELEPHONE ENCOUNTER
I spoke to patient and advised of her INR She advised she was taking her Warfarin  Monday and Wednesday  mg The rest of the week  she is taking 4mg

## 2025-05-28 ENCOUNTER — TELEPHONE (OUTPATIENT)
Dept: FAMILY MEDICINE CLINIC | Age: 89
End: 2025-05-28

## 2025-05-28 NOTE — TELEPHONE ENCOUNTER
Home health is calling, INR yesterday was 2.8 if her meds need changed let her know. She is also asking  when to recheck?         They also need new orders for skilled nursing for 9 weeks please advise

## 2025-05-29 NOTE — TELEPHONE ENCOUNTER
Called & spoke to Juanito with [facility].  She states the patient & son are wanting to stay on same dosage  4 mg 5 days a week & 2 mg 2 days a week since she is in range  Would that be ok?    Also needing new orders for skilled nursing x 9 wks please

## 2025-05-29 NOTE — TELEPHONE ENCOUNTER
Tell them OK.  But need to careful as she is at the upper end of the range we want her.  Recheck no more  than 2 weeks.

## 2025-05-29 NOTE — TELEPHONE ENCOUNTER
Pls call [facility].  Make the warfarin dose 4 mg  three days a week and 2 mg 3 days a week,    Then recheck 2 weeks or so please.

## 2025-05-30 NOTE — TELEPHONE ENCOUNTER
CALLED [facility] TO Echo SKILL[facility] NURSING ORD[facility]   [facility]

## 2025-06-09 ENCOUNTER — TELEPHONE (OUTPATIENT)
Dept: FAMILY MEDICINE CLINIC | Age: 89
End: 2025-06-09

## 2025-06-19 ENCOUNTER — TELEPHONE (OUTPATIENT)
Dept: FAMILY MEDICINE CLINIC | Age: 89
End: 2025-06-19

## 2025-06-19 NOTE — TELEPHONE ENCOUNTER
Please call and tell him that INR of 2.5 is where we wanted.  The warfarin dose should stay the same.  She should be mindful of avoiding spicy or acid the type foods which might help with the discomfort she is getting in her chest that is relieved by the Tums.    Next INR in 3 weeks.

## 2025-06-24 DIAGNOSIS — I48.21 PERMANENT ATRIAL FIBRILLATION (HCC): ICD-10-CM

## 2025-06-24 RX ORDER — WARFARIN SODIUM 4 MG/1
TABLET ORAL
Qty: 90 TABLET | Refills: 1 | Status: SHIPPED | OUTPATIENT
Start: 2025-06-24

## 2025-06-24 NOTE — TELEPHONE ENCOUNTER
Future Appointments   Date Time Provider Department Center   10/10/2025  1:00 PM Viky Pride, CLAIRE - CNP [facility] 3/4/2025

## 2025-07-01 ENCOUNTER — OFFICE VISIT (OUTPATIENT)
Dept: FAMILY MEDICINE CLINIC | Age: 89
End: 2025-07-01

## 2025-07-01 VITALS
OXYGEN SATURATION: 95 % | BODY MASS INDEX: 25.31 KG/M2 | DIASTOLIC BLOOD PRESSURE: 90 MMHG | WEIGHT: 129.6 LBS | HEART RATE: 65 BPM | TEMPERATURE: 97.5 F | SYSTOLIC BLOOD PRESSURE: 178 MMHG | RESPIRATION RATE: 16 BRPM

## 2025-07-01 DIAGNOSIS — L30.4 INTERTRIGO: ICD-10-CM

## 2025-07-01 DIAGNOSIS — R30.0 DYSURIA: ICD-10-CM

## 2025-07-01 DIAGNOSIS — N30.01 ACUTE CYSTITIS WITH HEMATURIA: Primary | ICD-10-CM

## 2025-07-01 LAB
BILIRUBIN, POC: NORMAL
BLOOD URINE, POC: NORMAL
CLARITY, POC: NORMAL
COLOR, POC: NORMAL
GLUCOSE URINE, POC: NORMAL MG/DL
KETONES, POC: NORMAL MG/DL
LEUKOCYTE EST, POC: NORMAL
NITRITE, POC: NORMAL
PH, POC: 7
PROTEIN, POC: NORMAL MG/DL
SPECIFIC GRAVITY, POC: 1.02
UROBILINOGEN, POC: NORMAL MG/DL

## 2025-07-01 RX ORDER — CLOTRIMAZOLE AND BETAMETHASONE DIPROPIONATE 10; .64 MG/G; MG/G
CREAM TOPICAL
Qty: 1 EACH | Refills: 1 | Status: SHIPPED | OUTPATIENT
Start: 2025-07-01

## 2025-07-01 RX ORDER — CIPROFLOXACIN 500 MG/1
500 TABLET, FILM COATED ORAL 2 TIMES DAILY
Qty: 14 TABLET | Refills: 0 | Status: SHIPPED | OUTPATIENT
Start: 2025-07-01 | End: 2025-07-08

## 2025-07-01 ASSESSMENT — ENCOUNTER SYMPTOMS
COUGH: 0
DIARRHEA: 0
NAUSEA: 0
SHORTNESS OF BREATH: 0
VOMITING: 0

## 2025-07-01 NOTE — PROGRESS NOTES
[facility]  2025    Zeinab De León (:  1931) is a 89 y.o. female, here for evaluation of the following medical concerns:    Chief Complaint   Patient presents with    Hematuria         Urinary Pain     burning    Urinary Frequency    Rash     Xabt a wk, Under breast, spreading        ASSESSMENT/ PLAN  1. Acute cystitis with hematuria  - ciprofloxacin (CIPRO) 500 MG tablet; Take 1 tablet by mouth 2 times daily for 7 days  Dispense: 14 tablet; Refill: 0    2. Dysuria  - POCT Urinalysis No Micro (Auto)  - ciprofloxacin (CIPRO) 500 MG tablet; Take 1 tablet by mouth 2 times daily for 7 days  Dispense: 14 tablet; Refill: 0  - Culture, Urine    3. Intertrigo  - clotrimazole-betamethasone (LOTRISONE) 1-0.05 % cream; APPLY TWO TIMES A DAY for 5-7 days  Dispense: 1 each; Refill: 1        - Supportive care measures discussed.     Urine dipstick shows positive for RBC's and positive for leukocytes. Negative for nitrites.    Return if symptoms worsen or fail to improve.    HPI  Zeinab is here today with some acute urinary symptoms.  Started a few days ago, but then got worse /Monday.   +dysuria  +hematuria/pink tinged urine  +urinary frequency but not much comes out when she gets to the bathroom.  +incontinence/dribbling of urine.  +lower back pain, but that is chronic. Nothing worse than her baseline.  +lower abd/pelvic pressure/discomfort.  Denies fever/chills. Not having any worsening fatigue, etc.  Denies N/V/D. Appetite is normal.  Drinking water and [employer]. Spends a lot outside and has been increasing her fluids as a result.    No recent abx use.      ROS  Review of Systems   Constitutional:  Negative for appetite change, chills, fatigue and fever.   Respiratory:  Negative for cough and shortness of breath.    Cardiovascular:  Negative for chest pain, palpitations and leg swelling.   Gastrointestinal:  Negative for diarrhea, nausea and vomiting.   Genitourinary:  Positive for

## 2025-07-03 ENCOUNTER — RESULTS FOLLOW-UP (OUTPATIENT)
Dept: FAMILY MEDICINE CLINIC | Age: 89
End: 2025-07-03

## 2025-07-04 LAB
BACTERIA UR CULT: ABNORMAL
ORGANISM: ABNORMAL

## 2025-07-29 ENCOUNTER — TELEPHONE (OUTPATIENT)
Dept: FAMILY MEDICINE CLINIC | Age: 89
End: 2025-07-29

## 2025-07-29 NOTE — TELEPHONE ENCOUNTER
Juanito with home health is calling with updates for this patient    Her bp has been in the  140s-160s over 80s-90s, does dr godoy want to increase medicines?      Increased swelling in legs with hose on plus 1    Re-certing for skilled nursing once a week for disease management, please call back with verbal she has a secured VM if you call back just leave your name and message.       INR on 18th 2.7, will call back for INR for today.

## 2025-07-29 NOTE — TELEPHONE ENCOUNTER
Please call and tell him that I will sign for additional therapy if that is what they need.  Also please monitor caffeine and salt intake.  I would like to monitor the blood pressure for a week or so as I am slightly reluctant to increase her BP dose and have her start to have orthostatic changes etc.  Advise us of her most recent PT or  up upcoming PT.

## 2025-07-29 NOTE — TELEPHONE ENCOUNTER
Called & spoke to Juanito     She forgot to ask if Physical therapy could be added?  Will need a verbal      Also, pt's INR was 3.0 today  They are asking the medication to stay the same please

## 2025-08-06 ENCOUNTER — TELEPHONE (OUTPATIENT)
Dept: FAMILY MEDICINE CLINIC | Age: 89
End: 2025-08-06

## 2025-08-11 ENCOUNTER — TELEPHONE (OUTPATIENT)
Dept: FAMILY MEDICINE CLINIC | Age: 89
End: 2025-08-11

## 2025-08-18 ENCOUNTER — TELEPHONE (OUTPATIENT)
Dept: FAMILY MEDICINE CLINIC | Age: 89
End: 2025-08-18

## 2025-08-27 ENCOUNTER — TELEPHONE (OUTPATIENT)
Dept: FAMILY MEDICINE CLINIC | Age: 89
End: 2025-08-27

## 2025-08-29 ENCOUNTER — TELEPHONE (OUTPATIENT)
Dept: FAMILY MEDICINE CLINIC | Age: 89
End: 2025-08-29

## 2025-09-02 DIAGNOSIS — K21.9 GASTROESOPHAGEAL REFLUX DISEASE WITHOUT ESOPHAGITIS: ICD-10-CM

## 2025-09-02 DIAGNOSIS — I48.21 PERMANENT ATRIAL FIBRILLATION (HCC): ICD-10-CM

## 2025-09-02 DIAGNOSIS — E78.2 MIXED HYPERLIPIDEMIA: ICD-10-CM

## 2025-09-02 DIAGNOSIS — I10 ESSENTIAL HYPERTENSION: ICD-10-CM

## 2025-09-02 RX ORDER — DILTIAZEM HYDROCHLORIDE 240 MG/1
240 CAPSULE, COATED, EXTENDED RELEASE ORAL DAILY
Qty: 90 CAPSULE | Refills: 1 | Status: SHIPPED | OUTPATIENT
Start: 2025-09-02

## 2025-09-02 RX ORDER — ATORVASTATIN CALCIUM 20 MG/1
20 TABLET, FILM COATED ORAL DAILY
Qty: 90 TABLET | Refills: 1 | Status: SHIPPED | OUTPATIENT
Start: 2025-09-02

## 2025-09-02 RX ORDER — OMEPRAZOLE 40 MG/1
40 CAPSULE, DELAYED RELEASE ORAL DAILY
Qty: 90 CAPSULE | Refills: 1 | Status: SHIPPED | OUTPATIENT
Start: 2025-09-02